# Patient Record
Sex: FEMALE | Race: WHITE
[De-identification: names, ages, dates, MRNs, and addresses within clinical notes are randomized per-mention and may not be internally consistent; named-entity substitution may affect disease eponyms.]

---

## 2020-02-18 ENCOUNTER — APPOINTMENT (OUTPATIENT)
Dept: OBGYN | Facility: CLINIC | Age: 30
End: 2020-02-18
Payer: COMMERCIAL

## 2020-02-18 PROCEDURE — 99385 PREV VISIT NEW AGE 18-39: CPT

## 2021-07-13 ENCOUNTER — APPOINTMENT (OUTPATIENT)
Dept: OBGYN | Facility: CLINIC | Age: 31
End: 2021-07-13
Payer: COMMERCIAL

## 2021-07-13 PROCEDURE — 99395 PREV VISIT EST AGE 18-39: CPT

## 2021-07-13 PROCEDURE — 99072 ADDL SUPL MATRL&STAF TM PHE: CPT

## 2023-01-29 PROBLEM — Z00.00 ENCOUNTER FOR PREVENTIVE HEALTH EXAMINATION: Status: ACTIVE | Noted: 2023-01-29

## 2023-01-30 ENCOUNTER — APPOINTMENT (OUTPATIENT)
Dept: OBGYN | Facility: CLINIC | Age: 33
End: 2023-01-30
Payer: COMMERCIAL

## 2023-01-30 ENCOUNTER — TRANSCRIPTION ENCOUNTER (OUTPATIENT)
Age: 33
End: 2023-01-30

## 2023-01-30 VITALS
HEIGHT: 61 IN | BODY MASS INDEX: 23.98 KG/M2 | WEIGHT: 127 LBS | HEART RATE: 85 BPM | OXYGEN SATURATION: 96 % | DIASTOLIC BLOOD PRESSURE: 74 MMHG | SYSTOLIC BLOOD PRESSURE: 114 MMHG

## 2023-01-30 DIAGNOSIS — Z84.81 FAMILY HISTORY OF CARRIER OF GENETIC DISEASE: ICD-10-CM

## 2023-01-30 DIAGNOSIS — Z11.51 ENCOUNTER FOR SCREENING FOR HUMAN PAPILLOMAVIRUS (HPV): ICD-10-CM

## 2023-01-30 DIAGNOSIS — Z01.419 ENCOUNTER FOR GYNECOLOGICAL EXAMINATION (GENERAL) (ROUTINE) W/OUT ABNORMAL FINDINGS: ICD-10-CM

## 2023-01-30 DIAGNOSIS — Z30.432 ENCOUNTER FOR REMOVAL OF INTRAUTERINE CONTRACEPTIVE DEVICE: ICD-10-CM

## 2023-01-30 PROCEDURE — 99395 PREV VISIT EST AGE 18-39: CPT | Mod: 25

## 2023-01-30 PROCEDURE — 58301 REMOVE INTRAUTERINE DEVICE: CPT

## 2023-01-30 NOTE — END OF VISIT
[FreeTextEntry3] : I, Cassandra Matthews, acted as a scribe on behalf of Dr. Patricia Mcdonald on 01/30/2023 .\par \par All medical entries made by the scribe were at my, Dr. Patricia Mcdonald, direction and personally dictated by me on 01/30/2023. I have reviewed the chart and agree that the record accurately reflects my personal performance of the history, physical exam, assessment and plan. I have also personally directed, reviewed, and agreed with the chart.

## 2023-01-30 NOTE — PROCEDURE
[Cervical Pap Smear] : cervical Pap smear [Liquid Base] : liquid base [Tolerated Well] : the patient tolerated the procedure well [IUD Removal] : intrauterine device (IUD) removal [IUD Removed - Forceps] : IUD removed - forceps [IUD Discarded] : IUD discarded [Fertility Desired] : fertility desired [Risks] : risks [Benefits] : benefits [Alternatives] : alternatives [Patient] : patient [Speculum Placed] : speculum placed [No Complications] : no complications

## 2023-01-30 NOTE — PLAN
[FreeTextEntry1] : 33 y/o for annual exam\par \par - pap and hpv done today\par - advised prenatal vitamins \par - discussed ovulation tracking \par - offered BRCA testing, pt will consider and will try to encourage her mother to get testing

## 2023-01-30 NOTE — HISTORY OF PRESENT ILLNESS
[FreeTextEntry1] : 31 y/o G0 for annual exam.. Last seen 7/21. Negative PAP 2/20. Pt's maternal uncle is a BRCA carrier. Pt's mother never tested for BRCA gene. Pt would like to remove IUD and would like to conceive. Pt is doing well and has no complaints. \par \par She works in finance

## 2023-01-31 LAB — HPV HIGH+LOW RISK DNA PNL CVX: NOT DETECTED

## 2023-02-02 ENCOUNTER — TRANSCRIPTION ENCOUNTER (OUTPATIENT)
Age: 33
End: 2023-02-02

## 2023-02-02 LAB — CYTOLOGY CVX/VAG DOC THIN PREP: NORMAL

## 2023-06-26 ENCOUNTER — NON-APPOINTMENT (OUTPATIENT)
Age: 33
End: 2023-06-26

## 2023-06-26 ENCOUNTER — APPOINTMENT (OUTPATIENT)
Dept: OBGYN | Facility: CLINIC | Age: 33
End: 2023-06-26
Payer: COMMERCIAL

## 2023-06-26 VITALS
HEIGHT: 61 IN | BODY MASS INDEX: 24.55 KG/M2 | SYSTOLIC BLOOD PRESSURE: 122 MMHG | HEART RATE: 115 BPM | WEIGHT: 130 LBS | OXYGEN SATURATION: 100 % | DIASTOLIC BLOOD PRESSURE: 85 MMHG

## 2023-06-26 DIAGNOSIS — N39.0 URINARY TRACT INFECTION, SITE NOT SPECIFIED: ICD-10-CM

## 2023-06-26 DIAGNOSIS — E55.9 VITAMIN D DEFICIENCY, UNSPECIFIED: ICD-10-CM

## 2023-06-26 DIAGNOSIS — R73.9 HYPERGLYCEMIA, UNSPECIFIED: ICD-10-CM

## 2023-06-26 DIAGNOSIS — Z01.83 ENCOUNTER FOR BLOOD TYPING: ICD-10-CM

## 2023-06-26 DIAGNOSIS — Z97.5 PRESENCE OF (INTRAUTERINE) CONTRACEPTIVE DEVICE: ICD-10-CM

## 2023-06-26 DIAGNOSIS — Z11.59 ENCOUNTER FOR SCREENING FOR OTHER VIRAL DISEASES: ICD-10-CM

## 2023-06-26 DIAGNOSIS — Z01.84 ENCOUNTER FOR ANTIBODY RESPONSE EXAMINATION: ICD-10-CM

## 2023-06-26 DIAGNOSIS — Z11.4 ENCOUNTER FOR SCREENING FOR HUMAN IMMUNODEFICIENCY VIRUS [HIV]: ICD-10-CM

## 2023-06-26 DIAGNOSIS — D64.9 ANEMIA, UNSPECIFIED: ICD-10-CM

## 2023-06-26 DIAGNOSIS — Z32.01 ENCOUNTER FOR PREGNANCY TEST, RESULT POSITIVE: ICD-10-CM

## 2023-06-26 DIAGNOSIS — Z11.8 ENCOUNTER FOR SCREENING FOR OTHER INFECTIOUS AND PARASITIC DISEASES: ICD-10-CM

## 2023-06-26 DIAGNOSIS — Z13.88 ENCOUNTER FOR SCREENING FOR DISORDER DUE TO EXPOSURE TO CONTAMINANTS: ICD-10-CM

## 2023-06-26 DIAGNOSIS — R53.83 OTHER FATIGUE: ICD-10-CM

## 2023-06-26 DIAGNOSIS — Z11.3 ENCOUNTER FOR SCREENING FOR INFECTIONS WITH A PREDOMINANTLY SEXUAL MODE OF TRANSMISSION: ICD-10-CM

## 2023-06-26 DIAGNOSIS — N64.4 MASTODYNIA: ICD-10-CM

## 2023-06-26 DIAGNOSIS — Z13.21 ENCOUNTER FOR SCREENING FOR NUTRITIONAL DISORDER: ICD-10-CM

## 2023-06-26 PROCEDURE — 76817 TRANSVAGINAL US OBSTETRIC: CPT

## 2023-06-26 PROCEDURE — 36415 COLL VENOUS BLD VENIPUNCTURE: CPT

## 2023-06-26 PROCEDURE — 99213 OFFICE O/P EST LOW 20 MIN: CPT | Mod: 25

## 2023-06-26 NOTE — HISTORY OF PRESENT ILLNESS
[FreeTextEntry1] : 33 y/o  presenting for confirmation of pregnancy after positive home pregnancy test. LMP May 4th. Pt had her IUD removed in 2023 and had a neg. Pap and HPV at that visit. Pt reports 24 day cycles. she complains of breast tenderness and fatigue.

## 2023-06-26 NOTE — PROCEDURE
[Transvaginal OB Sonogram] : Transvaginal OB Sonogram [Intrauterine Pregnancy] : intrauterine pregnancy [Yolk Sac] : yolk sac present [Fetal Heart] : fetal heart present [Transvaginal OB Sonogram WNL] : Transvaginal OB Sonogram WNL [Cervical Pap Smear] : cervical Pap smear [Liquid Base] : liquid base [Tolerated Well] : the patient tolerated the procedure well [No Complications] : there were no complications [CRL: ___ (mm)] : CRL - [unfilled]Umm [Current GA by Sonogram: ___ (wks)] : Current GA by Sonogram: [unfilled]Uwks [___ day(s)] : [unfilled] days

## 2023-06-26 NOTE — PLAN
[FreeTextEntry1] : 31 y/o  @ 7+4/7 weeks by LMP, 7 weeks by CRL\par \par -new OB panel\par -Pap UTD\par -F/UP 4-5 Weeks for NT sono and NIPS

## 2023-06-27 LAB
25(OH)D3 SERPL-MCNC: 27.8 NG/ML
C TRACH RRNA SPEC QL NAA+PROBE: NOT DETECTED
ESTIMATED AVERAGE GLUCOSE: 103 MG/DL
HBA1C MFR BLD HPLC: 5.2 %
HBV SURFACE AG SER QL: NONREACTIVE
HGB A MFR BLD: 97.3 %
HGB A2 MFR BLD: 2.7 %
HGB FRACT BLD-IMP: NORMAL
HIV1+2 AB SPEC QL IA.RAPID: NONREACTIVE
N GONORRHOEA RRNA SPEC QL NAA+PROBE: NOT DETECTED
SOURCE AMPLIFICATION: NORMAL
T PALLIDUM AB SER QL IA: NEGATIVE
TSH SERPL-ACNC: 4.59 UIU/ML

## 2023-06-28 ENCOUNTER — TRANSCRIPTION ENCOUNTER (OUTPATIENT)
Age: 33
End: 2023-06-28

## 2023-06-28 ENCOUNTER — LABORATORY RESULT (OUTPATIENT)
Age: 33
End: 2023-06-28

## 2023-06-28 DIAGNOSIS — Z13.29 ENCOUNTER FOR SCREENING FOR OTHER SUSPECTED ENDOCRINE DISORDER: ICD-10-CM

## 2023-06-28 LAB
ABO + RH PNL BLD: NORMAL
BACTERIA UR CULT: NORMAL
BLD GP AB SCN SERPL QL: NORMAL
LEAD BLD-MCNC: 1.2 UG/DL
MEV IGG FLD QL IA: 28.9 AU/ML
MEV IGG+IGM SER-IMP: POSITIVE
MUV AB SER-ACNC: POSITIVE
MUV IGG SER QL IA: 56 AU/ML
RUBV IGG FLD-ACNC: 3.8 INDEX
RUBV IGG SER-IMP: POSITIVE
VZV AB TITR SER: POSITIVE
VZV IGG SER IF-ACNC: 272.3 INDEX

## 2023-07-01 LAB
B19V IGG SER QL IA: 3.55 INDEX
B19V IGG+IGM SER-IMP: NORMAL
B19V IGG+IGM SER-IMP: POSITIVE
B19V IGM FLD-ACNC: 0.17 INDEX
B19V IGM SER-ACNC: NEGATIVE
TSH SERPL-ACNC: 4.23 UIU/ML

## 2023-07-03 ENCOUNTER — NON-APPOINTMENT (OUTPATIENT)
Age: 33
End: 2023-07-03

## 2023-07-03 DIAGNOSIS — R79.89 OTHER SPECIFIED ABNORMAL FINDINGS OF BLOOD CHEMISTRY: ICD-10-CM

## 2023-07-10 ENCOUNTER — NON-APPOINTMENT (OUTPATIENT)
Age: 33
End: 2023-07-10

## 2023-07-10 ENCOUNTER — TRANSCRIPTION ENCOUNTER (OUTPATIENT)
Age: 33
End: 2023-07-10

## 2023-07-11 ENCOUNTER — TRANSCRIPTION ENCOUNTER (OUTPATIENT)
Age: 33
End: 2023-07-11

## 2023-07-28 ENCOUNTER — APPOINTMENT (OUTPATIENT)
Dept: OBGYN | Facility: CLINIC | Age: 33
End: 2023-07-28
Payer: COMMERCIAL

## 2023-07-28 ENCOUNTER — ASOB RESULT (OUTPATIENT)
Age: 33
End: 2023-07-28

## 2023-07-28 DIAGNOSIS — Z34.01 ENCOUNTER FOR SUPERVISION OF NORMAL FIRST PREGNANCY, FIRST TRIMESTER: ICD-10-CM

## 2023-07-28 PROCEDURE — 0500F INITIAL PRENATAL CARE VISIT: CPT

## 2023-07-28 PROCEDURE — 36415 COLL VENOUS BLD VENIPUNCTURE: CPT

## 2023-07-28 PROCEDURE — 76801 OB US < 14 WKS SINGLE FETUS: CPT

## 2023-07-28 RX ORDER — LEVOTHYROXINE SODIUM 0.05 MG/1
50 TABLET ORAL DAILY
Qty: 90 | Refills: 1 | Status: ACTIVE | COMMUNITY
Start: 2023-07-03 | End: 1900-01-01

## 2023-07-31 ENCOUNTER — TRANSCRIPTION ENCOUNTER (OUTPATIENT)
Age: 33
End: 2023-07-31

## 2023-07-31 ENCOUNTER — NON-APPOINTMENT (OUTPATIENT)
Age: 33
End: 2023-07-31

## 2023-07-31 LAB — TSH SERPL-ACNC: 2.33 UIU/ML

## 2023-08-10 ENCOUNTER — APPOINTMENT (OUTPATIENT)
Dept: ANTEPARTUM | Facility: CLINIC | Age: 33
End: 2023-08-10
Payer: COMMERCIAL

## 2023-08-10 ENCOUNTER — ASOB RESULT (OUTPATIENT)
Age: 33
End: 2023-08-10

## 2023-08-10 ENCOUNTER — NON-APPOINTMENT (OUTPATIENT)
Age: 33
End: 2023-08-10

## 2023-08-10 PROCEDURE — 36415 COLL VENOUS BLD VENIPUNCTURE: CPT

## 2023-08-10 PROCEDURE — 76813 OB US NUCHAL MEAS 1 GEST: CPT

## 2023-08-10 PROCEDURE — 93976 VASCULAR STUDY: CPT

## 2023-08-16 ENCOUNTER — TRANSCRIPTION ENCOUNTER (OUTPATIENT)
Age: 33
End: 2023-08-16

## 2023-08-23 ENCOUNTER — NON-APPOINTMENT (OUTPATIENT)
Age: 33
End: 2023-08-23

## 2023-08-23 ENCOUNTER — TRANSCRIPTION ENCOUNTER (OUTPATIENT)
Age: 33
End: 2023-08-23

## 2023-08-23 ENCOUNTER — APPOINTMENT (OUTPATIENT)
Dept: OBGYN | Facility: CLINIC | Age: 33
End: 2023-08-23

## 2023-08-24 ENCOUNTER — TRANSCRIPTION ENCOUNTER (OUTPATIENT)
Age: 33
End: 2023-08-24

## 2023-08-25 ENCOUNTER — TRANSCRIPTION ENCOUNTER (OUTPATIENT)
Age: 33
End: 2023-08-25

## 2023-08-28 ENCOUNTER — NON-APPOINTMENT (OUTPATIENT)
Age: 33
End: 2023-08-28

## 2023-08-28 ENCOUNTER — APPOINTMENT (OUTPATIENT)
Dept: OBGYN | Facility: CLINIC | Age: 33
End: 2023-08-28
Payer: COMMERCIAL

## 2023-08-28 PROCEDURE — 0502F SUBSEQUENT PRENATAL CARE: CPT

## 2023-08-29 ENCOUNTER — NON-APPOINTMENT (OUTPATIENT)
Age: 33
End: 2023-08-29

## 2023-08-29 ENCOUNTER — ASOB RESULT (OUTPATIENT)
Age: 33
End: 2023-08-29

## 2023-08-30 ENCOUNTER — INPATIENT (INPATIENT)
Facility: HOSPITAL | Age: 33
LOS: 0 days | Discharge: HOME | DRG: 833 | End: 2023-08-31
Attending: STUDENT IN AN ORGANIZED HEALTH CARE EDUCATION/TRAINING PROGRAM | Admitting: STUDENT IN AN ORGANIZED HEALTH CARE EDUCATION/TRAINING PROGRAM
Payer: COMMERCIAL

## 2023-08-30 ENCOUNTER — LABORATORY RESULT (OUTPATIENT)
Age: 33
End: 2023-08-30

## 2023-08-30 ENCOUNTER — APPOINTMENT (OUTPATIENT)
Dept: MATERNAL FETAL MEDICINE | Facility: CLINIC | Age: 33
End: 2023-08-30
Payer: COMMERCIAL

## 2023-08-30 ENCOUNTER — APPOINTMENT (OUTPATIENT)
Dept: ANTEPARTUM | Facility: CLINIC | Age: 33
End: 2023-08-30
Payer: COMMERCIAL

## 2023-08-30 ENCOUNTER — ASOB RESULT (OUTPATIENT)
Age: 33
End: 2023-08-30

## 2023-08-30 VITALS — SYSTOLIC BLOOD PRESSURE: 121 MMHG | HEART RATE: 81 BPM | DIASTOLIC BLOOD PRESSURE: 92 MMHG | OXYGEN SATURATION: 99 %

## 2023-08-30 DIAGNOSIS — O26.899 OTHER SPECIFIED PREGNANCY RELATED CONDITIONS, UNSPECIFIED TRIMESTER: ICD-10-CM

## 2023-08-30 PROCEDURE — 76805 OB US >/= 14 WKS SNGL FETUS: CPT

## 2023-08-30 PROCEDURE — 59000 AMNIOCENTESIS DIAGNOSTIC: CPT

## 2023-08-30 PROCEDURE — 99203 OFFICE O/P NEW LOW 30 MIN: CPT | Mod: 1L,95

## 2023-08-30 PROCEDURE — 76946 ECHO GUIDE FOR AMNIOCENTESIS: CPT

## 2023-08-30 NOTE — OB PROVIDER TRIAGE NOTE - NSOBPROVIDERNOTE_OBGYN_ALL_OB_FT
32 year old  @ 15+6 presents for r/o PPROM complaining of an acute leakage of fluid at 7:15p.    -nitrazine negative, ferning negative, pooling negative      to be discussed w Dr Silas Bob PGY3 32 year old  @ 15+6 presents for r/o PPROM complaining of an acute leakage of fluid at 7:15p s/p amniocentesis.    -quiescent on tocometer  -nitrazine negative, ferning negative, pooling negative  -given low fluid seen on ultrasound, amnisure to be sent    to be discussed w Dr Silas Bob PGY3 32 year old  @ 15+6 presents for r/o PPROM complaining of an acute leakage of fluid at 7:15p s/p amniocentesis.    -quiescent on tocometer  -nitrazine negative, ferning negative, pooling negative  -given low fluid seen on ultrasound, amnisure to be sent    discussed w Dr Silas Bob PGY3    MD called to reassess patient 2/2 increased lekage of fluid  -speculum: pooling positive  -amnisure positive    discussed w Dr Silas Bob PGY3

## 2023-08-30 NOTE — OB PROVIDER TRIAGE NOTE - HISTORY OF PRESENT ILLNESS
Pt is a 33y/o  at 15+6 currently POD#0 s/p amniocentesis presents to triage for r/o PPROM. Patient states that at 6:15p this evening patient felt warm fluid in her underwear that soaked her underwear but not her pants. Subsequently decided to come to r/o rupture. Denies Cx, VB.  Prenatal course c/b...  1. low PAPPA with low risk NIPs patient s/p Amnio (40cc) on     ATU (): variable, posterior placenta, 123g    OBHx: Primagravid  GynHx: Denies cysts, fibroids, abnormal paps, STIs  PMHx: Herniated discs  PSHx: Open appendectomy, Back Surgery x2  Med: ASA, PNV, Levothyroxine  All: NKDA  SH: denies toxic habits x3     Pt is a 31y/o  at 15+6 currently POD#0 s/p amniocentesis presents to triage for r/o PPROM. Patient states that at 6:15p this evening patient felt warm fluid in her underwear that soaked her underwear but not her pants. Subsequently decided to come to r/o rupture. Denies Cx, VB.  Prenatal course c/b...  1. low PAPPA with low risk NIPs patient s/p Amnio (40cc) on     ATU (): variable, posterior placenta, 123g    OBHx: Primagravid  GynHx: Denies cysts, fibroids, abnormal paps, STIs  PMHx: Herniated discs  PSHx: Open appendectomy, Back Surgery x2  Med: ASA, PNV, Levothyroxine  All: NKDA  SH: denies toxic habits x3

## 2023-08-30 NOTE — OB PROVIDER TRIAGE NOTE - NSHPPHYSICALEXAM_GEN_ALL_CORE
VS:  Vital Signs Last 24 Hrs  T(C): 37.2 (30 Aug 2023 21:35), Max: 37.2 (30 Aug 2023 21:35)  T(F): 98.96 (30 Aug 2023 21:35), Max: 98.96 (30 Aug 2023 21:35)  HR: 74 (30 Aug 2023 22:38) (73 - 113)  BP: 121/82 (30 Aug 2023 21:28) (108/68 - 121/92)  BP(mean): --  RR: 18 (30 Aug 2023 21:28) (18 - 19)  SpO2: 94% (30 Aug 2023 22:38) (87% - 100%)    Parameters below as of 30 Aug 2023 21:28  Patient On (Oxygen Delivery Method): room air      GA: NAD  Cards: RRR  Pulm: CTAB  FHR: 166bp  [place]: not angela  VE: deferred  TAUS: variable, +FM  Speculum: Negative nitrazine, negative ferning, negative pooling VS:  Vital Signs Last 24 Hrs  T(C): 37.2 (30 Aug 2023 21:35), Max: 37.2 (30 Aug 2023 21:35)  T(F): 98.96 (30 Aug 2023 21:35), Max: 98.96 (30 Aug 2023 21:35)  HR: 74 (30 Aug 2023 22:38) (73 - 113)  BP: 121/82 (30 Aug 2023 21:28) (108/68 - 121/92)  BP(mean): --  RR: 18 (30 Aug 2023 21:28) (18 - 19)  SpO2: 94% (30 Aug 2023 22:38) (87% - 100%)    Parameters below as of 30 Aug 2023 21:28  Patient On (Oxygen Delivery Method): room air      GA: NAD  Cards: RRR  Pulm: CTAB  FHR: 166bp  [place]: not angela  VE: deferred  TAUS: variable, +FM  Speculum: Negative nitrazine, negative ferning, negative pooling VS:  Vital Signs Last 24 Hrs  T(C): 37.2 (30 Aug 2023 21:35), Max: 37.2 (30 Aug 2023 21:35)  T(F): 98.96 (30 Aug 2023 21:35), Max: 98.96 (30 Aug 2023 21:35)  HR: 74 (30 Aug 2023 22:38) (73 - 113)  BP: 121/82 (30 Aug 2023 21:28) (108/68 - 121/92)  BP(mean): --  RR: 18 (30 Aug 2023 21:28) (18 - 19)  SpO2: 94% (30 Aug 2023 22:38) (87% - 100%)    Parameters below as of 30 Aug 2023 21:28  Patient On (Oxygen Delivery Method): room air      GA: NAD  Cards: RRR  Pulm: CTAB  FHR: 166bp  [place]: not angela  VE: deferred  TAUS: variable, +FM  Speculum: Negative nitrazine, negative ferning, negative pooling

## 2023-08-30 NOTE — OB RN TRIAGE NOTE - FALL HARM RISK - UNIVERSAL INTERVENTIONS
Bed in lowest position, wheels locked, appropriate side rails in place/Call bell, personal items and telephone in reach/Instruct patient to call for assistance before getting out of bed or chair/Non-slip footwear when patient is out of bed/[place] to call system/Physically safe environment - no spills, clutter or unnecessary equipment/Purposeful Proactive Rounding/Room/bathroom lighting operational, light cord in reach Bed in lowest position, wheels locked, appropriate side rails in place/Call bell, personal items and telephone in reach/Instruct patient to call for assistance before getting out of bed or chair/Non-slip footwear when patient is out of bed/[place] to call system/Physically safe environment - no spills, clutter or unnecessary equipment/Purposeful Proactive Rounding/Room/bathroom lighting operational, light cord in reach Bed in lowest position, wheels locked, appropriate side rails in place/Call bell, personal items and telephone in reach/Instruct patient to call for assistance before getting out of bed or chair/Non-slip footwear when patient is out of bed/[place] to call system/Physically safe environment - no spills, clutter or unnecessary equipment/Purposeful Proactive Rounding/Room/bathroom lighting operational, light cord in reach

## 2023-08-31 ENCOUNTER — TRANSCRIPTION ENCOUNTER (OUTPATIENT)
Age: 33
End: 2023-08-31

## 2023-08-31 ENCOUNTER — APPOINTMENT (OUTPATIENT)
Dept: ANTEPARTUM | Facility: CLINIC | Age: 33
End: 2023-08-31
Payer: COMMERCIAL

## 2023-08-31 ENCOUNTER — NON-APPOINTMENT (OUTPATIENT)
Age: 33
End: 2023-08-31

## 2023-08-31 ENCOUNTER — ASOB RESULT (OUTPATIENT)
Age: 33
End: 2023-08-31

## 2023-08-31 VITALS
RESPIRATION RATE: 18 BRPM | TEMPERATURE: 99 F | SYSTOLIC BLOOD PRESSURE: 99 MMHG | DIASTOLIC BLOOD PRESSURE: 65 MMHG | OXYGEN SATURATION: 97 % | HEART RATE: 72 BPM

## 2023-08-31 DIAGNOSIS — Z34.80 ENCOUNTER FOR SUPERVISION OF OTHER NORMAL PREGNANCY, UNSPECIFIED TRIMESTER: ICD-10-CM

## 2023-08-31 LAB
ALBUMIN SERPL ELPH-MCNC: 4.1 G/DL — SIGNIFICANT CHANGE UP (ref 3.3–5)
ALP SERPL-CCNC: 92 U/L — SIGNIFICANT CHANGE UP (ref 40–120)
ALT FLD-CCNC: 95 U/L — HIGH (ref 10–45)
AMNISURE ROM (RUPTURE OF MEMBRANES): POSITIVE
ANION GAP SERPL CALC-SCNC: 17 MMOL/L — SIGNIFICANT CHANGE UP (ref 5–17)
APTT BLD: 26.3 SEC — SIGNIFICANT CHANGE UP (ref 24.5–35.6)
AST SERPL-CCNC: 47 U/L — HIGH (ref 10–40)
BILIRUB SERPL-MCNC: 0.2 MG/DL — SIGNIFICANT CHANGE UP (ref 0.2–1.2)
BLD GP AB SCN SERPL QL: NEGATIVE — SIGNIFICANT CHANGE UP
BUN SERPL-MCNC: 6 MG/DL — LOW (ref 7–23)
CALCIUM SERPL-MCNC: 9.3 MG/DL — SIGNIFICANT CHANGE UP (ref 8.4–10.5)
CHLORIDE SERPL-SCNC: 101 MMOL/L — SIGNIFICANT CHANGE UP (ref 96–108)
CO2 SERPL-SCNC: 18 MMOL/L — LOW (ref 22–31)
CREAT SERPL-MCNC: 0.55 MG/DL — SIGNIFICANT CHANGE UP (ref 0.5–1.3)
EGFR: 126 ML/MIN/1.73M2 — SIGNIFICANT CHANGE UP
FIBRINOGEN PPP-MCNC: 557 MG/DL — HIGH (ref 200–445)
GLUCOSE SERPL-MCNC: 90 MG/DL — SIGNIFICANT CHANGE UP (ref 70–99)
HCT VFR BLD CALC: 34.8 % — SIGNIFICANT CHANGE UP (ref 34.5–45)
HCT VFR BLD CALC: 36.9 % — SIGNIFICANT CHANGE UP (ref 34.5–45)
HGB BLD-MCNC: 12.2 G/DL — SIGNIFICANT CHANGE UP (ref 11.5–15.5)
HGB BLD-MCNC: 12.7 G/DL — SIGNIFICANT CHANGE UP (ref 11.5–15.5)
INR BLD: 0.93 RATIO — SIGNIFICANT CHANGE UP (ref 0.85–1.18)
MCHC RBC-ENTMCNC: 31.1 PG — SIGNIFICANT CHANGE UP (ref 27–34)
MCHC RBC-ENTMCNC: 31.5 PG — SIGNIFICANT CHANGE UP (ref 27–34)
MCHC RBC-ENTMCNC: 34.4 GM/DL — SIGNIFICANT CHANGE UP (ref 32–36)
MCHC RBC-ENTMCNC: 35.1 GM/DL — SIGNIFICANT CHANGE UP (ref 32–36)
MCV RBC AUTO: 89.9 FL — SIGNIFICANT CHANGE UP (ref 80–100)
MCV RBC AUTO: 90.2 FL — SIGNIFICANT CHANGE UP (ref 80–100)
NRBC # BLD: 0 /100 WBCS — SIGNIFICANT CHANGE UP (ref 0–0)
PLATELET # BLD AUTO: 227 K/UL — SIGNIFICANT CHANGE UP (ref 150–400)
PLATELET # BLD AUTO: 235 K/UL — SIGNIFICANT CHANGE UP (ref 150–400)
POTASSIUM SERPL-MCNC: 4.2 MMOL/L — SIGNIFICANT CHANGE UP (ref 3.5–5.3)
POTASSIUM SERPL-SCNC: 4.2 MMOL/L — SIGNIFICANT CHANGE UP (ref 3.5–5.3)
PROT SERPL-MCNC: 7.1 G/DL — SIGNIFICANT CHANGE UP (ref 6–8.3)
PROTHROM AB SERPL-ACNC: 9.8 SEC — SIGNIFICANT CHANGE UP (ref 9.5–13)
RBC # BLD: 3.87 M/UL — SIGNIFICANT CHANGE UP (ref 3.8–5.2)
RBC # BLD: 4.09 M/UL — SIGNIFICANT CHANGE UP (ref 3.8–5.2)
RBC # FLD: 11.4 % — SIGNIFICANT CHANGE UP (ref 10.3–14.5)
RBC # FLD: 11.5 % — SIGNIFICANT CHANGE UP (ref 10.3–14.5)
RH IG SCN BLD-IMP: POSITIVE — SIGNIFICANT CHANGE UP
SODIUM SERPL-SCNC: 136 MMOL/L — SIGNIFICANT CHANGE UP (ref 135–145)
WBC # BLD: 8.51 K/UL — SIGNIFICANT CHANGE UP (ref 3.8–10.5)
WBC # BLD: 9.34 K/UL — SIGNIFICANT CHANGE UP (ref 3.8–10.5)
WBC # FLD AUTO: 8.51 K/UL — SIGNIFICANT CHANGE UP (ref 3.8–10.5)
WBC # FLD AUTO: 9.34 K/UL — SIGNIFICANT CHANGE UP (ref 3.8–10.5)

## 2023-08-31 PROCEDURE — 76815 OB US LIMITED FETUS(S): CPT | Mod: 26

## 2023-08-31 PROCEDURE — 86900 BLOOD TYPING SEROLOGIC ABO: CPT

## 2023-08-31 PROCEDURE — 84112 EVAL AMNIOTIC FLUID PROTEIN: CPT

## 2023-08-31 PROCEDURE — 36415 COLL VENOUS BLD VENIPUNCTURE: CPT

## 2023-08-31 PROCEDURE — 85730 THROMBOPLASTIN TIME PARTIAL: CPT

## 2023-08-31 PROCEDURE — 99238 HOSP IP/OBS DSCHRG MGMT 30/<: CPT

## 2023-08-31 PROCEDURE — 85384 FIBRINOGEN ACTIVITY: CPT

## 2023-08-31 PROCEDURE — 86901 BLOOD TYPING SEROLOGIC RH(D): CPT

## 2023-08-31 PROCEDURE — 99222 1ST HOSP IP/OBS MODERATE 55: CPT | Mod: 25

## 2023-08-31 PROCEDURE — 85027 COMPLETE CBC AUTOMATED: CPT

## 2023-08-31 PROCEDURE — 86850 RBC ANTIBODY SCREEN: CPT

## 2023-08-31 PROCEDURE — 85610 PROTHROMBIN TIME: CPT

## 2023-08-31 PROCEDURE — 80053 COMPREHEN METABOLIC PANEL: CPT

## 2023-08-31 RX ORDER — ACETAMINOPHEN 500 MG
975 TABLET ORAL ONCE
Refills: 0 | Status: COMPLETED | OUTPATIENT
Start: 2023-08-31 | End: 2023-08-31

## 2023-08-31 RX ORDER — LEVOTHYROXINE SODIUM 125 MCG
50 TABLET ORAL DAILY
Refills: 0 | Status: DISCONTINUED | OUTPATIENT
Start: 2023-08-31 | End: 2023-08-31

## 2023-08-31 RX ORDER — SODIUM CHLORIDE 9 MG/ML
1000 INJECTION, SOLUTION INTRAVENOUS
Refills: 0 | Status: DISCONTINUED | OUTPATIENT
Start: 2023-08-31 | End: 2023-08-31

## 2023-08-31 RX ORDER — LEVOTHYROXINE SODIUM 125 MCG
1 TABLET ORAL
Qty: 0 | Refills: 0 | DISCHARGE
Start: 2023-08-31

## 2023-08-31 RX ADMIN — SODIUM CHLORIDE 125 MILLILITER(S): 9 INJECTION, SOLUTION INTRAVENOUS at 09:21

## 2023-08-31 RX ADMIN — Medication 975 MILLIGRAM(S): at 09:31

## 2023-08-31 RX ADMIN — Medication 975 MILLIGRAM(S): at 10:31

## 2023-08-31 RX ADMIN — Medication 50 MICROGRAM(S): at 06:02

## 2023-08-31 NOTE — CONSULT NOTE ADULT - SUBJECTIVE AND OBJECTIVE BOX
MFM Consult Note  SHANTANU WU  32y  Female [id number]    HPI:  Pt is a 31y/o  now at 16+0, POD#1 s/p amniocentesis who was admitted for previable PPROM (+pooling, + Amnisure) Patient reports leakage of fluid beginning at 615pm following amniocentesis, she felt warm fluid in her underwear that soaked her underwear but not her pants. She denies having contractions, abdominal pain, or vaginal bleeding. Also denies fevers, chills, nausea, vomiting.       Name of OB Physician: Dr. Patricia Mcdonald    Prenatal course c/b...  1. low PAPPA with low risk NIPs patient s/p Amnio (40cc) on  due to concerns for trisomy 21    ATU (): variable, posterior placenta, 123g    OBHx: Primigravid  GynHx: Denies cysts, fibroids, abnormal paps, STIs  PMHx: Herniated discs  PSHx: Open appendectomy, Back Surgery x2  Med: ASA, PNV, Levothyroxine 50mcg  All: NKDA  SH: denies x3          Physical Exam:   ICU Vital Signs Last 24 Hrs  T(C): 36.7 (31 Aug 2023 06:05), Max: 37.2 (30 Aug 2023 21:35)  T(F): 98 (31 Aug 2023 06:05), Max: 99 (31 Aug 2023 01:44)  HR: 93 (31 Aug 2023 06:05) (71 - 128)  BP: 116/71 (31 Aug 2023 06:05) (108/68 - 121/92)  BP(mean): --  ABP: --  ABP(mean): --  RR: 18 (31 Aug 2023 06:05) (17 - 19)  SpO2: 97% (31 Aug 2023 06:05) (74% - 100%)    O2 Parameters below as of 31 Aug 2023 06:05  Patient On (Oxygen Delivery Method): room air          General: sitting comfortably in bed, NAD, tearful  SVE: deferred     LABS:                              12.2   8.51  )-----------( 227      ( 31 Aug 2023 06:36 )             34.8     08-    136  |  101  |  6<L>  ----------------------------<  90  4.2   |  18<L>  |  0.55    Ca    9.3      31 Aug 2023 01:56    TPro  7.1  /  Alb  4.1  /  TBili  0.2  /  DBili  x   /  AST  47<H>  /  ALT  95<H>  /  AlkPhos  92      I&O's Detail    30 Aug 2023 07:01  -  31 Aug 2023 07:00  --------------------------------------------------------  IN:    Lactated Ringers: 375 mL  Total IN: 375 mL    OUT:  Total OUT: 0 mL    Total NET: 375 mL        PT/INR - ( 31 Aug 2023 06:36 )   PT: 9.8 sec;   INR: 0.93 ratio         PTT - ( 31 Aug 2023 06:36 )  PTT:26.3 sec  Urinalysis Basic - ( 31 Aug 2023 01:56 )    Color: x / Appearance: x / SG: x / pH: x  Gluc: 90 mg/dL / Ketone: x  / Bili: x / Urobili: x   Blood: x / Protein: x / Nitrite: x   Leuk Esterase: x / RBC: x / WBC x   Sq Epi: x / Non Sq Epi: x / Bacteria: x     MFM Consult Note  SHANTANU WU  32y  Female [id number]    HPI:  Pt is a 31y/o  now at 16+0, POD#1 s/p amniocentesis who was admitted for previable PPROM (+pooling, + Amnisure) Patient reports leakage of fluid beginning at 615pm following amniocentesis, she felt warm fluid in her underwear that soaked her underwear but not her pants. She denies having contractions, abdominal pain, or vaginal bleeding. Also denies fevers, chills, nausea, vomiting.       Name of OB Physician: Dr. Patricia Mcdonald    Prenatal course c/b...  1. low PAPPA with low risk NIPs patient s/p Amnio (40cc) on  due to concerns for trisomy 21    ATU (): variable, posterior placenta, 123g    OBHx: Primigravid  GynHx: Denies cysts, fibroids, abnormal paps, STIs  PMHx: Herniated discs  PSHx: Open appendectomy, Back Surgery x2  Med: ASA, PNV, Levothyroxine 50mcg  All: NKDA  SH: denies x3          Physical Exam:   ICU Vital Signs Last 24 Hrs  T(C): 36.7 (31 Aug 2023 06:05), Max: 37.2 (30 Aug 2023 21:35)  T(F): 98 (31 Aug 2023 06:05), Max: 99 (31 Aug 2023 01:44)  HR: 93 (31 Aug 2023 06:05) (71 - 128)  BP: 116/71 (31 Aug 2023 06:05) (108/68 - 121/92)  BP(mean): --  ABP: --  ABP(mean): --  RR: 18 (31 Aug 2023 06:05) (17 - 19)  SpO2: 97% (31 Aug 2023 06:05) (74% - 100%)    O2 Parameters below as of 31 Aug 2023 06:05  Patient On (Oxygen Delivery Method): room air          General: sitting comfortably in bed, NAD, tearful  SVE: deferred     LABS:                              12.2   8.51  )-----------( 227      ( 31 Aug 2023 06:36 )             34.8     08-    136  |  101  |  6<L>  ----------------------------<  90  4.2   |  18<L>  |  0.55    Ca    9.3      31 Aug 2023 01:56    TPro  7.1  /  Alb  4.1  /  TBili  0.2  /  DBili  x   /  AST  47<H>  /  ALT  95<H>  /  AlkPhos  92      I&O's Detail    30 Aug 2023 07:01  -  31 Aug 2023 07:00  --------------------------------------------------------  IN:    Lactated Ringers: 375 mL  Total IN: 375 mL    OUT:  Total OUT: 0 mL    Total NET: 375 mL        PT/INR - ( 31 Aug 2023 06:36 )   PT: 9.8 sec;   INR: 0.93 ratio         PTT - ( 31 Aug 2023 06:36 )  PTT:26.3 sec  Urinalysis Basic - ( 31 Aug 2023 01:56 )    Color: x / Appearance: x / SG: x / pH: x  Gluc: 90 mg/dL / Ketone: x  / Bili: x / Urobili: x   Blood: x / Protein: x / Nitrite: x   Leuk Esterase: x / RBC: x / WBC x   Sq Epi: x / Non Sq Epi: x / Bacteria: x     MFM Consult Note  SHANTANU WU  32y  Female [id number]    HPI:  Pt is a 31y/o  now at 16+0, POD#1 s/p amniocentesis who was admitted for previable PPROM (+pooling, + Amnisure) Patient reports leakage of fluid beginning at 615pm following amniocentesis, she felt warm fluid in her underwear that soaked her underwear but not her pants. She denies having contractions, abdominal pain, or vaginal bleeding. Also denies fevers, chills, nausea, vomiting.       Name of OB Physician: Dr. Patricia Mcdonald    Prenatal course c/b...  1. low PAPPA with low risk NIPs patient s/p Amnio (40cc) on  due to concerns for trisomy 21    ATU (): variable, posterior placenta, 123g    OBHx: Primigravid  GynHx: Denies cysts, fibroids, abnormal paps, STIs  PMHx: Herniated discs  PSHx: Open appendectomy, Back Surgery x2  Med: ASA, PNV, Levothyroxine 50mcg  All: NKDA  SH: denies x3          Physical Exam:   ICU Vital Signs Last 24 Hrs  T(C): 36.7 (31 Aug 2023 06:05), Max: 37.2 (30 Aug 2023 21:35)  T(F): 98 (31 Aug 2023 06:05), Max: 99 (31 Aug 2023 01:44)  HR: 93 (31 Aug 2023 06:05) (71 - 128)  BP: 116/71 (31 Aug 2023 06:05) (108/68 - 121/92)  BP(mean): --  ABP: --  ABP(mean): --  RR: 18 (31 Aug 2023 06:05) (17 - 19)  SpO2: 97% (31 Aug 2023 06:05) (74% - 100%)    O2 Parameters below as of 31 Aug 2023 06:05  Patient On (Oxygen Delivery Method): room air          General: sitting comfortably in bed, NAD, tearful  SVE: deferred     LABS:                              12.2   8.51  )-----------( 227      ( 31 Aug 2023 06:36 )             34.8     08-    136  |  101  |  6<L>  ----------------------------<  90  4.2   |  18<L>  |  0.55    Ca    9.3      31 Aug 2023 01:56    TPro  7.1  /  Alb  4.1  /  TBili  0.2  /  DBili  x   /  AST  47<H>  /  ALT  95<H>  /  AlkPhos  92      I&O's Detail    30 Aug 2023 07:01  -  31 Aug 2023 07:00  --------------------------------------------------------  IN:    Lactated Ringers: 375 mL  Total IN: 375 mL    OUT:  Total OUT: 0 mL    Total NET: 375 mL        PT/INR - ( 31 Aug 2023 06:36 )   PT: 9.8 sec;   INR: 0.93 ratio         PTT - ( 31 Aug 2023 06:36 )  PTT:26.3 sec  Urinalysis Basic - ( 31 Aug 2023 01:56 )    Color: x / Appearance: x / SG: x / pH: x  Gluc: 90 mg/dL / Ketone: x  / Bili: x / Urobili: x   Blood: x / Protein: x / Nitrite: x   Leuk Esterase: x / RBC: x / WBC x   Sq Epi: x / Non Sq Epi: x / Bacteria: x     MFM Consult Note  SHANTANU WU  32y  Female [id number]    HPI:  Pt is a 31y/o  now at 16+0, POD#1 s/p amniocentesis who was admitted for previable PPROM (+pooling, + Amnisure) Patient reports leakage of fluid beginning at 615pm following amniocentesis, she felt warm fluid in her underwear that soaked her underwear but not her pants. She denies having contractions, abdominal pain, or vaginal bleeding. Also denies fevers, chills, nausea, vomiting.       Name of OB Physician: Dr. Patricia Mcdoanld    Prenatal course c/b...  1. low PAPPA with low risk NIPs patient s/p Amnio (40cc) on  due to concerns for trisomy 21    ATU (): variable, posterior placenta, 123g    OBHx: Primigravid  GynHx: Denies cysts, fibroids, abnormal paps, STIs  PMHx: Herniated discs  PSHx: Open appendectomy, Back Surgery x2  Med: ASA, PNV, Levothyroxine 50mcg  All: NKDA  SH: denies x3      Physical Exam:   ICU Vital Signs Last 24 Hrs  T(C): 36.7 (31 Aug 2023 06:05), Max: 37.2 (30 Aug 2023 21:35)  T(F): 98 (31 Aug 2023 06:05), Max: 99 (31 Aug 2023 01:44)  HR: 93 (31 Aug 2023 06:05) (71 - 128)  BP: 116/71 (31 Aug 2023 06:05) (108/68 - 121/92)  BP(mean): --  ABP: --  ABP(mean): --  RR: 18 (31 Aug 2023 06:05) (17 - 19)  SpO2: 97% (31 Aug 2023 06:05) (74% - 100%)    O2 Parameters below as of 31 Aug 2023 06:05  Patient On (Oxygen Delivery Method): room air          General: sitting comfortably in bed, NAD, tearful  SVE: deferred     LABS:                              12.2   8.51  )-----------( 227      ( 31 Aug 2023 06:36 )             34.8     08-    136  |  101  |  6<L>  ----------------------------<  90  4.2   |  18<L>  |  0.55    Ca    9.3      31 Aug 2023 01:56    TPro  7.1  /  Alb  4.1  /  TBili  0.2  /  DBili  x   /  AST  47<H>  /  ALT  95<H>  /  AlkPhos  92      I&O's Detail    30 Aug 2023 07:01  -  31 Aug 2023 07:00  --------------------------------------------------------  IN:    Lactated Ringers: 375 mL  Total IN: 375 mL    OUT:  Total OUT: 0 mL    Total NET: 375 mL        PT/INR - ( 31 Aug 2023 06:36 )   PT: 9.8 sec;   INR: 0.93 ratio         PTT - ( 31 Aug 2023 06:36 )  PTT:26.3 sec  Urinalysis Basic - ( 31 Aug 2023 01:56 )    Color: x / Appearance: x / SG: x / pH: x  Gluc: 90 mg/dL / Ketone: x  / Bili: x / Urobili: x   Blood: x / Protein: x / Nitrite: x   Leuk Esterase: x / RBC: x / WBC x   Sq Epi: x / Non Sq Epi: x / Bacteria: x     MFM Consult Note  SHANTANU WU  32y  Female [id number]    HPI:  Pt is a 31y/o  now at 16+0, POD#1 s/p amniocentesis who was admitted for previable PPROM (+pooling, + Amnisure) Patient reports leakage of fluid beginning at 615pm following amniocentesis, she felt warm fluid in her underwear that soaked her underwear but not her pants. She denies having contractions, abdominal pain, or vaginal bleeding. Also denies fevers, chills, nausea, vomiting.       Name of OB Physician: Dr. Patricia Mcdonald    Prenatal course c/b...  1. low PAPPA with low risk NIPs patient s/p Amnio (40cc) on  due to concerns for trisomy 21    ATU (): variable, posterior placenta, 123g    OBHx: Primigravid  GynHx: Denies cysts, fibroids, abnormal paps, STIs  PMHx: Herniated discs  PSHx: Open appendectomy, Back Surgery x2  Med: ASA, PNV, Levothyroxine 50mcg  All: NKDA  SH: denies x3      Physical Exam:   ICU Vital Signs Last 24 Hrs  T(C): 36.7 (31 Aug 2023 06:05), Max: 37.2 (30 Aug 2023 21:35)  T(F): 98 (31 Aug 2023 06:05), Max: 99 (31 Aug 2023 01:44)  HR: 93 (31 Aug 2023 06:05) (71 - 128)  BP: 116/71 (31 Aug 2023 06:05) (108/68 - 121/92)  BP(mean): --  ABP: --  ABP(mean): --  RR: 18 (31 Aug 2023 06:05) (17 - 19)  SpO2: 97% (31 Aug 2023 06:05) (74% - 100%)    O2 Parameters below as of 31 Aug 2023 06:05  Patient On (Oxygen Delivery Method): room air          General: sitting comfortably in bed, NAD, tearful  SVE: deferred     LABS:                              12.2   8.51  )-----------( 227      ( 31 Aug 2023 06:36 )             34.8     08-    136  |  101  |  6<L>  ----------------------------<  90  4.2   |  18<L>  |  0.55    Ca    9.3      31 Aug 2023 01:56    TPro  7.1  /  Alb  4.1  /  TBili  0.2  /  DBili  x   /  AST  47<H>  /  ALT  95<H>  /  AlkPhos  92      I&O's Detail    30 Aug 2023 07:01  -  31 Aug 2023 07:00  --------------------------------------------------------  IN:    Lactated Ringers: 375 mL  Total IN: 375 mL    OUT:  Total OUT: 0 mL    Total NET: 375 mL        PT/INR - ( 31 Aug 2023 06:36 )   PT: 9.8 sec;   INR: 0.93 ratio         PTT - ( 31 Aug 2023 06:36 )  PTT:26.3 sec  Urinalysis Basic - ( 31 Aug 2023 01:56 )    Color: x / Appearance: x / SG: x / pH: x  Gluc: 90 mg/dL / Ketone: x  / Bili: x / Urobili: x   Blood: x / Protein: x / Nitrite: x   Leuk Esterase: x / RBC: x / WBC x   Sq Epi: x / Non Sq Epi: x / Bacteria: x     MFM Consult Note  SHANTANU WU  32y  Female [id number]    HPI:  Pt is a 31y/o  now at 16+0, POD#1 s/p amniocentesis who was admitted for previable PPROM (+pooling, + Amnisure) Patient reports leakage of fluid beginning at 615pm following amniocentesis, she felt warm fluid in her underwear that soaked her underwear but not her pants. She denies having contractions, abdominal pain, or vaginal bleeding. Also denies fevers, chills, nausea, vomiting.       Name of OB Physician: Dr. Patricia Mcdonald    Prenatal course c/b...  1. low PAPPA with low risk NIPs patient s/p Amnio (40cc) on  due to concerns for trisomy 21    ATU (): variable, posterior placenta, 123g    OBHx: Primigravid  GynHx: Denies cysts, fibroids, abnormal paps, STIs  PMHx: Herniated discs  PSHx: Open appendectomy, Back Surgery x2  Med: ASA, PNV, Levothyroxine 50mcg  All: NKDA  SH: denies x3      Physical Exam:   ICU Vital Signs Last 24 Hrs  T(C): 36.7 (31 Aug 2023 06:05), Max: 37.2 (30 Aug 2023 21:35)  T(F): 98 (31 Aug 2023 06:05), Max: 99 (31 Aug 2023 01:44)  HR: 93 (31 Aug 2023 06:05) (71 - 128)  BP: 116/71 (31 Aug 2023 06:05) (108/68 - 121/92)  BP(mean): --  ABP: --  ABP(mean): --  RR: 18 (31 Aug 2023 06:05) (17 - 19)  SpO2: 97% (31 Aug 2023 06:05) (74% - 100%)    O2 Parameters below as of 31 Aug 2023 06:05  Patient On (Oxygen Delivery Method): room air          General: sitting comfortably in bed, NAD, tearful  SVE: deferred     LABS:                              12.2   8.51  )-----------( 227      ( 31 Aug 2023 06:36 )             34.8     08-    136  |  101  |  6<L>  ----------------------------<  90  4.2   |  18<L>  |  0.55    Ca    9.3      31 Aug 2023 01:56    TPro  7.1  /  Alb  4.1  /  TBili  0.2  /  DBili  x   /  AST  47<H>  /  ALT  95<H>  /  AlkPhos  92      I&O's Detail    30 Aug 2023 07:01  -  31 Aug 2023 07:00  --------------------------------------------------------  IN:    Lactated Ringers: 375 mL  Total IN: 375 mL    OUT:  Total OUT: 0 mL    Total NET: 375 mL        PT/INR - ( 31 Aug 2023 06:36 )   PT: 9.8 sec;   INR: 0.93 ratio         PTT - ( 31 Aug 2023 06:36 )  PTT:26.3 sec  Urinalysis Basic - ( 31 Aug 2023 01:56 )    Color: x / Appearance: x / SG: x / pH: x  Gluc: 90 mg/dL / Ketone: x  / Bili: x / Urobili: x   Blood: x / Protein: x / Nitrite: x   Leuk Esterase: x / RBC: x / WBC x   Sq Epi: x / Non Sq Epi: x / Bacteria: x

## 2023-08-31 NOTE — DISCHARGE NOTE PROVIDER - PROVIDER TOKENS
PROVIDER:[TOKEN:[[id number]]],PROVIDER:[TOKEN:[[id number]]] PROVIDER:[TOKEN:[[id number]]],PROVIDER:[TOKEN:[[id number]]] PROVIDER:[TOKEN:[[id number]]],PROVIDER:[TOKEN:[[id number]]]

## 2023-08-31 NOTE — DISCHARGE NOTE PROVIDER - CARE PROVIDER_API CALL
Patricia Mcdonald  Obstetrics and Gynecology  [address] [place] [address], [address]  [place] [address]  Phone: [phone]  Fax: [phone]  Follow Up Time:     Everardo Donato  Maternal/Fetal Medicine  [address] [place] [address], [address]  [place] [address]  Phone: [phone]  Fax: [phone]  Follow Up Time:    Patricia Mcdonald  Obstetrics and Gynecology  [address] [place] [address], [address]  [place] [address]  Phone: [phone]  Fax: [phone]  Follow Up Time:     Everardo Donato  Maternal/Fetal Medicine  [address] [place] [address], [address]  [place] [address]  Phone: [phone]  Fax: [phone]  Follow Up Time:    Patricia Mcdonald  Obstetrics and Gynecology  [address] [place] [address], [address]  [place] [address]  Phone: [phone]  Fax: [phone]  Follow Up Time:     Everardo Donato  Maternal/Fetal Medicine  [address] [place] [address], [address]  [place] [address]  Phone: [phone]  Fax: [phone]  Follow Up Time:

## 2023-08-31 NOTE — DISCHARGE NOTE PROVIDER - NSDCFUADDINST_GEN_ALL_CORE_FT
Dr. Donato's office will call with appointment time for Tuesday, September 5th, 2023.     Please check your temperature daily and call your OB office if fever of 100.4 or greater, vaginal bleeding, abdominal pain, malodorous or pus-like vaginal leakage.

## 2023-08-31 NOTE — H&P ADULT - ASSESSMENT
32 year old  @ 16w presents for r/o PPROM complaining of an acute leakage of fluid at 7:15p s/p amniocentesis. In triage patient found to be pooling positive and amnisure positive. Patient to be admitted to antepartum for further management of previable PPROM. Maternal and fetal status reassuring    #previable PPROM  -for MFM scan and counseling in AM  -monitor for symptoms overnight (abdominal pain akin to contractions)  -STAT CBC/CMP/Coags  -AM CBC    #maternal status  -regular diet  -PNV    #fetal status  -FHR q shift    discussed w Dr Silas Bob PGY3

## 2023-08-31 NOTE — CONSULT NOTE ADULT - ASSESSMENT
32 year old  @ 16w admitted for previable PPROM on  s/p amniocentesis. FH is present. Patient with no clinical or laboratory signs of infection.

## 2023-08-31 NOTE — H&P ADULT - NSHPPHYSICALEXAM_GEN_ALL_CORE
VS:  Vital Signs Last 24 Hrs  T(C): 37.2 (30 Aug 2023 21:35), Max: 37.2 (30 Aug 2023 21:35)  T(F): 98.96 (30 Aug 2023 21:35), Max: 98.96 (30 Aug 2023 21:35)  HR: 74 (30 Aug 2023 22:38) (73 - 113)  BP: 121/82 (30 Aug 2023 21:28) (108/68 - 121/92)  BP(mean): --  RR: 18 (30 Aug 2023 21:28) (18 - 19)  SpO2: 94% (30 Aug 2023 22:38) (87% - 100%)    Parameters below as of 30 Aug 2023 21:28  Patient On (Oxygen Delivery Method): room air      GA: NAD  Cards: RRR  Pulm: CTAB  FHR: 166bp  [place]: not angela  VE: deferred  TAUS: variable, +FM, +FHR  Speculum: Negative nitrazine, negative ferning, negative pooling  Patient noted to have increased leakage: speculum: pooling positive  Amnisure: positive VS:  Vital Signs Last 24 Hrs  T(C): 37.2 (30 Aug 2023 21:35), Max: 37.2 (30 Aug 2023 21:35)  T(F): 98.96 (30 Aug 2023 21:35), Max: 98.96 (30 Aug 2023 21:35)  HR: 74 (30 Aug 2023 22:38) (73 - 113)  BP: 121/82 (30 Aug 2023 21:28) (108/68 - 121/92)  BP(mean): --  RR: 18 (30 Aug 2023 21:28) (18 - 19)  SpO2: 94% (30 Aug 2023 22:38) (87% - 100%)    Parameters below as of 30 Aug 2023 21:28  Patient On (Oxygen Delivery Method): room air      GA: NAD  Cards: RRR  Pulm: CTAB  FHR: 166bp  [place]: not angela  VE: deferred  TAUS: variable, +FM, +FHR  Speculum: Negative nitrazine, negative ferning, negative pooling  Patient noted to have increased leakage: speculum: pooling positive  Amnisure: positive VS:  Vital Signs Last 24 Hrs  T(C): 37.2 (30 Aug 2023 21:35), Max: 37.2 (30 Aug 2023 21:35)  T(F): 98.96 (30 Aug 2023 21:35), Max: 98.96 (30 Aug 2023 21:35)  HR: 74 (30 Aug 2023 22:38) (73 - 113)  BP: 121/82 (30 Aug 2023 21:28) (108/68 - 121/92)  BP(mean): --  RR: 18 (30 Aug 2023 21:28) (18 - 19)  SpO2: 94% (30 Aug 2023 22:38) (87% - 100%)    Parameters below as of 30 Aug 2023 21:28  Patient On (Oxygen Delivery Method): room air      GA: NAD  Cards: RRR  Pulm: CTAB  FHR: 166bp  [place]: not angela  VE: deferred  TAUS: variable, +FM, +FHR  Speculum: Negative nitrazine, negative ferning, negative pooling  Patient noted to have increased leakage: speculum: pooling positive  Amnisure: positive

## 2023-08-31 NOTE — CONSULT NOTE ADULT - ATTENDING COMMENTS
LORNA attendiny/o  now at 16+0, s/p amniocentesis done yesterday who was admitted for previable PPROM (+pooling, + Amnisure) Patient reports leakage of fluid beginning at 615pm following amniocentesis which has now subsided.   Sonogram today revealed low fluid c/w rupture of membranes.   Patient currently denies LOF, VB, abdominal pain, vaginal discharge, fever or chills.   Abdomen soft, NTTP and vitals WNL. WBC: 8.5  At this time there are no signs of infection therefore patient extensively counseled on management options. She is aware that pregnancy termination is an option until 26 weeks in [facility]. Alternative option includes expectant management as an outpatient with close monitoring for any signs of miscarriage or infection.   We discussed that unlike spontaneous rupture of membranes, resealing and reaccumulation of fluid is much more likely with favorable outcomes. Although studies are small, some data shows reaccumulation of normal fluid within 1 month in up to 72% of patients with survival >90%. Different scenarios of outcomes in the setting of ROM without resealing were discussed with patient including risks of prematurity as well as effect on organ development (lungs, skeletal).   After shared decision making patient elected expectant management and strict precautions given as outpatient. She will monitor daily Temp and f/u in the office for repeat scan on tuesday.   Upon discharge LFTs were noted to be elevated.  Discussed management with Dr Castellanos as well as plan to repeat LFTs in the office and if elevated again appropriate workup to be completed. This can include testing for viral etiologies, autoimmune, meds, fatty liver with RUQ sonogram.     Dr Cody Mcleod I was present and participated in all key portions of assessment and plan. LORNA attendiny/o  now at 16+0, s/p amniocentesis done yesterday who was admitted for previable PPROM (+pooling, + Amnisure) Patient reports leakage of fluid beginning at 615pm following amniocentesis which has now subsided.   Sonogram today revealed low fluid c/w rupture of membranes.   Patient currently denies LOF, VB, abdominal pain, vaginal discharge, fever or chills.   Abdomen soft, NTTP and vitals WNL. WBC: 8.5  At this time there are no signs of infection therefore patient extensively counseled on management options. She is aware that pregnancy termination is an option until 26 weeks in [facility]. Alternative option includes expectant management as an outpatient with close monitoring for any signs of miscarriage or infection.   We discussed that unlike spontaneous rupture of membranes, resealing and reaccumulation of fluid is much more likely with favorable outcomes. Although studies are small, some data shows reaccumulation of normal fluid within 1 month in up to 72% of patients with survival >90%. Different scenarios of outcomes in the setting of ROM without resealing were discussed with patient including risks of prematurity as well as effect on organ development (lungs, skeletal).   After shared decision making patient elected expectant management and strict precautions given as outpatient. She will monitor daily Temp and f/u in the office for repeat scan on tuesday.   Upon discharge LFTs were noted to be elevated.  Discussed management with Dr Castellanos as well as plan to repeat LFTs in the office and if elevated again appropriate workup to be completed. This can include testing for viral etiologies, autoimmune, meds, fatty liver with RUQ sonogram.     Dr Cody Mcleod I was present and participated in all key portions of assessment and plan. LORNA attendiny/o  now at 16+0, s/p amniocentesis done yesterday who was admitted for previable PPROM (+pooling, + Amnisure) Patient reports leakage of fluid beginning at 615pm following amniocentesis which has now subsided.   Sonogram today revealed low fluid c/w rupture of membranes.   Patient currently denies LOF, VB, abdominal pain, vaginal discharge, fever or chills.   Abdomen soft, NTTP and vitals WNL. WBC: 8.5  At this time there are no signs of infection therefore patient extensively counseled on management options. She is aware that pregnancy termination is an option until 26 weeks in [facility]. Alternative option includes expectant management as an outpatient with close monitoring for any signs of miscarriage or infection.   We discussed that unlike spontaneous rupture of membranes, resealing and reaccumulation of fluid is much more likely with favorable outcomes. Although studies are small, some data shows reaccumulation of normal fluid within 1 month in up to 72% of patients with survival >90%. Different scenarios of outcomes in the setting of ROM without resealing were discussed with patient including risks of prematurity as well as effect on organ development (lungs, skeletal).   After shared decision making patient elected expectant management and strict precautions given as outpatient. She will monitor daily Temp and f/u in the office for repeat scan on tuesday.   Upon discharge LFTs were noted to be elevated.  Discussed management with Dr Castellanos as well as plan to repeat LFTs in the office and if elevated again appropriate workup to be completed. This can include testing for viral etiologies, autoimmune, meds, fatty liver with RUQ sonogram.     Dr Cody Mcleod I was present and participated in all key portions of assessment and plan.

## 2023-08-31 NOTE — DISCHARGE NOTE NURSING/CASE MANAGEMENT/SOCIAL WORK - NSDCPEFALRISK_GEN_ALL_CORE
For information on Fall & Injury Prevention, visit: [email or web address]://www.[facility]/news/fall-prevention-protects-and-maintains-health-and-mobility OR  https://www.[facility]/news/fall-prevention-tips-to-avoid-injury OR  https://www.cdc.gov/steadi/patient.html For information on Fall & Injury Prevention, visit: https://www.[facility]/news/fall-prevention-protects-and-maintains-health-and-mobility OR  https://www.[facility]/news/fall-prevention-tips-to-avoid-injury OR  https://www.cdc.gov/steadi/patient.html For information on Fall & Injury Prevention, visit: https://www.[facility]/news/fall-prevention-protects-and-maintains-health-and-mobility OR  https://www.[facility]/news/fall-prevention-tips-to-avoid-injury OR  https://www.cdc.gov/steadi/patient.html

## 2023-08-31 NOTE — CONSULT NOTE ADULT - NSCONSULTADDITIONALINFOA_GEN_ALL_CORE
MFM Fellow Addendum:    Patient is a 33 YO  at 16w0d s/p amniocentesis (23) presenting with post-procedural rupture of membranes. Patient reports leakage of clear fluid and dark brown spotting yesterday evening, and she denies further leakage or spotting this morning.

## 2023-08-31 NOTE — DISCHARGE NOTE PROVIDER - HOSPITAL COURSE
Admitted at 15+6 on day of amniocentesis with evidence of ruptured membranes. Patient without signs/symptoms of infection. FH present. MFM consultation, discussion of risks/benefits/alternatives to expectant management including dilation & evacuation and induction of labor. At this time, patient would like to proceed with expectant management.

## 2023-08-31 NOTE — OB RN PATIENT PROFILE - RESPIRATORY RATE (BREATHS/MIN)
Topical Clindamycin Counseling: Patient counseled that this medication may cause skin irritation or allergic reactions.  In the event of skin irritation, the patient was advised to reduce the amount of the drug applied or use it less frequently.   The patient verbalized understanding of the proper use and possible adverse effects of clindamycin.  All of the patient's questions and concerns were addressed. 18

## 2023-08-31 NOTE — OB RN PATIENT PROFILE - PRO RUBELLA INFANT
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   3/24/23 labs were sent to us.  The white blood cells 3.7, hemoglobin 13, mcv 104, platelets low at 86. It does not appear Dr. Robledo office ran the kidney function test.   Pt says he has gained some weight and not feeling like fluid.  3/24/23  Patient was recently hospitalized due to GI bleed and underwent antegrade obliteration of varices using Sclerosis and embolization.  He was seen hematology.  He had a colonoscopy and EGD while in the hospital.  He had large bleeding rectal varices and a 5 mm polyp in the sigmoid colon.  He underwent paracentesis by IR with 4 L removed.  He is pending IR evaluation for possible prophylactic treatment of large perigastric collaterals with may be BRTO/CARTI 1. No mention of tips as primary due to risk for pse.   He had a CT scan while in the hospital showing a nonocclusive clot and given the bleed options for anticoagulation were limited.    Also his MELD score is only 16 and so he would not be higher priority for transplant.    He says that in a week of the hospital the ascites.   He is taking the water pills lasix qd and aldactone qd.  Not losing weight.  Weight 183 and when left hospital 177.  3/17 He had paracentesis last with and 6 L removed.   Delma said was prior on the aldactone 2 times a week and we will increase this.   recap Pt says he taking the lasix alone.  Not taking the aldactone.  Discussed need for this.    January 4 MRI back. Lower thorax where seen appeared to be normal. Liver showed no fat or iron.  They do see changes of chronic liver disease with nodular contour, lobar redistribution, and fissural widening.  No suspicious lesions seen. Patent portal vein seen. Nonspecific mild edema seen in the gallbladder likely felt related to your chronic liver disease.  No gallstones seen.  No bile duct dilation. Spleen enlarged up to 17 cm with some Gamna-Patricia bodies seen. Pancreas, adrenal glands, and kidneys appear normal. Possible circumferential wall thickening seen of the ascending colon and nonspecific and may be related due to portal colopathy from the liver disease. Perigastric and perisplenic varices seen.  Probable splenorenal shunt seen.  Portal vein, splenic vein, and SMV vein. They do see moderate ascites still present so we need to keep working on the diuretics for that as we discussed. Overall no suspicious lesions seen. Called pt re the mri report and left brief vmail and send via [employer] portal message.  He is on a low salt diet.  In 2019 he had ascites and had an u/s in nov 2019 that showed trace ascites and cirrhosis, splenomegaly no lesion noted.  December 3, 2021 ultrasound shows the liver to be increased/coarsened echotexture and nodular contour compatible with cirrhosis. There is a 9 x 6 x 10 mm subcapsular hypoechoic focus seen along the medial margin of the liver that they favored to represent trace perihepatic fluid. Previously measured hyperechoic focus in the liver was not confidently seen. No bile duct dilation seen and common bile duct 4.8 mm. Gallbladder unremarkable. Right kidney 10.9 cm and left kidney 10.9 cm. No hydronephrosis. Spleen enlarged at 15.7 cm. Moderate to large volume perihepatic ascites seen. May want to consider MRI to further assess the fluid issues.  January 27 2021 colonoscopy with Dr. Melchor notes a diminutive polyp in the descending colon there was removed with cold forceps a diminutive polyp in the transverse colon that was removed with cold forcep scattered diverticuli seen in the left colon nonbleeding rectal varices and prior hemorrhoid surgery scar was seen.  January 27 2021 EGD showed post variceal banding scar lower third esophagus no appreciable ascites single greater than 50 mm sessile polyps in the gastric antrum and biopsies were taken for histology. A repeat EGD was recommended for 1 year. Gastric biopsy came back inflammatory type polyp with foveolar hyperplasia and granulation tissue. The transverse colon and ascending colon polyps were tubular adenomas.  He was to do april egd with DR Melchor and to do end of august 2022.  Jan 20 mri: Morphologic changes of chronic liver disease and stigmata of portal hypertension with abdominal varices and splenomegaly. No HCC seen. No liver fat or iron. Morphologic changes of chronic liver disease including nodular contour, lobar redistribution, fissural widening, and diffuse background of delayed reticular enhancement compatible with fibrosis. Portal vein is patent. Spleen is enlarged measuring up to 17.8. Pancreas and kidneys normal. Small perigastric and perisplenic varices are present. Small volume perihepatic ascites.  Dec 2020 u.s: cirrhotic appearing liver. Small 5x8x6 mm lesion seen in right lobe and indeterminate. They recommend mri.  Liver patent vessels.  Spleen enlarged. Trace ascites. Spleen 16cm.  Right kidney 11.1cm. Pancreas normal. Common bile duct 3.4mm.   Sept 2020: U.s: they see signs of cirrhosis, with signs of portal hypertension. Small volume ascites and splenomegaly.  No suspicious liver lesions. Patent vessels. Spleen 16cm enlarged.  Right kidney 11.6cm. Pancreas normal. No stones in gallbladder. No liver lesions.  2-4-20 Dr Melchor did egd: post variceal scar in lower esophagus. Grade 1-2 varices in lower third of esophagus and two bands placed with complete eradication. single 15mm polyp in gastric antrum with adjacent scar from prior incomplete polypectomy. Removed hot snare. two 4-8mm sessile polyps in gastric bdy, Hot snare.  He has not had one since and to do Dec 16 2020 with Dr Melchor and he will do colon also.  His last colonoscopy was in December 2019 there was a fair prep.  He had a few polyps which were removed.    path: Pathologic Diagnosis STOMACH, POLYPS, POLYPECTOMY: HYPERPLASTIC POLYP (FRAGMENTS OF) WITH INFLAMMATORY CHANGE, SUPERFICIAL EROSION, GRANULATION TISSUE FORMATION AND CHANGES SUGGESTIVE OF MUCOSAL PROLAPSE.   ag Electronically Signed by Aarmis Silverio MD, PhD, Pathologist Location: [facility]  1-28-20 u.s with hepatic cirrhosis and no focal mass seen and splenomegaly seen.  right kidney 12.3cm.  imaged pancreas portions unremarkable. No gallstones. cbd 3mm.  Liver irregular capsule.   Egd 12/3/19  grade 2 varices were banded. single 20mm sessile polyp resection was incomplete due to polypectomy being technically difficult and complex.   Document Type: US Abdomen Complete Document Date: August 23, 2019 17:33   Reason For Exam Liver dz, HTN  DM   Lower extremity edema  REPORT EXAM: US Abdomen Complete  CLINICAL INDICATION: Liver disorder specified;Liver dz, HTN  DM   Lower extremity edema. DM   HTN  Liver DZ  TECHNIQUE: Axial and longitudinal images were obtained of the upper abdomen using real-time ultrasound. [id number]  COMPARISON: Remote MRI of 11/17/2015  FINDINGS:  Liver: Diffuse cirrhotic changes. No lesions. Portal vein is patent, peak velocity approximately 10 cm/s. Hepatic veins are patent.  Bile Ducts: No dilated intrahepatic biliary radicles. Common bile duct measu2.4 mm.  Gallbladder: Normal. Cheney's sign is negative.  Pancreas: Normal.  Right Kidney: Measures 11.6 cm. Normal echogenicity. No hydronephrosis. No focal lesion  Left Kidney: Measures 10.9 cm. Normal echogenicity. No hydronephrosis. No focal lesion  Spleen: Measures: 16.5 cm. Homogeneous appearance.  Aorta: Not well seen  IVC: Normal proximally, not imaged distally.   Other: Small quantities of scattered simple appearing ascitic fluid.  IMPRESSION:    Redemonstrated cirrhotic changes within the liver. No focal lesion identified. Preserved hepatopedal portal flow. Portal hypertension changes as evidenced by small quantities of ascites and splenomegaly.
immune

## 2023-08-31 NOTE — PROGRESS NOTE ADULT - ASSESSMENT
32 year old  @ 16w presents for r/o PPROM complaining of an acute leakage of fluid at 7:15p on  s/p amniocentesis. In triage patient found to be pooling positive and amnisure positive. Patient to be admitted to antepartum for further management of previable PPROM.  Maternal and fetal status reassuring.     #previable PPROM  -Patient counseled on D&E vs IOL, at this time, patient and partner would like to proceed with D&E  -MFM consultation regarding expectant management and scan this AM  -No signs/symptoms of PTL  -AM CBC, fibrinogen wnl    #maternal status  -regular diet  -PNV    #fetal status  -FHR q shift    EVERTON Bunch, PGY3

## 2023-08-31 NOTE — PROGRESS NOTE ADULT - ATTENDING COMMENTS
Patient seen and examined  After extensive discussion with pt and MFM- pt will continue with conservative management and observation  pt to be d/c home and f/u tuesday at [facility]

## 2023-08-31 NOTE — DISCHARGE NOTE NURSING/CASE MANAGEMENT/SOCIAL WORK - PATIENT PORTAL LINK FT
You can access the FollowMyHealth Patient Portal offered by [facility] by registering at the following website: [email or web address]. By joining [employer]’s FollowMyHealth portal, you will also be able to view your health information using other applications (apps) compatible with our system. You can access the FollowMyHealth Patient Portal offered by [facility] by registering at the following website: [email or web address]. By joining [employer]’s FollowMyHealth portal, you will also be able to view your health information using other applications (apps) compatible with our system. You can access the FollowMyHealth Patient Portal offered by [facility] by registering at the following website: [email or web address]. By joining [employer]’s FollowMyHealth portal, you will also be able to view your health information using other applications (apps) compatible with our system.

## 2023-08-31 NOTE — OB RN PATIENT PROFILE - FALL HARM RISK - UNIVERSAL INTERVENTIONS
Bed in lowest position, wheels locked, appropriate side rails in place/Call bell, personal items and telephone in reach/Instruct patient to call for assistance before getting out of bed or chair/Non-slip footwear when patient is out of bed/[place] to call system/Physically safe environment - no spills, clutter or unnecessary equipment/Purposeful Proactive Rounding/Room/bathroom lighting operational, light cord in reach Bed in lowest position, wheels locked, appropriate side rails in place/Call bell, personal items and telephone in reach/Instruct patient to call for assistance before getting out of bed or chair/Non-slip footwear when patient is out of bed/[place] to call system/Physically safe environment - no spills, clutter or unnecessary equipment/Purposeful Proactive Rounding/Room/bathroom lighting operational, light cord in reach Bed in lowest position, wheels locked, appropriate side rails in place/Call bell, personal items and telephone in reach/Instruct patient to call for assistance before getting out of bed or chair/Non-slip footwear when patient is out of bed/[place] to call system/Physically safe environment - no spills, clutter or unnecessary equipment/Purposeful Proactive Rounding/Room/bathroom lighting operational, light cord in reach

## 2023-08-31 NOTE — DISCHARGE NOTE PROVIDER - CARE PROVIDERS DIRECT ADDRESSES
,[email or web address] IslandIceCure Medical.[email or web address] IslandGeneWeave BiosciencesCarlsbad Medical Center.net ,[email or web address] ,[email or web address] [email or web address] [email or web address] General Hospital.[email or web address]

## 2023-08-31 NOTE — PROGRESS NOTE ADULT - SUBJECTIVE AND OBJECTIVE BOX
PGY 3 Antepartum Note    Patient seen and examined at bedside in NAD. Reports continued leakage. No fevers, chills, nausea, vomiting, abdominal pain.     T(F): 98 (06:05)  HR: 93 (06:05)  BP: 116/71 (06:05)  RR: 18 (06:05)    FH present upon admission    Gen: NAD, tearful  SVE: deferred    Medications:    levothyroxine: 50 MICROGram(s) (08-31-23 @ 06:02)      Labs:                        12.2   8.51  )-----------( 227      ( 31 Aug 2023 06:36 )             34.8     08-31    136  |  101  |  6<L>  ----------------------------<  90  4.2   |  18<L>  |  0.55    Ca    9.3      31 Aug 2023 01:56    TPro  7.1  /  Alb  4.1  /  TBili  0.2  /  DBili  x   /  AST  47<H>  /  ALT  95<H>  /  AlkPhos  92  08-31      Urinalysis Basic - ( 31 Aug 2023 01:56 )    Color: x / Appearance: x / SG: x / pH: x  Gluc: 90 mg/dL / Ketone: x  / Bili: x / Urobili: x   Blood: x / Protein: x / Nitrite: x   Leuk Esterase: x / RBC: x / WBC x   Sq Epi: x / Non Sq Epi: x / Bacteria: x

## 2023-08-31 NOTE — H&P ADULT - HISTORY OF PRESENT ILLNESS
Pt is a 33y/o  at 15+6 currently POD#0 s/p amniocentesis presents to triage for r/o PPROM. Patient states that at 6:15p this evening patient felt warm fluid in her underwear that soaked her underwear but not her pants. Subsequently decided to come to r/o rupture. Denies Cx, VB.  Prenatal course c/b...  1. low PAPPA with low risk NIPs patient s/p Amnio (40cc) on  due to concerns for trisomy 21    ATU (): variable, posterior placenta, 123g    OBHx: Primagravid  GynHx: Denies cysts, fibroids, abnormal paps, STIs  PMHx: Herniated discs  PSHx: Open appendectomy, Back Surgery x2  Med: ASA, PNV, Levothyroxine  All: NKDA  SH: denies toxic habits x3

## 2023-09-05 ENCOUNTER — APPOINTMENT (OUTPATIENT)
Dept: ANTEPARTUM | Facility: CLINIC | Age: 33
End: 2023-09-05
Payer: COMMERCIAL

## 2023-09-05 ENCOUNTER — ASOB RESULT (OUTPATIENT)
Age: 33
End: 2023-09-05

## 2023-09-05 DIAGNOSIS — Z36.9 ENCOUNTER FOR ANTENATAL SCREENING, UNSPECIFIED: ICD-10-CM

## 2023-09-05 PROCEDURE — 76816 OB US FOLLOW-UP PER FETUS: CPT

## 2023-09-06 LAB
ALBUMIN SERPL ELPH-MCNC: 4.2 G/DL
ALP BLD-CCNC: 92 U/L
ALT SERPL-CCNC: 42 U/L
ANION GAP SERPL CALC-SCNC: 19 MMOL/L
AST SERPL-CCNC: 28 U/L
BASOPHILS # BLD AUTO: 0.07 K/UL
BASOPHILS NFR BLD AUTO: 0.6 %
BILIRUB SERPL-MCNC: 0.2 MG/DL
BUN SERPL-MCNC: 9 MG/DL
CALCIUM SERPL-MCNC: 9.7 MG/DL
CHLORIDE SERPL-SCNC: 99 MMOL/L
CO2 SERPL-SCNC: 20 MMOL/L
CREAT SERPL-MCNC: 0.54 MG/DL
EGFR: 126 ML/MIN/1.73M2
EOSINOPHIL # BLD AUTO: 0.2 K/UL
EOSINOPHIL NFR BLD AUTO: 1.7 %
GLUCOSE SERPL-MCNC: 33 MG/DL
HCT VFR BLD CALC: 39.2 %
HGB BLD-MCNC: 13.4 G/DL
IMM GRANULOCYTES NFR BLD AUTO: 0.3 %
LYMPHOCYTES # BLD AUTO: 1.72 K/UL
LYMPHOCYTES NFR BLD AUTO: 14.6 %
MAN DIFF?: NORMAL
MCHC RBC-ENTMCNC: 31.3 PG
MCHC RBC-ENTMCNC: 34.2 GM/DL
MCV RBC AUTO: 91.6 FL
MONOCYTES # BLD AUTO: 0.69 K/UL
MONOCYTES NFR BLD AUTO: 5.8 %
NEUTROPHILS # BLD AUTO: 9.09 K/UL
NEUTROPHILS NFR BLD AUTO: 77 %
PLATELET # BLD AUTO: 276 K/UL
POTASSIUM SERPL-SCNC: 3.9 MMOL/L
PROT SERPL-MCNC: 6.5 G/DL
RBC # BLD: 4.28 M/UL
RBC # FLD: 11.5 %
SODIUM SERPL-SCNC: 139 MMOL/L
WBC # FLD AUTO: 11.81 K/UL

## 2023-09-07 ENCOUNTER — NON-APPOINTMENT (OUTPATIENT)
Age: 33
End: 2023-09-07

## 2023-09-08 ENCOUNTER — ASOB RESULT (OUTPATIENT)
Age: 33
End: 2023-09-08

## 2023-09-08 ENCOUNTER — NON-APPOINTMENT (OUTPATIENT)
Age: 33
End: 2023-09-08

## 2023-09-08 ENCOUNTER — APPOINTMENT (OUTPATIENT)
Dept: ANTEPARTUM | Facility: CLINIC | Age: 33
End: 2023-09-08
Payer: COMMERCIAL

## 2023-09-08 PROCEDURE — 76815 OB US LIMITED FETUS(S): CPT

## 2023-09-11 ENCOUNTER — NON-APPOINTMENT (OUTPATIENT)
Age: 33
End: 2023-09-11

## 2023-09-14 ENCOUNTER — ASOB RESULT (OUTPATIENT)
Age: 33
End: 2023-09-14

## 2023-09-14 ENCOUNTER — APPOINTMENT (OUTPATIENT)
Dept: ANTEPARTUM | Facility: CLINIC | Age: 33
End: 2023-09-14
Payer: COMMERCIAL

## 2023-09-14 PROCEDURE — 76815 OB US LIMITED FETUS(S): CPT

## 2023-09-20 ENCOUNTER — NON-APPOINTMENT (OUTPATIENT)
Age: 33
End: 2023-09-20

## 2023-09-20 ENCOUNTER — APPOINTMENT (OUTPATIENT)
Dept: OBGYN | Facility: CLINIC | Age: 33
End: 2023-09-20
Payer: COMMERCIAL

## 2023-09-20 PROCEDURE — 0502F SUBSEQUENT PRENATAL CARE: CPT

## 2023-09-22 ENCOUNTER — TRANSCRIPTION ENCOUNTER (OUTPATIENT)
Age: 33
End: 2023-09-22

## 2023-09-22 DIAGNOSIS — E03.9 HYPOTHYROIDISM, UNSPECIFIED: ICD-10-CM

## 2023-09-22 RX ORDER — LEVOTHYROXINE SODIUM 0.05 MG/1
50 TABLET ORAL DAILY
Qty: 30 | Refills: 0 | Status: ACTIVE | COMMUNITY
Start: 2023-09-22 | End: 1900-01-01

## 2023-09-28 ENCOUNTER — ASOB RESULT (OUTPATIENT)
Age: 33
End: 2023-09-28

## 2023-09-28 ENCOUNTER — APPOINTMENT (OUTPATIENT)
Dept: ANTEPARTUM | Facility: CLINIC | Age: 33
End: 2023-09-28
Payer: COMMERCIAL

## 2023-09-28 PROCEDURE — 76811 OB US DETAILED SNGL FETUS: CPT

## 2023-10-09 ENCOUNTER — APPOINTMENT (OUTPATIENT)
Dept: OBGYN | Facility: CLINIC | Age: 33
End: 2023-10-09
Payer: COMMERCIAL

## 2023-10-09 DIAGNOSIS — Z23 ENCOUNTER FOR IMMUNIZATION: ICD-10-CM

## 2023-10-09 DIAGNOSIS — Z34.02 ENCOUNTER FOR SUPERVISION OF NORMAL FIRST PREGNANCY, SECOND TRIMESTER: ICD-10-CM

## 2023-10-09 PROCEDURE — 0502F SUBSEQUENT PRENATAL CARE: CPT

## 2023-10-09 PROCEDURE — G0008: CPT

## 2023-10-09 PROCEDURE — 90686 IIV4 VACC NO PRSV 0.5 ML IM: CPT

## 2023-10-30 ENCOUNTER — NON-APPOINTMENT (OUTPATIENT)
Age: 33
End: 2023-10-30

## 2023-10-31 ENCOUNTER — TRANSCRIPTION ENCOUNTER (OUTPATIENT)
Age: 33
End: 2023-10-31

## 2023-11-02 PROBLEM — Z34.02 NORMAL FIRST PREGNANCY IN SECOND TRIMESTER: Status: ACTIVE | Noted: 2023-08-28

## 2023-11-06 ENCOUNTER — APPOINTMENT (OUTPATIENT)
Dept: OBGYN | Facility: CLINIC | Age: 33
End: 2023-11-06
Payer: COMMERCIAL

## 2023-11-06 DIAGNOSIS — Z13.1 ENCOUNTER FOR SCREENING FOR DIABETES MELLITUS: ICD-10-CM

## 2023-11-06 DIAGNOSIS — Z34.93 ENCOUNTER FOR SUPERVISION OF NORMAL PREGNANCY, UNSPECIFIED, THIRD TRIMESTER: ICD-10-CM

## 2023-11-06 PROCEDURE — 0502F SUBSEQUENT PRENATAL CARE: CPT

## 2023-11-07 LAB
25(OH)D3 SERPL-MCNC: 33.2 NG/ML
BASOPHILS # BLD AUTO: 0.11 K/UL
BASOPHILS NFR BLD AUTO: 1.3 %
BLD GP AB SCN SERPL QL: NORMAL
EOSINOPHIL # BLD AUTO: 0.58 K/UL
EOSINOPHIL NFR BLD AUTO: 7 %
GLUCOSE 1H P 50 G GLC PO SERPL-MCNC: 98 MG/DL
HCT VFR BLD CALC: 37.6 %
HGB BLD-MCNC: 12.1 G/DL
HIV1+2 AB SPEC QL IA.RAPID: NONREACTIVE
IMM GRANULOCYTES NFR BLD AUTO: 0.5 %
LYMPHOCYTES # BLD AUTO: 1.63 K/UL
LYMPHOCYTES NFR BLD AUTO: 19.7 %
MAN DIFF?: NORMAL
MCHC RBC-ENTMCNC: 32.2 GM/DL
MCHC RBC-ENTMCNC: 32.5 PG
MCV RBC AUTO: 101.1 FL
MONOCYTES # BLD AUTO: 0.56 K/UL
MONOCYTES NFR BLD AUTO: 6.8 %
NEUTROPHILS # BLD AUTO: 5.36 K/UL
NEUTROPHILS NFR BLD AUTO: 64.7 %
PLATELET # BLD AUTO: 242 K/UL
RBC # BLD: 3.72 M/UL
RBC # FLD: 12.7 %
T PALLIDUM AB SER QL IA: NEGATIVE
TSH SERPL-ACNC: 2.64 UIU/ML
WBC # FLD AUTO: 8.28 K/UL

## 2023-11-17 ENCOUNTER — APPOINTMENT (OUTPATIENT)
Dept: ANTEPARTUM | Facility: CLINIC | Age: 33
End: 2023-11-17
Payer: COMMERCIAL

## 2023-11-17 ENCOUNTER — INPATIENT (INPATIENT)
Facility: HOSPITAL | Age: 33
LOS: 5 days | Discharge: HOME | End: 2023-11-23
Attending: STUDENT IN AN ORGANIZED HEALTH CARE EDUCATION/TRAINING PROGRAM | Admitting: STUDENT IN AN ORGANIZED HEALTH CARE EDUCATION/TRAINING PROGRAM
Payer: COMMERCIAL

## 2023-11-17 ENCOUNTER — ASOB RESULT (OUTPATIENT)
Age: 33
End: 2023-11-17

## 2023-11-17 ENCOUNTER — NON-APPOINTMENT (OUTPATIENT)
Age: 33
End: 2023-11-17

## 2023-11-17 VITALS — DIASTOLIC BLOOD PRESSURE: 104 MMHG | SYSTOLIC BLOOD PRESSURE: 162 MMHG

## 2023-11-17 VITALS — HEART RATE: 82 BPM | OXYGEN SATURATION: 99 %

## 2023-11-17 DIAGNOSIS — O26.899 OTHER SPECIFIED PREGNANCY RELATED CONDITIONS, UNSPECIFIED TRIMESTER: ICD-10-CM

## 2023-11-17 DIAGNOSIS — Z34.80 ENCOUNTER FOR SUPERVISION OF OTHER NORMAL PREGNANCY, UNSPECIFIED TRIMESTER: ICD-10-CM

## 2023-11-17 DIAGNOSIS — Z90.49 ACQUIRED ABSENCE OF OTHER SPECIFIED PARTS OF DIGESTIVE TRACT: Chronic | ICD-10-CM

## 2023-11-17 LAB
ALBUMIN SERPL ELPH-MCNC: 3.1 G/DL — LOW (ref 3.3–5)
ALP SERPL-CCNC: 181 U/L — HIGH (ref 40–120)
ALT FLD-CCNC: 43 U/L — SIGNIFICANT CHANGE UP (ref 10–45)
ANION GAP SERPL CALC-SCNC: 13 MMOL/L — SIGNIFICANT CHANGE UP (ref 5–17)
APPEARANCE UR: CLEAR — SIGNIFICANT CHANGE UP
APTT BLD: 26 SEC — SIGNIFICANT CHANGE UP (ref 24.5–35.6)
AST SERPL-CCNC: 34 U/L — SIGNIFICANT CHANGE UP (ref 10–40)
BACTERIA # UR AUTO: ABNORMAL /HPF
BASOPHILS # BLD AUTO: 0.14 K/UL — SIGNIFICANT CHANGE UP (ref 0–0.2)
BASOPHILS NFR BLD AUTO: 1.2 % — SIGNIFICANT CHANGE UP (ref 0–2)
BILIRUB SERPL-MCNC: 0.2 MG/DL — SIGNIFICANT CHANGE UP (ref 0.2–1.2)
BILIRUB UR-MCNC: NEGATIVE — SIGNIFICANT CHANGE UP
BUN SERPL-MCNC: 19 MG/DL — SIGNIFICANT CHANGE UP (ref 7–23)
CALCIUM SERPL-MCNC: 8.9 MG/DL — SIGNIFICANT CHANGE UP (ref 8.4–10.5)
CAST: 0 /LPF — SIGNIFICANT CHANGE UP (ref 0–4)
CHLORIDE SERPL-SCNC: 103 MMOL/L — SIGNIFICANT CHANGE UP (ref 96–108)
CO2 SERPL-SCNC: 19 MMOL/L — LOW (ref 22–31)
COLOR SPEC: YELLOW — SIGNIFICANT CHANGE UP
CREAT ?TM UR-MCNC: 35 MG/DL — SIGNIFICANT CHANGE UP
CREAT SERPL-MCNC: 0.87 MG/DL — SIGNIFICANT CHANGE UP (ref 0.5–1.3)
DIFF PNL FLD: ABNORMAL
EGFR: 91 ML/MIN/1.73M2 — SIGNIFICANT CHANGE UP
EOSINOPHIL # BLD AUTO: 0.38 K/UL — SIGNIFICANT CHANGE UP (ref 0–0.5)
EOSINOPHIL NFR BLD AUTO: 3.2 % — SIGNIFICANT CHANGE UP (ref 0–6)
FIBRINOGEN PPP-MCNC: 381 MG/DL — SIGNIFICANT CHANGE UP (ref 200–445)
GLUCOSE SERPL-MCNC: 71 MG/DL — SIGNIFICANT CHANGE UP (ref 70–99)
GLUCOSE UR QL: NEGATIVE MG/DL — SIGNIFICANT CHANGE UP
HCT VFR BLD CALC: 37.3 % — SIGNIFICANT CHANGE UP (ref 34.5–45)
HGB BLD-MCNC: 12.7 G/DL — SIGNIFICANT CHANGE UP (ref 11.5–15.5)
IMM GRANULOCYTES NFR BLD AUTO: 0.6 % — SIGNIFICANT CHANGE UP (ref 0–0.9)
INR BLD: 0.79 RATIO — LOW (ref 0.85–1.18)
KETONES UR-MCNC: NEGATIVE MG/DL — SIGNIFICANT CHANGE UP
LDH SERPL L TO P-CCNC: 265 U/L — HIGH (ref 50–242)
LEUKOCYTE ESTERASE UR-ACNC: ABNORMAL
LYMPHOCYTES # BLD AUTO: 2.45 K/UL — SIGNIFICANT CHANGE UP (ref 1–3.3)
LYMPHOCYTES # BLD AUTO: 20.3 % — SIGNIFICANT CHANGE UP (ref 13–44)
MCHC RBC-ENTMCNC: 31.8 PG — SIGNIFICANT CHANGE UP (ref 27–34)
MCHC RBC-ENTMCNC: 34 GM/DL — SIGNIFICANT CHANGE UP (ref 32–36)
MCV RBC AUTO: 93.3 FL — SIGNIFICANT CHANGE UP (ref 80–100)
MONOCYTES # BLD AUTO: 0.8 K/UL — SIGNIFICANT CHANGE UP (ref 0–0.9)
MONOCYTES NFR BLD AUTO: 6.6 % — SIGNIFICANT CHANGE UP (ref 2–14)
NEUTROPHILS # BLD AUTO: 8.21 K/UL — HIGH (ref 1.8–7.4)
NEUTROPHILS NFR BLD AUTO: 68.1 % — SIGNIFICANT CHANGE UP (ref 43–77)
NITRITE UR-MCNC: NEGATIVE — SIGNIFICANT CHANGE UP
NRBC # BLD: 0 /100 WBCS — SIGNIFICANT CHANGE UP (ref 0–0)
PH UR: 6.5 — SIGNIFICANT CHANGE UP (ref 5–8)
PLATELET # BLD AUTO: 210 K/UL — SIGNIFICANT CHANGE UP (ref 150–400)
POTASSIUM SERPL-MCNC: 4.2 MMOL/L — SIGNIFICANT CHANGE UP (ref 3.5–5.3)
POTASSIUM SERPL-SCNC: 4.2 MMOL/L — SIGNIFICANT CHANGE UP (ref 3.5–5.3)
PROT ?TM UR-MCNC: 285 MG/DL — HIGH (ref 0–12)
PROT SERPL-MCNC: 6 G/DL — SIGNIFICANT CHANGE UP (ref 6–8.3)
PROT UR-MCNC: 300 MG/DL
PROT/CREAT UR-RTO: 8.1 RATIO — HIGH (ref 0–0.2)
PROTHROM AB SERPL-ACNC: 8.8 SEC — LOW (ref 9.5–13)
RBC # BLD: 4 M/UL — SIGNIFICANT CHANGE UP (ref 3.8–5.2)
RBC # FLD: 12.1 % — SIGNIFICANT CHANGE UP (ref 10.3–14.5)
RBC CASTS # UR COMP ASSIST: 2 /HPF — SIGNIFICANT CHANGE UP (ref 0–4)
REVIEW: SIGNIFICANT CHANGE UP
SODIUM SERPL-SCNC: 135 MMOL/L — SIGNIFICANT CHANGE UP (ref 135–145)
SP GR SPEC: 1.01 — SIGNIFICANT CHANGE UP (ref 1–1.03)
SQUAMOUS # UR AUTO: 4 /HPF — SIGNIFICANT CHANGE UP (ref 0–5)
URATE SERPL-MCNC: 7.2 MG/DL — HIGH (ref 2.5–7)
UROBILINOGEN FLD QL: 0.2 MG/DL — SIGNIFICANT CHANGE UP (ref 0.2–1)
WBC # BLD: 12.05 K/UL — HIGH (ref 3.8–10.5)
WBC # FLD AUTO: 12.05 K/UL — HIGH (ref 3.8–10.5)
WBC UR QL: 11 /HPF — HIGH (ref 0–5)

## 2023-11-17 PROCEDURE — 76816 OB US FOLLOW-UP PER FETUS: CPT

## 2023-11-17 RX ORDER — NIFEDIPINE 30 MG
30 TABLET, EXTENDED RELEASE 24 HR ORAL DAILY
Refills: 0 | Status: DISCONTINUED | OUTPATIENT
Start: 2023-11-17 | End: 2023-11-23

## 2023-11-17 RX ORDER — NIFEDIPINE 30 MG
20 TABLET, EXTENDED RELEASE 24 HR ORAL ONCE
Refills: 0 | Status: COMPLETED | OUTPATIENT
Start: 2023-11-17 | End: 2023-11-17

## 2023-11-17 RX ORDER — SODIUM CHLORIDE 9 MG/ML
1000 INJECTION, SOLUTION INTRAVENOUS
Refills: 0 | Status: DISCONTINUED | OUTPATIENT
Start: 2023-11-17 | End: 2023-11-20

## 2023-11-17 RX ORDER — MAGNESIUM SULFATE 500 MG/ML
4 VIAL (ML) INJECTION ONCE
Refills: 0 | Status: COMPLETED | OUTPATIENT
Start: 2023-11-17 | End: 2023-11-17

## 2023-11-17 RX ORDER — MAGNESIUM SULFATE 500 MG/ML
2 VIAL (ML) INJECTION
Qty: 40 | Refills: 0 | Status: DISCONTINUED | OUTPATIENT
Start: 2023-11-17 | End: 2023-11-20

## 2023-11-17 RX ORDER — LABETALOL HCL 100 MG
20 TABLET ORAL ONCE
Refills: 0 | Status: COMPLETED | OUTPATIENT
Start: 2023-11-17 | End: 2023-11-17

## 2023-11-17 RX ORDER — NIFEDIPINE 30 MG
10 TABLET, EXTENDED RELEASE 24 HR ORAL ONCE
Refills: 0 | Status: COMPLETED | OUTPATIENT
Start: 2023-11-17 | End: 2023-11-17

## 2023-11-17 RX ADMIN — Medication 50 GM/HR: at 19:54

## 2023-11-17 RX ADMIN — Medication 10 MILLIGRAM(S): at 18:46

## 2023-11-17 RX ADMIN — Medication 30 MILLIGRAM(S): at 21:13

## 2023-11-17 RX ADMIN — Medication 20 MILLIGRAM(S): at 20:37

## 2023-11-17 RX ADMIN — Medication 300 GRAM(S): at 19:10

## 2023-11-17 RX ADMIN — Medication 20 MILLIGRAM(S): at 21:10

## 2023-11-17 RX ADMIN — Medication 12 MILLIGRAM(S): at 19:56

## 2023-11-17 NOTE — OB RN TRIAGE NOTE - NS_LMP_OBGYN_ALL_OB_DT
I was under the impression that this medication is being managed by psychiatry.  I'm not sure why I am being asked to refill it.     30-May-2023

## 2023-11-17 NOTE — OB PROVIDER H&P - ASSESSMENT
Constitutional:  no fevers, no chills, no malaise  Eyes:  No visual changes  ENMT: No neck pain or stiffness, no nasal congestion, no ear pain, no throat pain  Cardiac:  No chest pain  Respiratory:  No cough or sob  GI:  No nausea, vomiting, diarrhea or abdominal pain.  :  No dysuria, frequency or burning.  MS:  No back pain, no joint pain.  Neuro:  No headache, no dizziness, no change in mental status  Skin:  No skin rash  Except as documented in the HPI,  all other systems are negative
33y  at 27+1 presenting with severe-range BPs, admitted for severe preeclampsia  - BP monitoring  - Mg   - s/p Proc 10IR  - start Pro 30XL  - BMZ  - NPO  - Continuous EFM     D/w Dr. Harry Bunch, PGY3

## 2023-11-17 NOTE — OB RN PATIENT PROFILE - NS_OBGYNHISTORY_OBGYN_ALL_OB_FT
Uterine artery doppler abnornal at 13 weeks  gestational Hypothyroidism  PProm at 16wks, resealed s/p amniocentisis

## 2023-11-17 NOTE — OB PROVIDER H&P - HISTORY OF PRESENT ILLNESS
33y  at 27+1 presenting from OB office with elevated BPs. Denies HA, blurry vision, RUQ pain, SOB.  33y  at 27+1 presenting from OB office with elevated BPs. Denies HA, blurry vision, RUQ pain, SOB.     Prenatal course c/b...  1. low PAPPA with low risk NIPs patient s/p Amnio (40cc) on  due to concerns for trisomy 21  2. Resealed Previable PPROM at 16+0 s/p amniocentesis    ATU (): vertex, posterior placenta, EFW 907g (11%)    OBHx: Primagravid  GynHx: Denies cysts, fibroids, abnormal paps, STIs  PMHx: Herniated discs  PSHx: Open appendectomy, Back Surgery x2  Med: ASA, PNV, Levothyroxine  All: NKDA  SH: denies toxic habits x3

## 2023-11-17 NOTE — OB PROVIDER H&P - ATTENDING COMMENTS
33y  at 27+1 presenting from [facility] with preeclampsia with severe features based on severe range BPs.     Prenatal course c/b...  1. low PAPPA with low risk NIPs patient s/p Amnio (40cc) on  due to concerns for trisomy 21  2. Resealed Previable PPROM at 16+0 s/p amniocentesis    Mag sulfate  Procardia IR given x2, will start standing dose  monitor labs  continuous EFM for now  BMZ    I counseled patient and her partner regarding plan for  delivery, not to pass 34 weeks and possible need for earlier delivery if clinical picture worsens, which may need to be a  delivery    Patricia Mcdonald MD 33y  at 27+1 presenting from [facility] with preeclampsia with severe features based on severe range BPs.     Prenatal course c/b...  1. low PAPPA with low risk NIPs patient s/p Amnio (40cc) on  due to concerns for trisomy 21  2. Resealed Previable PPROM at 16+0 s/p amniocentesis    Mag sulfate  Procardia IR given x2, will start standing dose  monitor labs  continuous EFM for now  BMZ    I counseled patient and her partner regarding plan for  delivery, not to pass 34 weeks and possible need for earlier delivery if clinical picture worsens, which may need to be a  delivery    Patricia Mcdonald MD 33y  at 27+1 presenting from [facility] with preeclampsia with severe features based on severe range BPs.     Prenatal course c/b...  1. low PAPPA with low risk NIPs patient s/p Amnio (40cc) on  due to concerns for trisomy 21  2. Resealed Previable PPROM at 16+0 s/p amniocentesis    Mag sulfate  Procardia IR given x2, will start standing dose  monitor labs  continuous EFM for now  BMZ    I counseled patient and her partner regarding plan for  delivery, not to pass 34 weeks and possible need for earlier delivery if clinical picture worsens, which may need to be a  delivery    Patricia Mcdonald MD

## 2023-11-17 NOTE — OB RN PATIENT PROFILE - NS PRO PT RIGHT SUPPORT PERSON
[address] [address]  PH: [phone]  FAX: [phone]        25  Kristen Hooks, :  1971  [address] [place] [address]    To Whom It May Concern:     Kristen Hooks is currently under my medical care and is being followed after left Achilles tendon repair.  She is tolerating for an 8-hour shifts at the hospital well.  She may continue with 8 hour shifts at this time.  Please reach out to our office with any additional questions or concerns.           Sincerely,     Alexei Kang DPM    
Yes

## 2023-11-17 NOTE — OB RN PATIENT PROFILE - FALL HARM RISK - UNIVERSAL INTERVENTIONS
Bed in lowest position, wheels locked, appropriate side rails in place/Call bell, personal items and telephone in reach/Instruct patient to call for assistance before getting out of bed or chair/Non-slip footwear when patient is out of bed/[place] to call system/Physically safe environment - no spills, clutter or unnecessary equipment/Purposeful Proactive Rounding/Room/bathroom lighting operational, light cord in reach Bed in lowest position, wheels locked, appropriate side rails in place/Call bell, personal items and telephone in reach/Instruct patient to call for assistance before getting out of bed or chair/Non-slip footwear when patient is out of bed/[place] to call system/Physically safe environment - no spills, clutter or unnecessary equipment/Purposeful Proactive Rounding/Room/bathroom lighting operational, light cord in reach Bed in lowest position, wheels locked, appropriate side rails in place/Call bell, personal items and telephone in reach/Instruct patient to call for assistance before getting out of bed or chair/Non-slip footwear when patient is out of bed/[place] to call system/Physically safe environment - no spills, clutter or unnecessary equipment/Purposeful Proactive Rounding/Room/bathroom lighting operational, light cord in reach

## 2023-11-17 NOTE — OB RN TRIAGE NOTE - SUICIDE SCREENING QUESTION 2
Spoke to patient of the below. Patient states he does have sinus pressure and he did have green discharge from the nose but is now clear. Patient is also asking for more information on why it says possible small vessel chronic ischemic disease.    Please advise.   No

## 2023-11-17 NOTE — OB RN TRIAGE NOTE - FALL HARM RISK - UNIVERSAL INTERVENTIONS
Bed in lowest position, wheels locked, appropriate side rails in place/Call bell, personal items and telephone in reach/Instruct patient to call for assistance before getting out of bed or chair/Non-slip footwear when patient is out of bed/[place] to call system/Physically safe environment - no spills, clutter or unnecessary equipment/Purposeful Proactive Rounding/Room/bathroom lighting operational, light cord in reach Bed in lowest position, wheels locked, appropriate side rails in place/Call bell, personal items and telephone in reach/Instruct patient to call for assistance before getting out of bed or chair/Non-slip footwear when patient is out of bed/[place] to call system/Physically safe environment - no spills, clutter or unnecessary equipment/Purposeful Proactive Rounding/Room/bathroom lighting operational, light cord in reach Bed in lowest position, wheels locked, appropriate side rails in place/Call bell, personal items and telephone in reach/Instruct patient to call for assistance before getting out of bed or chair/Non-slip footwear when patient is out of bed/[place] to call system/Physically safe environment - no spills, clutter or unnecessary equipment/Purposeful Proactive Rounding/Room/bathroom lighting operational, light cord in reach

## 2023-11-18 ENCOUNTER — TRANSCRIPTION ENCOUNTER (OUTPATIENT)
Age: 33
End: 2023-11-18

## 2023-11-18 LAB
ALBUMIN SERPL ELPH-MCNC: 2.3 G/DL — LOW (ref 3.3–5)
ALBUMIN SERPL ELPH-MCNC: 2.5 G/DL — LOW (ref 3.3–5)
ALBUMIN SERPL ELPH-MCNC: 2.9 G/DL — LOW (ref 3.3–5)
ALBUMIN SERPL ELPH-MCNC: 3 G/DL — LOW (ref 3.3–5)
ALBUMIN SERPL ELPH-MCNC: 3.2 G/DL — LOW (ref 3.3–5)
ALP SERPL-CCNC: 147 U/L — HIGH (ref 40–120)
ALP SERPL-CCNC: 149 U/L — HIGH (ref 40–120)
ALP SERPL-CCNC: 164 U/L — HIGH (ref 40–120)
ALP SERPL-CCNC: 171 U/L — HIGH (ref 40–120)
ALP SERPL-CCNC: 173 U/L — HIGH (ref 40–120)
ALP SERPL-CCNC: 190 U/L — HIGH (ref 40–120)
ALT FLD-CCNC: 71 U/L — HIGH (ref 10–45)
ALT FLD-CCNC: 72 U/L — HIGH (ref 10–45)
ALT FLD-CCNC: 80 U/L — HIGH (ref 10–45)
ALT FLD-CCNC: 81 U/L — HIGH (ref 10–45)
ALT FLD-CCNC: 82 U/L — HIGH (ref 10–45)
ALT FLD-CCNC: 83 U/L — HIGH (ref 10–45)
ANION GAP SERPL CALC-SCNC: 12 MMOL/L — SIGNIFICANT CHANGE UP (ref 5–17)
ANION GAP SERPL CALC-SCNC: 14 MMOL/L — SIGNIFICANT CHANGE UP (ref 5–17)
ANION GAP SERPL CALC-SCNC: 15 MMOL/L — SIGNIFICANT CHANGE UP (ref 5–17)
APPEARANCE UR: CLEAR — SIGNIFICANT CHANGE UP
APTT BLD: 27.4 SEC — SIGNIFICANT CHANGE UP (ref 24.5–35.6)
APTT BLD: 30 SEC — SIGNIFICANT CHANGE UP (ref 24.5–35.6)
APTT BLD: 32.2 SEC — SIGNIFICANT CHANGE UP (ref 24.5–35.6)
AST SERPL-CCNC: 47 U/L — HIGH (ref 10–40)
AST SERPL-CCNC: 60 U/L — HIGH (ref 10–40)
AST SERPL-CCNC: 62 U/L — HIGH (ref 10–40)
AST SERPL-CCNC: 63 U/L — HIGH (ref 10–40)
AST SERPL-CCNC: 71 U/L — HIGH (ref 10–40)
AST SERPL-CCNC: 74 U/L — HIGH (ref 10–40)
BACTERIA # UR AUTO: NEGATIVE /HPF — SIGNIFICANT CHANGE UP
BASOPHILS # BLD AUTO: 0 K/UL — SIGNIFICANT CHANGE UP (ref 0–0.2)
BASOPHILS # BLD AUTO: 0.02 K/UL — SIGNIFICANT CHANGE UP (ref 0–0.2)
BASOPHILS # BLD AUTO: 0.03 K/UL — SIGNIFICANT CHANGE UP (ref 0–0.2)
BASOPHILS # BLD AUTO: 0.1 K/UL — SIGNIFICANT CHANGE UP (ref 0–0.2)
BASOPHILS NFR BLD AUTO: 0 % — SIGNIFICANT CHANGE UP (ref 0–2)
BASOPHILS NFR BLD AUTO: 0.2 % — SIGNIFICANT CHANGE UP (ref 0–2)
BASOPHILS NFR BLD AUTO: 0.3 % — SIGNIFICANT CHANGE UP (ref 0–2)
BASOPHILS NFR BLD AUTO: 0.6 % — SIGNIFICANT CHANGE UP (ref 0–2)
BILIRUB SERPL-MCNC: 0.2 MG/DL — SIGNIFICANT CHANGE UP (ref 0.2–1.2)
BILIRUB SERPL-MCNC: 0.3 MG/DL — SIGNIFICANT CHANGE UP (ref 0.2–1.2)
BILIRUB UR-MCNC: NEGATIVE — SIGNIFICANT CHANGE UP
BUN SERPL-MCNC: 18 MG/DL — SIGNIFICANT CHANGE UP (ref 7–23)
BUN SERPL-MCNC: 19 MG/DL — SIGNIFICANT CHANGE UP (ref 7–23)
BUN SERPL-MCNC: 20 MG/DL — SIGNIFICANT CHANGE UP (ref 7–23)
BUN SERPL-MCNC: 21 MG/DL — SIGNIFICANT CHANGE UP (ref 7–23)
BUN SERPL-MCNC: 23 MG/DL — SIGNIFICANT CHANGE UP (ref 7–23)
BUN SERPL-MCNC: 24 MG/DL — HIGH (ref 7–23)
CALCIUM SERPL-MCNC: 6.9 MG/DL — LOW (ref 8.4–10.5)
CALCIUM SERPL-MCNC: 7.3 MG/DL — LOW (ref 8.4–10.5)
CALCIUM SERPL-MCNC: 7.6 MG/DL — LOW (ref 8.4–10.5)
CALCIUM SERPL-MCNC: 7.7 MG/DL — LOW (ref 8.4–10.5)
CALCIUM SERPL-MCNC: 8.1 MG/DL — LOW (ref 8.4–10.5)
CALCIUM SERPL-MCNC: 8.7 MG/DL — SIGNIFICANT CHANGE UP (ref 8.4–10.5)
CAST: 28 /LPF — HIGH (ref 0–4)
CHLORIDE SERPL-SCNC: 100 MMOL/L — SIGNIFICANT CHANGE UP (ref 96–108)
CHLORIDE SERPL-SCNC: 101 MMOL/L — SIGNIFICANT CHANGE UP (ref 96–108)
CHLORIDE SERPL-SCNC: 103 MMOL/L — SIGNIFICANT CHANGE UP (ref 96–108)
CHLORIDE SERPL-SCNC: 94 MMOL/L — LOW (ref 96–108)
CO2 SERPL-SCNC: 15 MMOL/L — LOW (ref 22–31)
CO2 SERPL-SCNC: 16 MMOL/L — LOW (ref 22–31)
CO2 SERPL-SCNC: 17 MMOL/L — LOW (ref 22–31)
COLOR SPEC: YELLOW — SIGNIFICANT CHANGE UP
CREAT ?TM UR-MCNC: 109 MG/DL — SIGNIFICANT CHANGE UP
CREAT SERPL-MCNC: 0.88 MG/DL — SIGNIFICANT CHANGE UP (ref 0.5–1.3)
CREAT SERPL-MCNC: 0.91 MG/DL — SIGNIFICANT CHANGE UP (ref 0.5–1.3)
CREAT SERPL-MCNC: 0.95 MG/DL — SIGNIFICANT CHANGE UP (ref 0.5–1.3)
CREAT SERPL-MCNC: 0.96 MG/DL — SIGNIFICANT CHANGE UP (ref 0.5–1.3)
CREAT SERPL-MCNC: 0.98 MG/DL — SIGNIFICANT CHANGE UP (ref 0.5–1.3)
CREAT SERPL-MCNC: 1.19 MG/DL — SIGNIFICANT CHANGE UP (ref 0.5–1.3)
DACRYOCYTES BLD QL SMEAR: SLIGHT — SIGNIFICANT CHANGE UP
DIFF PNL FLD: ABNORMAL
EGFR: 62 ML/MIN/1.73M2 — SIGNIFICANT CHANGE UP
EGFR: 79 ML/MIN/1.73M2 — SIGNIFICANT CHANGE UP
EGFR: 81 ML/MIN/1.73M2 — SIGNIFICANT CHANGE UP
EGFR: 82 ML/MIN/1.73M2 — SIGNIFICANT CHANGE UP
EGFR: 86 ML/MIN/1.73M2 — SIGNIFICANT CHANGE UP
EGFR: 89 ML/MIN/1.73M2 — SIGNIFICANT CHANGE UP
ELLIPTOCYTES BLD QL SMEAR: SLIGHT — SIGNIFICANT CHANGE UP
EOSINOPHIL # BLD AUTO: 0 K/UL — SIGNIFICANT CHANGE UP (ref 0–0.5)
EOSINOPHIL # BLD AUTO: 0.01 K/UL — SIGNIFICANT CHANGE UP (ref 0–0.5)
EOSINOPHIL NFR BLD AUTO: 0 % — SIGNIFICANT CHANGE UP (ref 0–6)
EOSINOPHIL NFR BLD AUTO: 0.1 % — SIGNIFICANT CHANGE UP (ref 0–6)
FIBRINOGEN PPP-MCNC: 359 MG/DL — SIGNIFICANT CHANGE UP (ref 200–445)
FIBRINOGEN PPP-MCNC: 378 MG/DL — SIGNIFICANT CHANGE UP (ref 200–445)
FIBRINOGEN PPP-MCNC: 442 MG/DL — SIGNIFICANT CHANGE UP (ref 200–445)
FINE GRAN CASTS #/AREA URNS AUTO: PRESENT
GLUCOSE SERPL-MCNC: 106 MG/DL — HIGH (ref 70–99)
GLUCOSE SERPL-MCNC: 108 MG/DL — HIGH (ref 70–99)
GLUCOSE SERPL-MCNC: 118 MG/DL — HIGH (ref 70–99)
GLUCOSE SERPL-MCNC: 143 MG/DL — HIGH (ref 70–99)
GLUCOSE SERPL-MCNC: 144 MG/DL — HIGH (ref 70–99)
GLUCOSE SERPL-MCNC: 174 MG/DL — HIGH (ref 70–99)
GLUCOSE UR QL: NEGATIVE MG/DL — SIGNIFICANT CHANGE UP
HCT VFR BLD CALC: 24 % — LOW (ref 34.5–45)
HCT VFR BLD CALC: 31.7 % — LOW (ref 34.5–45)
HCT VFR BLD CALC: 33.7 % — LOW (ref 34.5–45)
HCT VFR BLD CALC: 35.1 % — SIGNIFICANT CHANGE UP (ref 34.5–45)
HCT VFR BLD CALC: 35.7 % — SIGNIFICANT CHANGE UP (ref 34.5–45)
HCT VFR BLD CALC: 37.8 % — SIGNIFICANT CHANGE UP (ref 34.5–45)
HGB BLD-MCNC: 10.8 G/DL — LOW (ref 11.5–15.5)
HGB BLD-MCNC: 11.6 G/DL — SIGNIFICANT CHANGE UP (ref 11.5–15.5)
HGB BLD-MCNC: 12.1 G/DL — SIGNIFICANT CHANGE UP (ref 11.5–15.5)
HGB BLD-MCNC: 12.2 G/DL — SIGNIFICANT CHANGE UP (ref 11.5–15.5)
HGB BLD-MCNC: 12.9 G/DL — SIGNIFICANT CHANGE UP (ref 11.5–15.5)
HGB BLD-MCNC: 7.5 G/DL — LOW (ref 11.5–15.5)
HYALINE CASTS # UR AUTO: PRESENT
IMM GRANULOCYTES NFR BLD AUTO: 0.5 % — SIGNIFICANT CHANGE UP (ref 0–0.9)
IMM GRANULOCYTES NFR BLD AUTO: 0.8 % — SIGNIFICANT CHANGE UP (ref 0–0.9)
IMM GRANULOCYTES NFR BLD AUTO: 0.9 % — SIGNIFICANT CHANGE UP (ref 0–0.9)
IMM GRANULOCYTES NFR BLD AUTO: 1.1 % — HIGH (ref 0–0.9)
INR BLD: 0.79 RATIO — LOW (ref 0.85–1.18)
INR BLD: 0.86 RATIO — SIGNIFICANT CHANGE UP (ref 0.85–1.18)
INR BLD: 0.88 RATIO — SIGNIFICANT CHANGE UP (ref 0.85–1.18)
KETONES UR-MCNC: NEGATIVE MG/DL — SIGNIFICANT CHANGE UP
LDH SERPL L TO P-CCNC: 239 U/L — SIGNIFICANT CHANGE UP (ref 50–242)
LDH SERPL L TO P-CCNC: 279 U/L — HIGH (ref 50–242)
LDH SERPL L TO P-CCNC: 282 U/L — HIGH (ref 50–242)
LDH SERPL L TO P-CCNC: 313 U/L — HIGH (ref 50–242)
LDH SERPL L TO P-CCNC: 329 U/L — HIGH (ref 50–242)
LDH SERPL L TO P-CCNC: 352 U/L — HIGH (ref 50–242)
LEUKOCYTE ESTERASE UR-ACNC: NEGATIVE — SIGNIFICANT CHANGE UP
LYMPHOCYTES # BLD AUTO: 0.8 K/UL — LOW (ref 1–3.3)
LYMPHOCYTES # BLD AUTO: 1.04 K/UL — SIGNIFICANT CHANGE UP (ref 1–3.3)
LYMPHOCYTES # BLD AUTO: 1.07 K/UL — SIGNIFICANT CHANGE UP (ref 1–3.3)
LYMPHOCYTES # BLD AUTO: 1.09 K/UL — SIGNIFICANT CHANGE UP (ref 1–3.3)
LYMPHOCYTES # BLD AUTO: 1.45 K/UL — SIGNIFICANT CHANGE UP (ref 1–3.3)
LYMPHOCYTES # BLD AUTO: 1.55 K/UL — SIGNIFICANT CHANGE UP (ref 1–3.3)
LYMPHOCYTES # BLD AUTO: 10.2 % — LOW (ref 13–44)
LYMPHOCYTES # BLD AUTO: 10.9 % — LOW (ref 13–44)
LYMPHOCYTES # BLD AUTO: 11.1 % — LOW (ref 13–44)
LYMPHOCYTES # BLD AUTO: 11.7 % — LOW (ref 13–44)
LYMPHOCYTES # BLD AUTO: 6.2 % — LOW (ref 13–44)
LYMPHOCYTES # BLD AUTO: 7.1 % — LOW (ref 13–44)
MAGNESIUM SERPL-MCNC: 29.6 MG/DL — CRITICAL HIGH (ref 1.6–2.6)
MAGNESIUM SERPL-MCNC: 6.7 MG/DL — HIGH (ref 1.6–2.6)
MAGNESIUM SERPL-MCNC: 7 MG/DL — HIGH (ref 1.6–2.6)
MAGNESIUM SERPL-MCNC: 8 MG/DL — HIGH (ref 1.6–2.6)
MAGNESIUM SERPL-MCNC: 9.2 MG/DL — CRITICAL HIGH (ref 1.6–2.6)
MANUAL SMEAR VERIFICATION: SIGNIFICANT CHANGE UP
MCHC RBC-ENTMCNC: 31.3 GM/DL — LOW (ref 32–36)
MCHC RBC-ENTMCNC: 31.7 PG — SIGNIFICANT CHANGE UP (ref 27–34)
MCHC RBC-ENTMCNC: 31.8 PG — SIGNIFICANT CHANGE UP (ref 27–34)
MCHC RBC-ENTMCNC: 32 PG — SIGNIFICANT CHANGE UP (ref 27–34)
MCHC RBC-ENTMCNC: 32.3 PG — SIGNIFICANT CHANGE UP (ref 27–34)
MCHC RBC-ENTMCNC: 32.6 PG — SIGNIFICANT CHANGE UP (ref 27–34)
MCHC RBC-ENTMCNC: 33.9 GM/DL — SIGNIFICANT CHANGE UP (ref 32–36)
MCHC RBC-ENTMCNC: 34.1 GM/DL — SIGNIFICANT CHANGE UP (ref 32–36)
MCHC RBC-ENTMCNC: 34.4 GM/DL — SIGNIFICANT CHANGE UP (ref 32–36)
MCHC RBC-ENTMCNC: 34.8 GM/DL — SIGNIFICANT CHANGE UP (ref 32–36)
MCV RBC AUTO: 103.4 FL — HIGH (ref 80–100)
MCV RBC AUTO: 92.1 FL — SIGNIFICANT CHANGE UP (ref 80–100)
MCV RBC AUTO: 93 FL — SIGNIFICANT CHANGE UP (ref 80–100)
MCV RBC AUTO: 93.1 FL — SIGNIFICANT CHANGE UP (ref 80–100)
MCV RBC AUTO: 96.2 FL — SIGNIFICANT CHANGE UP (ref 80–100)
MONOCYTES # BLD AUTO: 0.08 K/UL — SIGNIFICANT CHANGE UP (ref 0–0.9)
MONOCYTES # BLD AUTO: 0.1 K/UL — SIGNIFICANT CHANGE UP (ref 0–0.9)
MONOCYTES # BLD AUTO: 0.14 K/UL — SIGNIFICANT CHANGE UP (ref 0–0.9)
MONOCYTES # BLD AUTO: 0.16 K/UL — SIGNIFICANT CHANGE UP (ref 0–0.9)
MONOCYTES # BLD AUTO: 0.26 K/UL — SIGNIFICANT CHANGE UP (ref 0–0.9)
MONOCYTES # BLD AUTO: 0.82 K/UL — SIGNIFICANT CHANGE UP (ref 0–0.9)
MONOCYTES NFR BLD AUTO: 0.6 % — LOW (ref 2–14)
MONOCYTES NFR BLD AUTO: 0.9 % — LOW (ref 2–14)
MONOCYTES NFR BLD AUTO: 1 % — LOW (ref 2–14)
MONOCYTES NFR BLD AUTO: 1.7 % — LOW (ref 2–14)
MONOCYTES NFR BLD AUTO: 2 % — SIGNIFICANT CHANGE UP (ref 2–14)
MONOCYTES NFR BLD AUTO: 6.2 % — SIGNIFICANT CHANGE UP (ref 2–14)
NEUTROPHILS # BLD AUTO: 10.8 K/UL — HIGH (ref 1.8–7.4)
NEUTROPHILS # BLD AUTO: 11.42 K/UL — HIGH (ref 1.8–7.4)
NEUTROPHILS # BLD AUTO: 14.14 K/UL — HIGH (ref 1.8–7.4)
NEUTROPHILS # BLD AUTO: 15.34 K/UL — HIGH (ref 1.8–7.4)
NEUTROPHILS # BLD AUTO: 6.91 K/UL — SIGNIFICANT CHANGE UP (ref 1.8–7.4)
NEUTROPHILS # BLD AUTO: 8.3 K/UL — HIGH (ref 1.8–7.4)
NEUTROPHILS NFR BLD AUTO: 81.1 % — HIGH (ref 43–77)
NEUTROPHILS NFR BLD AUTO: 85.8 % — HIGH (ref 43–77)
NEUTROPHILS NFR BLD AUTO: 86.2 % — HIGH (ref 43–77)
NEUTROPHILS NFR BLD AUTO: 87.6 % — HIGH (ref 43–77)
NEUTROPHILS NFR BLD AUTO: 92 % — HIGH (ref 43–77)
NITRITE UR-MCNC: NEGATIVE — SIGNIFICANT CHANGE UP
NRBC # BLD: 0 /100 WBCS — SIGNIFICANT CHANGE UP (ref 0–0)
OSMOLALITY SERPL: 285 MOSMOL/KG — SIGNIFICANT CHANGE UP (ref 275–300)
OSMOLALITY UR: 507 MOS/KG — SIGNIFICANT CHANGE UP (ref 300–900)
OVALOCYTES BLD QL SMEAR: SLIGHT — SIGNIFICANT CHANGE UP
PH UR: 5 — SIGNIFICANT CHANGE UP (ref 5–8)
PLAT MORPH BLD: NORMAL — SIGNIFICANT CHANGE UP
PLATELET # BLD AUTO: 114 K/UL — LOW (ref 150–400)
PLATELET # BLD AUTO: 129 K/UL — LOW (ref 150–400)
PLATELET # BLD AUTO: 199 K/UL — SIGNIFICANT CHANGE UP (ref 150–400)
PLATELET # BLD AUTO: 209 K/UL — SIGNIFICANT CHANGE UP (ref 150–400)
PLATELET # BLD AUTO: 210 K/UL — SIGNIFICANT CHANGE UP (ref 150–400)
PLATELET # BLD AUTO: 215 K/UL — SIGNIFICANT CHANGE UP (ref 150–400)
POIKILOCYTOSIS BLD QL AUTO: SLIGHT — SIGNIFICANT CHANGE UP
POLYCHROMASIA BLD QL SMEAR: SLIGHT — SIGNIFICANT CHANGE UP
POTASSIUM SERPL-MCNC: 4.8 MMOL/L — SIGNIFICANT CHANGE UP (ref 3.5–5.3)
POTASSIUM SERPL-MCNC: 4.9 MMOL/L — SIGNIFICANT CHANGE UP (ref 3.5–5.3)
POTASSIUM SERPL-MCNC: 5 MMOL/L — SIGNIFICANT CHANGE UP (ref 3.5–5.3)
POTASSIUM SERPL-MCNC: 5.4 MMOL/L — HIGH (ref 3.5–5.3)
POTASSIUM SERPL-SCNC: 4.8 MMOL/L — SIGNIFICANT CHANGE UP (ref 3.5–5.3)
POTASSIUM SERPL-SCNC: 4.9 MMOL/L — SIGNIFICANT CHANGE UP (ref 3.5–5.3)
POTASSIUM SERPL-SCNC: 5 MMOL/L — SIGNIFICANT CHANGE UP (ref 3.5–5.3)
POTASSIUM SERPL-SCNC: 5.4 MMOL/L — HIGH (ref 3.5–5.3)
PROT ?TM UR-MCNC: 126 MG/DL — HIGH (ref 0–12)
PROT SERPL-MCNC: 5 G/DL — LOW (ref 6–8.3)
PROT SERPL-MCNC: 5.1 G/DL — LOW (ref 6–8.3)
PROT SERPL-MCNC: 5.4 G/DL — LOW (ref 6–8.3)
PROT SERPL-MCNC: 5.7 G/DL — LOW (ref 6–8.3)
PROT SERPL-MCNC: 5.9 G/DL — LOW (ref 6–8.3)
PROT SERPL-MCNC: 6.4 G/DL — SIGNIFICANT CHANGE UP (ref 6–8.3)
PROT UR-MCNC: 100 MG/DL
PROT/CREAT UR-RTO: 1.2 RATIO — HIGH (ref 0–0.2)
PROTHROM AB SERPL-ACNC: 8.8 SEC — LOW (ref 9.5–13)
PROTHROM AB SERPL-ACNC: 9.1 SEC — LOW (ref 9.5–13)
PROTHROM AB SERPL-ACNC: 9.3 SEC — LOW (ref 9.5–13)
RBC # BLD: 2.32 M/UL — LOW (ref 3.8–5.2)
RBC # BLD: 3.41 M/UL — LOW (ref 3.8–5.2)
RBC # BLD: 3.66 M/UL — LOW (ref 3.8–5.2)
RBC # BLD: 3.71 M/UL — LOW (ref 3.8–5.2)
RBC # BLD: 3.81 M/UL — SIGNIFICANT CHANGE UP (ref 3.8–5.2)
RBC # BLD: 4.06 M/UL — SIGNIFICANT CHANGE UP (ref 3.8–5.2)
RBC # FLD: 12.4 % — SIGNIFICANT CHANGE UP (ref 10.3–14.5)
RBC # FLD: 12.5 % — SIGNIFICANT CHANGE UP (ref 10.3–14.5)
RBC # FLD: 12.6 % — SIGNIFICANT CHANGE UP (ref 10.3–14.5)
RBC # FLD: 12.7 % — SIGNIFICANT CHANGE UP (ref 10.3–14.5)
RBC BLD AUTO: ABNORMAL
RBC CASTS # UR COMP ASSIST: 3 /HPF — SIGNIFICANT CHANGE UP (ref 0–4)
REVIEW: SIGNIFICANT CHANGE UP
SODIUM SERPL-SCNC: 124 MMOL/L — LOW (ref 135–145)
SODIUM SERPL-SCNC: 130 MMOL/L — LOW (ref 135–145)
SODIUM SERPL-SCNC: 131 MMOL/L — LOW (ref 135–145)
SODIUM SERPL-SCNC: 132 MMOL/L — LOW (ref 135–145)
SODIUM SERPL-SCNC: 134 MMOL/L — LOW (ref 135–145)
SODIUM UR-SCNC: 8 MMOL/L — SIGNIFICANT CHANGE UP
SP GR SPEC: 1.01 — SIGNIFICANT CHANGE UP (ref 1–1.03)
SQUAMOUS # UR AUTO: 1 /HPF — SIGNIFICANT CHANGE UP (ref 0–5)
URATE SERPL-MCNC: 6.9 MG/DL — SIGNIFICANT CHANGE UP (ref 2.5–7)
URATE SERPL-MCNC: 7.3 MG/DL — HIGH (ref 2.5–7)
URATE SERPL-MCNC: 7.7 MG/DL — HIGH (ref 2.5–7)
URATE SERPL-MCNC: 7.9 MG/DL — HIGH (ref 2.5–7)
URATE SERPL-MCNC: 8.1 MG/DL — HIGH (ref 2.5–7)
URATE SERPL-MCNC: 8.3 MG/DL — HIGH (ref 2.5–7)
UROBILINOGEN FLD QL: 0.2 MG/DL — SIGNIFICANT CHANGE UP (ref 0.2–1)
WBC # BLD: 13.3 K/UL — HIGH (ref 3.8–10.5)
WBC # BLD: 15.37 K/UL — HIGH (ref 3.8–10.5)
WBC # BLD: 16.67 K/UL — HIGH (ref 3.8–10.5)
WBC # BLD: 7.88 K/UL — SIGNIFICANT CHANGE UP (ref 3.8–10.5)
WBC # BLD: 9.63 K/UL — SIGNIFICANT CHANGE UP (ref 3.8–10.5)
WBC # FLD AUTO: 13.3 K/UL — HIGH (ref 3.8–10.5)
WBC # FLD AUTO: 15.37 K/UL — HIGH (ref 3.8–10.5)
WBC # FLD AUTO: 16.67 K/UL — HIGH (ref 3.8–10.5)
WBC # FLD AUTO: 7.88 K/UL — SIGNIFICANT CHANGE UP (ref 3.8–10.5)
WBC # FLD AUTO: 9.63 K/UL — SIGNIFICANT CHANGE UP (ref 3.8–10.5)
WBC UR QL: 1 /HPF — SIGNIFICANT CHANGE UP (ref 0–5)

## 2023-11-18 PROCEDURE — 99233 SBSQ HOSP IP/OBS HIGH 50: CPT

## 2023-11-18 PROCEDURE — 93010 ELECTROCARDIOGRAM REPORT: CPT

## 2023-11-18 PROCEDURE — 71045 X-RAY EXAM CHEST 1 VIEW: CPT | Mod: 26

## 2023-11-18 RX ORDER — LEVOTHYROXINE SODIUM 125 MCG
50 TABLET ORAL DAILY
Refills: 0 | Status: DISCONTINUED | OUTPATIENT
Start: 2023-11-18 | End: 2023-11-23

## 2023-11-18 RX ORDER — MAGNESIUM SULFATE 500 MG/ML
1 VIAL (ML) INJECTION
Qty: 40 | Refills: 0 | Status: DISCONTINUED | OUTPATIENT
Start: 2023-11-18 | End: 2023-11-19

## 2023-11-18 RX ORDER — MAGNESIUM SULFATE 500 MG/ML
4 VIAL (ML) INJECTION ONCE
Refills: 0 | Status: DISCONTINUED | OUTPATIENT
Start: 2023-11-18 | End: 2023-11-19

## 2023-11-18 RX ORDER — HEPARIN SODIUM 5000 [USP'U]/ML
5000 INJECTION INTRAVENOUS; SUBCUTANEOUS EVERY 8 HOURS
Refills: 0 | Status: DISCONTINUED | OUTPATIENT
Start: 2023-11-18 | End: 2023-11-20

## 2023-11-18 RX ORDER — MAGNESIUM SULFATE 500 MG/ML
2 VIAL (ML) INJECTION
Qty: 40 | Refills: 0 | Status: DISCONTINUED | OUTPATIENT
Start: 2023-11-18 | End: 2023-11-19

## 2023-11-18 RX ORDER — DIPHENHYDRAMINE HCL 50 MG
25 CAPSULE ORAL EVERY 4 HOURS
Refills: 0 | Status: DISCONTINUED | OUTPATIENT
Start: 2023-11-18 | End: 2023-11-19

## 2023-11-18 RX ORDER — ONDANSETRON 8 MG/1
4 TABLET, FILM COATED ORAL EVERY 6 HOURS
Refills: 0 | Status: DISCONTINUED | OUTPATIENT
Start: 2023-11-18 | End: 2023-11-19

## 2023-11-18 RX ORDER — NALOXONE HYDROCHLORIDE 4 MG/.1ML
0.1 SPRAY NASAL
Refills: 0 | Status: DISCONTINUED | OUTPATIENT
Start: 2023-11-18 | End: 2023-11-19

## 2023-11-18 RX ORDER — HYDROMORPHONE HYDROCHLORIDE 2 MG/ML
30 INJECTION INTRAMUSCULAR; INTRAVENOUS; SUBCUTANEOUS
Refills: 0 | Status: DISCONTINUED | OUTPATIENT
Start: 2023-11-18 | End: 2023-11-19

## 2023-11-18 RX ADMIN — HEPARIN SODIUM 5000 UNIT(S): 5000 INJECTION INTRAVENOUS; SUBCUTANEOUS at 13:42

## 2023-11-18 RX ADMIN — Medication 12 MILLIGRAM(S): at 10:49

## 2023-11-18 RX ADMIN — Medication 25 GM/HR: at 13:43

## 2023-11-18 RX ADMIN — Medication 30 MILLIGRAM(S): at 21:29

## 2023-11-18 RX ADMIN — Medication 50 MICROGRAM(S): at 07:15

## 2023-11-18 RX ADMIN — HEPARIN SODIUM 5000 UNIT(S): 5000 INJECTION INTRAVENOUS; SUBCUTANEOUS at 21:47

## 2023-11-18 NOTE — CONSULT NOTE ADULT - ATTENDING COMMENTS
MFM Attending  Pt seen and counseled on morning rounds with r3 Dr. Samson and mfm fellow Dr. Garcia; multiple discussions throughout the day.    Agree with Dr. Garcia's excellent assessment / recommendations above.     at 27 2/7 weeks with new onset severe preeclampsia by blood pressures and labs.   Blood pressure controlled on one agent with room to titrate and EFM reactive without decelerations.  Significant creatinine elevation 0.98 peak -- currently plateau'd at 0.95 -- with nephrotic range proteinuria by urine P:C 8. Adequate UOP.   Low plts and elevated AST / ALT though not quite at threshold for delivery.  Magnesium at 1g / hr due to Cr just below 1; agree with strict ins / outs and frequent lab checks.    Very high suspicion for delivery indicated over next 24 - 48 hours; lab improvement very likely attributed to "steroid holiday" and will very likely worsen over the next 1 - 2 days; watching closely for blood pressures and change in labs, q4-6 hours  Two peripheral IVs in place  Appreciate OB anesthesia consult - plts currently stable > 200   Appreciate NICU consult    Appreciate Nephrology involvement for hyponatremia and nephrotic range proteinuria - depending on postpartum course will assess utility for renal biopsy. Right now picture most consistent with HELLP.    Discussed with referring OB Dr. Harry Ruggiero

## 2023-11-18 NOTE — CHART NOTE - NSCHARTNOTEFT_GEN_A_CORE
2/15/2023    This is a 81 y.o. male   Chief Complaint   Patient presents with    3 Month Follow-Up     Diabetes    . HPI  Pt presents today for a diabetic follow-up. Currently taking no [rescription diabetes medication. Morning glucose readings: Hasn't been checking    Admits to SOB, sees Pulmonology and using a CPAP. Sees Cardiology in 2 days, has carotid artery US tomorrow, Was hospitalized at [facility] 01/26 - 02/01/23, had GI bleed, pneumonia, and had stenting. Admits to some fatigue. Has some periodic mild cough. Denies vision changes or abdominal pain. .    Last Eye Exam: January 1, 2023 - negative for DR  Last Foot Exam:Will do at next visit   Last Microalbumin: Will do at next visit     Today's A1C = 6.7  11/15/22 A1C = 6.9    HTN: Taking Cardura 1 mg, BID Benicar 40 mg, Lasix 40 mg, and Spironolactone 25 mg daily. BP today 134/63. Home BP readings similar to today's readings.      Past Medical History:   Diagnosis Date    Aortic aneurysm, abdominal 1/2011    Appendicitis 12-06-11    Arthritis 12-06-11    Bruises easily     CAD (coronary artery disease) 9/2001    MI     CHF (congestive heart failure) (HCC)     Following CABG    Chronic back pain     Chronic pain of both shoulders 12-06-11    joint - the shoulders hurt    Complication of anesthesia     woke up during appendectomy    Gout     Heart disease 12-06-11    Hernia 12-    HIGH CHOLESTEROL 12-6-11    Hyperlipidemia     Hypertension     Measles 12-    Pancreatitis 2006    History of     Persistent cough     Ringing in ears     Sinus disorder     Sleep deprivation 12-    loss of sleep       Past Surgical History:   Procedure Laterality Date    ABDOMINAL AORTIC ANEURYSM REPAIR      ABDOMINAL AORTIC ANEURYSM REPAIR, ENDOVASCULAR  01/28/2011    Endomvascular abdominal aortic aneurysm repair with insertion of cardiomems pressure sensor    APPENDECTOMY      40-50 years ago    CARDIAC SURGERY  01/01/2008    CABG    CHOLECYSTECTOMY, R3 Plan of Care Note    12pm HELLP/Mg reviewed    HELLP labs stable, Plt returned at 210, consistent with 2a plt count of 215. AST/ALT remain stable at 60/81, Cr slightly uptrended at 0.98.    Patient symptomatically unchanged.    - Plan for repeat labs at 4pm  - Continue NPO, continuous monitoring  - Restart Mg@1g    d/w MFM fellow/service attending Jose XIAO, MFM attending Dr Monik Samson  PGY3 LAPAROSCOPIC  2006    CORONARY ANGIOPLASTY WITH STENT PLACEMENT   and     CORONARY ARTERY BYPASS GRAFT      DIAGNOSTIC CARDIAC CATH LAB PROCEDURE  2023    PCI of distal SVG to [address]      gallbladder/pancreatitis    HERNIA REPAIR      20+ years ago    INTRACAPSULAR CATARACT EXTRACTION Right 2018    PHACOEMULSIFICATION OF CATARACT RIGHT  EYE WITH INTRAOCULAR LENS IMPLANT performed by Jackie Penaloza MD at [address]      panceastitis Viann Corrente    GA XCAPSL CTRC RMVL INSJ IO LENS PROSTH W/O ECP Left 2018    PHACOEMULSIFICATION OF CATARACT LEFT EYE WITH INTRAOCULAR LENS IMPLANT performed by Jackie Penaloza MD at [facility]      as child    UPPER GASTROINTESTINAL ENDOSCOPY N/A 2023    EGD DIAGNOSTIC ONLY performed by Noe Alvares MD at [address] History     Socioeconomic History    Marital status:      Spouse name: Not on file    Number of children: Not on file    Years of education: Not on file    Highest education level: Not on file   Occupational History    Not on file   Tobacco Use    Smoking status: Former     Packs/day: 1.50     Years: 25.00     Pack years: 37.50     Types: Cigarettes     Start date: 1955     Quit date: 1978     Years since quittin.0    Smokeless tobacco: Never   Vaping Use    Vaping Use: Never used   Substance and Sexual Activity    Alcohol use: No     Comment: patient drinks coffee/caffeine 2 cups a day     Drug use: No    Sexual activity: Not Currently     Partners: Female   Other Topics Concern    Not on file   Social History Narrative    Not on file     Social Determinants of Health     Financial Resource Strain: Low Risk     Difficulty of Paying Living Expenses: Not hard at all   Food Insecurity: No Food Insecurity    Worried About Running Out of Food in the Last Year: Never true    Ran Out of Food in the Last Year: Never true   Transportation Needs: Unknown    Lack of Transportation (Medical): Not on file    Lack of Transportation (Non-Medical):  No   Physical Activity: Sufficiently Active    Days of Exercise per Week: 7 days    Minutes of Exercise per Session: 150+ min   Stress: Not on file   Social Connections: Not on file   Intimate Partner Violence: Not on file   Housing Stability: Unknown    Unable to Pay for Housing in the Last Year: Not on file    Number of Places Lived in the Last Year: Not on file    Unstable Housing in the Last Year: No       Family History   Problem Relation Age of Onset    Diabetes Mother     Heart Failure Mother     Cancer Mother     Heart Disease Mother     High Blood Pressure Mother     Heart Surgery Father     Heart Disease Father     Heart Failure Father     Heart Attack Father     Stroke Sister     Stroke Brother     Heart Surgery Paternal Uncle     Heart Disease Paternal Uncle     Heart Disease Paternal Uncle     Stroke Sister        Current Outpatient Medications   Medication Sig Dispense Refill    potassium chloride (KLOR-CON M) 20 MEQ extended release tablet Take 1 tablet by mouth daily 90 tablet 1    clopidogrel (PLAVIX) 75 MG tablet Take 1 tablet by mouth daily 30 tablet 3    pantoprazole (PROTONIX) 40 MG tablet Take 1 tablet by mouth 2 times daily (before meals) 30 tablet 3    doxazosin (CARDURA) 1 MG tablet Take 1 tablet by mouth 2 times daily 60 tablet 2    furosemide (LASIX) 40 MG tablet TAKE 1 TABLET EVERY DAY 90 tablet 1    spironolactone (ALDACTONE) 25 MG tablet TAKE 1 TABLET EVERY DAY 90 tablet 1    atorvastatin (LIPITOR) 40 MG tablet TAKE 1 TABLET EVERY NIGHT 90 tablet 3    olmesartan (BENICAR) 40 MG tablet Take 1 tablet by mouth daily 90 tablet 3    aspirin 81 MG chewable tablet Take 1 tablet by mouth daily 30 tablet 3    ACCU-CHEK MULTICLIX LANCETS MISC Test blood sugar qd (Patient taking differently: Test blood sugar qd    AS NEEDED) 100 each 3    glucose blood VI test strips (ACCU-CHEK ION) strip Test blood sugar qd. 100 strip 3     No current facility-administered medications for this visit. Immunization History   Administered Date(s) Administered    COVID-19, MODERNA BLUE border, Primary or Immunocompromised, (age 12y+), IM, 100 mcg/0.5mL 02/24/2021    COVID-19, MODERNA Bivalent BOOSTER, (age 12y+), IM, 48 mcg/0.5 mL 09/27/2022    COVID-19, US Vaccine, Vaccine Unspecified 01/21/2021, 10/26/2021    Influenza 09/25/2013    Influenza Vaccine, unspecified formulation 11/02/2016    Influenza Whole 09/23/2010    Influenza, FLUAD, (age 72 y+), Adjuvanted, 0.5mL 09/14/2020, 09/13/2021    Influenza, FLUZONE (age 72 y+), High Dose, 0.7mL 09/27/2022    Influenza, High Dose (Fluzone 65 yrs and older) 10/17/2014, 10/08/2017, 09/20/2018    Influenza, Triv, inactivated, subunit, adjuvanted, IM (Fluad 65 yrs and older) 09/20/2018, 09/19/2019    Pneumococcal Conjugate 13-valent ([id number]) 09/20/2018    Pneumococcal Conjugate 7-valent (Prevnar7) 01/26/2007    Pneumococcal Polysaccharide ([id number]) 09/13/2021    Tdap (Boostrix, Adacel) 01/30/2018       Allergies   Allergen Reactions    Coumadin [Warfarin] Other (See Comments)     Makes feel funny    Omeprazole Other (See Comments)     headaches    Vioxx        Hospital Outpatient Visit on 02/07/2023   Component Date Value Ref Range Status    Pro-BNP 02/07/2023 6,589 (A)  0 - 449 pg/mL Final    Comment: Methodology by NT-proBNP    An age-independent cutoff point of 300 pg/ml has a 98%  negative predictive value excluding acute heart failure. Values exceeding the age-related cutoff values (450 pg/mL if  age<50, 900 if 50-75 and 1800 if >75) has 90% sensitivity and  84% specificity for diagnosing acute HF. In patients with  renal compromise (eGFR<60) values greater than 1200pg/ml have  a diagnostic sensitivity and specificity of 89% and 72% for  acute HF.       WBC 02/07/2023 7.9  4.0 - 11.0 K/uL Final    RBC 02/07/2023 3.53 (A)  4.20 - 5.90 M/uL Final    Hemoglobin 02/07/2023 10.5 (A)  13.5 - 17.5 g/dL Final    Hematocrit 02/07/2023 31.7 (A)  40.5 - 52.5 % Final    MCV 02/07/2023 89.9  80.0 - 100.0 fL Final    MCH 02/07/2023 29.7  26.0 - 34.0 pg Final    MCHC 02/07/2023 33.1  31.0 - 36.0 g/dL Final    RDW 02/07/2023 15.2  12.4 - 15.4 % Final    Platelets 07/89/4345 269  135 - 450 K/uL Final    Results confirmed, test repeated    MPV 02/07/2023 8.0  5.0 - 10.5 fL Final    Neutrophils % 02/07/2023 74.7  % Final    Lymphocytes % 02/07/2023 14.7  % Final    Monocytes % 02/07/2023 8.3  % Final    Eosinophils % 02/07/2023 1.1  % Final    Basophils % 02/07/2023 1.2  % Final    Neutrophils Absolute 02/07/2023 5.9  1.7 - 7.7 K/uL Final    Lymphocytes Absolute 02/07/2023 1.2  1.0 - 5.1 K/uL Final    Monocytes Absolute 02/07/2023 0.7  0.0 - 1.3 K/uL Final    Eosinophils Absolute 02/07/2023 0.1  0.0 - 0.6 K/uL Final    Basophils Absolute 02/07/2023 0.1  0.0 - 0.2 K/uL Final    Sodium 02/07/2023 146 (A)  136 - 145 mmol/L Final    Potassium 02/07/2023 3.8  3.5 - 5.1 mmol/L Final    Chloride 02/07/2023 106  99 - 110 mmol/L Final    CO2 02/07/2023 22  21 - 32 mmol/L Final    Anion Gap 02/07/2023 18 (A)  3 - 16 Final    Glucose 02/07/2023 183 (A)  70 - 99 mg/dL Final    BUN 02/07/2023 19  7 - 20 mg/dL Final    Creatinine 02/07/2023 1.1  0.8 - 1.3 mg/dL Final    Est, Glom Filt Rate 02/07/2023 >60  >60 Final    Comment: Pediatric calculator link  [employer].at. org/professionals/kdoqi/gfr_calculatorped  Effective Oct 3, 2022  These results are not intended for use in patients  <25years of age. eGFR results are calculated without  a race factor using the 2021 CKD-EPI equation. Careful  clinical correlation is recommended, particularly when  comparing to results calculated using previous equations.   The CKD-EPI equation is less accurate in patients with  extremes of muscle mass, extra-renal metabolism of  creatinine, excessive creatinine ingestion, or following  therapy that affects renal tubular secretion. Calcium 02/07/2023 9.2  8.3 - 10.6 mg/dL Final       Review of Systems   Constitutional:  Positive for fatigue. Eyes:  Negative for visual disturbance. Respiratory:  Positive for shortness of breath. /63 Comment: home reading  Pulse 65   Temp 97.1 °F (36.2 °C) (Temporal)   Resp 16   Wt 191 lb (86.6 kg)   SpO2 96%   BMI 28.21 kg/m²     Physical Exam  Vitals reviewed. Constitutional:       Appearance: He is well-developed. HENT:      Head: Normocephalic and atraumatic. Eyes:      Pupils: Pupils are equal, round, and reactive to light. Cardiovascular:      Rate and Rhythm: Normal rate. Rhythm irregular. Heart sounds: Normal heart sounds. Comments: Skipped beat  Pulmonary:      Effort: Pulmonary effort is normal.      Breath sounds: Normal breath sounds. No wheezing. Abdominal:      General: Bowel sounds are normal.      Tenderness: There is no abdominal tenderness. Musculoskeletal:      Cervical back: Normal range of motion. Neurological:      Mental Status: He is alert and oriented to person, place, and time. Psychiatric:         Behavior: Behavior normal.         Thought Content: Thought content normal.         Judgment: Judgment normal.       Plan   Diagnosis Orders   1. Type 2 diabetes mellitus without complication, without long-term current use of insulin (HCC)  Stable, A1C = 6.7 today, POCT glycosylated hemoglobin (Hb A1C)      2. Essential hypertension  Stable, continue Cardura 1 mg, BID Benicar 40 mg, Lasix 40 mg, and Spironolactone 25 mg daily      3. Community acquired pneumonia, unspecified laterality  XR CHEST STANDARD (2 VW) in 1 month          Return in about 3 months (around 5/15/2023) for Diabetis F/U. Prior to Visit Medications    Medication Sig Taking?  Authorizing Provider   potassium chloride (KLOR-CON M) 20 MEQ extended release tablet Take 1 tablet by mouth daily Yes Mona Cruz Hum, APRN - CNP   clopidogrel (PLAVIX) 75 MG tablet Take 1 tablet by mouth daily Yes RUPERTO Parson CNP   pantoprazole (PROTONIX) 40 MG tablet Take 1 tablet by mouth 2 times daily (before meals) Yes RUPERTO Moseley CNP   doxazosin (CARDURA) 1 MG tablet Take 1 tablet by mouth 2 times daily Yes RUPERTO Moseley CNP   furosemide (LASIX) 40 MG tablet TAKE 1 TABLET EVERY DAY Yes Luis Bowen DO   spironolactone (ALDACTONE) 25 MG tablet TAKE 1 TABLET EVERY DAY Yes Luis Bowen DO   atorvastatin (LIPITOR) 40 MG tablet TAKE 1 TABLET EVERY NIGHT Yes Mikie Salguero MD   olmesartan (BENICAR) 40 MG tablet Take 1 tablet by mouth daily Yes Mikie Salguero MD   aspirin 81 MG chewable tablet Take 1 tablet by mouth daily Yes RUPERTO Balderrama CNP   ACCU-CHEK MULTICLIX LANCETS MISC Test blood sugar qd  Patient taking differently: Test blood sugar qd    AS NEEDED Yes Luis Bowen DO   glucose blood VI test strips (ACCU-CHEK ION) strip Test blood sugar qd.  Yes Luis Bowen DO   metoprolol succinate (TOPROL XL) 50 MG extended release tablet TAKE 1 TABLET EVERY DAY  Luis Bowen,

## 2023-11-18 NOTE — PROGRESS NOTE ADULT - SUBJECTIVE AND OBJECTIVE BOX
PGY 3 Antepartum Note    Patient seen and examined at bedside in NAD. Denies any CTX, LOF or VB.  Reports good fetal movement. Denies HA, blurry vision, RUQ pain, SOB.     T(F): 98.42 (03:22)  HR: 83 (05:32)  BP: 127/63 (05:22)  RR: 17 (01:24)    EFM: baseline 135/moderate variability with periods of minimal  TOCO: to CTX    Gen: NAD  Abd: gravid, nontender  Ext: no calf tenderness  SVE: deferred    Medications:  betamethasone Injectable: 12 milliGRAM(s) (11-17-23 @ 19:56)  labetalol Injectable: 20 milliGRAM(s) (11-17-23 @ 21:10)  magnesium sulfate  IVPB: 300 mL/Hr (11-17-23 @ 19:10)  magnesium sulfate Infusion: 50 mL/Hr (11-17-23 @ 18:51)  NIFEdipine IR: 10 milliGRAM(s) (11-17-23 @ 18:46)  NIFEdipine IR: 20 milliGRAM(s) (11-17-23 @ 20:37)  NIFEdipine XL: 30 milliGRAM(s) (11-17-23 @ 21:13)      Labs:                        12.9   16.67 )-----------( 215      ( 18 Nov 2023 01:58 )             37.8     11-18    134<L>  |  103  |  19  ----------------------------<  106<H>  4.8   |  16<L>  |  0.91    Ca    8.7      18 Nov 2023 00:43  Mg     6.7     11-18    TPro  6.4  /  Alb  3.2<L>  /  TBili  0.3  /  DBili  x   /  AST  74<H>  /  ALT  83<H>  /  AlkPhos  190<H>  11-18      Urinalysis Basic - ( 18 Nov 2023 00:43 )    Color: x / Appearance: x / SG: x / pH: x  Gluc: 106 mg/dL / Ketone: x  / Bili: x / Urobili: x   Blood: x / Protein: x / Nitrite: x   Leuk Esterase: x / RBC: x / WBC x   Sq Epi: x / Non Sq Epi: x / Bacteria: x

## 2023-11-18 NOTE — CONSULT NOTE ADULT - TIME BILLING
Thank you for requesting a MFM consultation on this patient for the entities listed above. The total time spent in preparation for this visit, medical history taking, orders, review of records, counseling the patient and the family member and writing the note was 60 minutes.

## 2023-11-18 NOTE — PROGRESS NOTE ADULT - ASSESSMENT
33y  at 27+2 admitted for management of severe preeclampsia, met criteria via severe range BPs, now with rising transaminases.   - BP monitoring  - c/w Mg   - s/p Proc 10/20, Lab 20 ()   - c/w Pro 30XL  - 2nd dose of BMZ due  at 8p  - NPO  - Continuous EFM   - f/u GBS   - f/u AM HELLP labs    -NICU consult    EVERTON Bunch, PGY3

## 2023-11-18 NOTE — CONSULT NOTE PEDS - SUBJECTIVE AND OBJECTIVE BOX
Ms. Guerrero is a 31 y/o  admitted at 27w 1d gestational age. Maternal history noncontributory. Prenatal history notable for low PAPPA with low risk NPIs, s/p amnio on , and resealed previable PPROM at 16 WGA s/p amnio. Admitted for elevated blood pressures and found to have pre-eclampsia with severe features. S/p BMZ x1. S/p Mag infusion. Most recent EFW 907g on . NICU consulted to discuss what to expect if she were to deliver at 23 weeks gestation.    I met with Ms. Guerrero and her partner and we reviewed the followin. The NICU team will be present at her delivery and will immediately assess and care for her infant.    2. Due to lung immaturity, the infant will likely require respiratory support. This can be in the form of CPAP or intubation with mechanical ventilation. The outcomes improve after  steroids.    3. Depending on the clinical status of the infant, enteral feedings may not be started immediately. IV nutrition in the form of TPN would be provided via umbilical line or PICC until the infant is able to tolerate full enteral feedings. Due to immature suck/swallow, the infant will require an orogastric or nasogastric tube once feeds are initiated. There will be a slow transition to enteral feedings. The infant is also at risk for hypoglycemia.    4. Discussed the benefits of breastfeeding in  infants and encouraged mother to pump following delivery.    5. Due to prematurity, the infant will be at increased risk for infection and would likely be started on antibiotics after birth. The infant will be screened for infections following delivery and may require other courses of antibiotics during their hospital course if an infection were suspected. If the infant shows signs and symptoms of feeding intolerance, feedings may be held, and the infant may require evaluation for intestinal infection (necrotizing enterocolitis).    6. Risk of symptomatic PDA discussed with possible need for medical vs procedural closure.    7. Risk of intraventricular hemorrhage (IVH) and possible consequences discussed.    8. Retinopathy of prematurity (ROP) risk discussed along with close follow up with ophthalmologist. If ROP is significant, medical or surgical treatment may be required.    9. The infant is at risk for developmental delays as a consequence of prematurity. The infant will be evaluated by a developmental pediatrician to monitor for neurodevelopmental delays.    10. Thermoregulation issues and need to be in an isolette with slow weaning to a crib discussed.    11. Elevated risk of jaundice and possible requirement for phototherapy discussed.    12. Length of stay is highly variable, but given the infant’s gestational age, average stay is approximately 10 weeks. Discussed discharged criteria.    Ms. Guerrero had the chance to ask any questions and was encouraged to contact the NICU at that time if additional questions arise.    Thank you for the opportunity to participate in the care of this patient and please inform us of any changes in her status. Ms. Guerrero is a 31 y/o  admitted at 27w 1d gestational age. Maternal history noncontributory. Prenatal history notable for low PAPPA with low risk NIPs, s/p amnio on , and resealed previable PPROM at 16 WGA s/p amnio. Admitted for elevated blood pressures and found to have pre-eclampsia with severe features. S/p BMZ x1. S/p Mag infusion. Most recent EFW 907g on . NICU consulted to discuss what to expect if she were to deliver at 23 weeks gestation.    I met with Ms. Guerrero and her partner and we reviewed the followin. The NICU team will be present at her delivery and will immediately assess and care for her infant.    2. Due to lung immaturity, the infant will likely require respiratory support. This can be in the form of CPAP or intubation with mechanical ventilation. The outcomes improve after  steroids.    3. Depending on the clinical status of the infant, enteral feedings may not be started immediately. IV nutrition in the form of TPN would be provided via umbilical line or PICC until the infant is able to tolerate full enteral feedings. Due to immature suck/swallow, the infant will require an orogastric or nasogastric tube once feeds are initiated. There will be a slow transition to enteral feedings. The infant is also at risk for hypoglycemia.    4. Discussed the benefits of breastfeeding in  infants and encouraged mother to pump following delivery.    5. Due to prematurity, the infant will be at increased risk for infection and would likely be started on antibiotics after birth. The infant will be screened for infections following delivery and may require other courses of antibiotics during their hospital course if an infection were suspected. If the infant shows signs and symptoms of feeding intolerance, feedings may be held, and the infant may require evaluation for intestinal infection (necrotizing enterocolitis).    6. Risk of symptomatic PDA discussed with possible need for medical vs procedural closure.    7. Risk of intraventricular hemorrhage (IVH) and possible consequences discussed.    8. Retinopathy of prematurity (ROP) risk discussed along with close follow up with ophthalmologist. If ROP is significant, medical or surgical treatment may be required.    9. The infant is at risk for developmental delays as a consequence of prematurity. The infant will be evaluated by a developmental pediatrician to monitor for neurodevelopmental delays.    10. Thermoregulation issues and need to be in an isolette with slow weaning to a crib discussed.    11. Elevated risk of jaundice and possible requirement for phototherapy discussed.    12. Length of stay is highly variable, but given the infant’s gestational age, average stay is approximately 10 weeks. Discussed discharged criteria.    Ms. Guerrero had the chance to ask any questions and was encouraged to contact the NICU at that time if additional questions arise.    Thank you for the opportunity to participate in the care of this patient and please inform us of any changes in her status. Ms. Guerrero is a 33 y/o  admitted at 27w 1d gestational age. Maternal history noncontributory. Prenatal history notable for low PAPPA with low risk NIPs, s/p amnio on , and resealed previable PPROM at 16 WGA s/p amnio. Admitted for elevated blood pressures and found to have pre-eclampsia with severe features. S/p BMZ x1. S/p Mag infusion. Most recent EFW 907g on . NICU consulted to discuss what to expect if she were to deliver at 23 weeks gestation.    I met with Ms. Guerrero and her partner and we reviewed the followin. The NICU team will be present at her delivery and will immediately assess and care for her infant.    2. Due to lung immaturity, the infant will likely require respiratory support. This can be in the form of CPAP or intubation with mechanical ventilation. The outcomes improve after  steroids.    3. Depending on the clinical status of the infant, enteral feedings may not be started immediately. IV nutrition in the form of TPN would be provided via umbilical line or PICC until the infant is able to tolerate full enteral feedings. Due to immature suck/swallow, the infant will require an orogastric or nasogastric tube once feeds are initiated. There will be a slow transition to enteral feedings. The infant is also at risk for hypoglycemia.    4. Discussed the benefits of breastfeeding in  infants and encouraged mother to pump following delivery.    5. Due to prematurity, the infant will be at increased risk for infection and would likely be started on antibiotics after birth. The infant will be screened for infections following delivery and may require other courses of antibiotics during their hospital course if an infection were suspected. If the infant shows signs and symptoms of feeding intolerance, feedings may be held, and the infant may require evaluation for intestinal infection (necrotizing enterocolitis).    6. Risk of symptomatic PDA discussed with possible need for medical vs procedural closure.    7. Risk of intraventricular hemorrhage (IVH) and possible consequences discussed.    8. Retinopathy of prematurity (ROP) risk discussed along with close follow up with ophthalmologist. If ROP is significant, medical or surgical treatment may be required.    9. The infant is at risk for developmental delays as a consequence of prematurity. The infant will be evaluated by a developmental pediatrician to monitor for neurodevelopmental delays.    10. Thermoregulation issues and need to be in an isolette with slow weaning to a crib discussed.    11. Elevated risk of jaundice and possible requirement for phototherapy discussed.    12. Length of stay is highly variable, but given the infant’s gestational age, average stay is approximately 10 weeks. Discussed discharged criteria.    Ms. Guerrero had the chance to ask any questions and was encouraged to contact the NICU at that time if additional questions arise.    Thank you for the opportunity to participate in the care of this patient and please inform us of any changes in her status. Ms. Guerrero is a 31 y/o  admitted at 27w 1d gestational age. Maternal history noncontributory. Prenatal history notable for low PAPPA with low risk NIPs, s/p amnio on , and resealed previable PPROM at 16 WGA s/p amnio. Admitted for elevated blood pressures and found to have pre-eclampsia with severe features. S/p BMZ x2 doses. S/p Mag infusion. Most recent EFW 907g on . NICU consulted to discuss what to expect if she were to deliver at 23 weeks gestation.    I met with Ms. Guerrero and her partner and we reviewed the followin. The NICU team will be present at her delivery and will immediately assess and care for her infant.    2. Due to lung immaturity, the infant will likely require respiratory support. This can be in the form of CPAP or intubation with mechanical ventilation. The outcomes improve after  steroids.    3. Depending on the clinical status of the infant, enteral feedings may not be started immediately. IV nutrition in the form of TPN would be provided via umbilical line or PICC until the infant is able to tolerate full enteral feedings. Due to immature suck/swallow, the infant will require an orogastric or nasogastric tube once feeds are initiated. There will be a slow transition to enteral feedings. The infant is also at risk for hypoglycemia.    4. Discussed the benefits of breastfeeding in  infants and encouraged mother to pump following delivery.    5. Due to prematurity, the infant will be at increased risk for infection and would likely be started on antibiotics after birth. The infant will be screened for infections following delivery and may require other courses of antibiotics during their hospital course if an infection were suspected. If the infant shows signs and symptoms of feeding intolerance, feedings may be held, and the infant may require evaluation for intestinal infection (necrotizing enterocolitis).    6. Risk of symptomatic PDA discussed with possible need for medical vs procedural closure.    7. Risk of intraventricular hemorrhage (IVH) and possible consequences discussed.    8. Retinopathy of prematurity (ROP) risk discussed along with close follow up with ophthalmologist. If ROP is significant, medical or surgical treatment may be required.    9. The infant is at risk for developmental delays as a consequence of prematurity. The infant will be evaluated by a developmental pediatrician to monitor for neurodevelopmental delays.    10. Thermoregulation issues and need to be in an isolette with slow weaning to a crib discussed.    11. Elevated risk of jaundice and possible requirement for phototherapy discussed.    12. Length of stay is highly variable, but given the infant’s gestational age, average stay is approximately 10 weeks. Discussed discharged criteria.    Ms. Guerrero had the chance to ask any questions and was encouraged to contact the NICU at that time if additional questions arise.    Thank you for the opportunity to participate in the care of this patient and please inform us of any changes in her status. Ms. Guerrero is a 33 y/o  admitted at 27w 1d gestational age. Maternal history noncontributory. Prenatal history notable for low PAPPA with low risk NIPs, s/p amnio on , and resealed previable PPROM at 16 WGA s/p amnio. Admitted for elevated blood pressures and found to have pre-eclampsia with severe features. S/p BMZ x2 doses. S/p Mag infusion. Most recent EFW 907g on . NICU consulted to discuss what to expect if she were to deliver at 23 weeks gestation.    I met with Ms. Guerrero and her partner and we reviewed the followin. The NICU team will be present at her delivery and will immediately assess and care for her infant.    2. Due to lung immaturity, the infant will likely require respiratory support. This can be in the form of CPAP or intubation with mechanical ventilation. The outcomes improve after  steroids.    3. Depending on the clinical status of the infant, enteral feedings may not be started immediately. IV nutrition in the form of TPN would be provided via umbilical line or PICC until the infant is able to tolerate full enteral feedings. Due to immature suck/swallow, the infant will require an orogastric or nasogastric tube once feeds are initiated. There will be a slow transition to enteral feedings. The infant is also at risk for hypoglycemia.    4. Discussed the benefits of breastfeeding in  infants and encouraged mother to pump following delivery.    5. Due to prematurity, the infant will be at increased risk for infection and would likely be started on antibiotics after birth. The infant will be screened for infections following delivery and may require other courses of antibiotics during their hospital course if an infection were suspected. If the infant shows signs and symptoms of feeding intolerance, feedings may be held, and the infant may require evaluation for intestinal infection (necrotizing enterocolitis).    6. Risk of symptomatic PDA discussed with possible need for medical vs procedural closure.    7. Risk of intraventricular hemorrhage (IVH) and possible consequences discussed.    8. Retinopathy of prematurity (ROP) risk discussed along with close follow up with ophthalmologist. If ROP is significant, medical or surgical treatment may be required.    9. The infant is at risk for developmental delays as a consequence of prematurity. The infant will be evaluated by a developmental pediatrician to monitor for neurodevelopmental delays.    10. Thermoregulation issues and need to be in an isolette with slow weaning to a crib discussed.    11. Elevated risk of jaundice and possible requirement for phototherapy discussed.    12. Length of stay is highly variable, but given the infant’s gestational age, average stay is approximately 10 weeks. Discussed discharged criteria.    Ms. Guerrero had the chance to ask any questions and was encouraged to contact the NICU at that time if additional questions arise.    Thank you for the opportunity to participate in the care of this patient and please inform us of any changes in her status. Ms. Guerrero is a 33 y/o  admitted at 27w 1d gestational age. Maternal history noncontributory. Prenatal history notable for low PAPPA with low risk NIPs, s/p amnio on , and resealed previable PPROM at 16 WGA s/p amnio. Admitted for elevated blood pressures and found to have pre-eclampsia with severe features. S/p BMZ x2 doses. S/p Mag infusion, most recent serum level is 8.0. Most recent EFW 907g on . NICU consulted to discuss what to expect if she were to deliver at 27 weeks gestation.    I met with Ms. Guerrero and her partner and we reviewed the followin. The NICU team will be present at her delivery and will immediately assess and care for her infant.    2. Due to lung immaturity, the infant will likely require respiratory support. This can be in the form of CPAP or intubation with mechanical ventilation. The outcomes improve after  steroids.    3. Depending on the clinical status of the infant, enteral feedings may not be started immediately. IV nutrition in the form of TPN would be provided via umbilical line or PICC until the infant is able to tolerate full enteral feedings. Due to immature suck/swallow, the infant will require an orogastric or nasogastric tube once feeds are initiated. There will be a slow transition to enteral feedings. The infant is also at risk for hypoglycemia.    4. Discussed the benefits of breastfeeding in  infants and encouraged mother to pump following delivery.    5. Due to prematurity, the infant will be at increased risk for infection and would likely be started on antibiotics after birth. The infant will be screened for infections following delivery and may require other courses of antibiotics during their hospital course if an infection were suspected. If the infant shows signs and symptoms of feeding intolerance, feedings may be held, and the infant may require evaluation for intestinal infection (necrotizing enterocolitis).    6. Risk of symptomatic PDA discussed with possible need for medical vs procedural closure.    7. Risk of intraventricular hemorrhage (IVH) and possible consequences discussed.    8. Retinopathy of prematurity (ROP) risk discussed along with close follow up with ophthalmologist. If ROP is significant, medical or surgical treatment may be required.    9. The infant is at risk for developmental delays as a consequence of prematurity. The infant will be evaluated by a developmental pediatrician to monitor for neurodevelopmental delays.    10. Thermoregulation issues and need to be in an isolette with slow weaning to a crib discussed.    11. Elevated risk of jaundice and possible requirement for phototherapy discussed.    12. Length of stay is highly variable, but given the infant’s gestational age, average stay is approximately 10 weeks. Discussed discharged criteria.    Ms. Guerrero had the chance to ask any questions and was encouraged to contact the NICU at that time if additional questions arise.    Thank you for the opportunity to participate in the care of this patient and please inform us of any changes in her status. Ms. Guerrero is a 31 y/o  admitted at 27w 1d gestational age. Maternal history noncontributory. Prenatal history notable for low PAPPA with low risk NIPs, s/p amnio on , and resealed previable PPROM at 16 WGA s/p amnio. Admitted for elevated blood pressures and found to have pre-eclampsia with severe features. S/p BMZ x2 doses. S/p Mag infusion, most recent serum level is 8.0. Most recent EFW 907g on . NICU consulted to discuss what to expect if she were to deliver at 27 weeks gestation.    I met with Ms. Guerrero and her partner and we reviewed the followin. The NICU team will be present at her delivery and will immediately assess and care for her infant.    2. Due to lung immaturity, the infant will likely require respiratory support. This can be in the form of CPAP or intubation with mechanical ventilation. The outcomes improve after  steroids.    3. Depending on the clinical status of the infant, enteral feedings may not be started immediately. IV nutrition in the form of TPN would be provided via umbilical line or PICC until the infant is able to tolerate full enteral feedings. Due to immature suck/swallow, the infant will require an orogastric or nasogastric tube once feeds are initiated. There will be a slow transition to enteral feedings. The infant is also at risk for hypoglycemia.    4. Discussed the benefits of breastfeeding in  infants and encouraged mother to pump following delivery.    5. Due to prematurity, the infant will be at increased risk for infection and would likely be started on antibiotics after birth. The infant will be screened for infections following delivery and may require other courses of antibiotics during their hospital course if an infection were suspected. If the infant shows signs and symptoms of feeding intolerance, feedings may be held, and the infant may require evaluation for intestinal infection (necrotizing enterocolitis).    6. Risk of symptomatic PDA discussed with possible need for medical vs procedural closure.    7. Risk of intraventricular hemorrhage (IVH) and possible consequences discussed.    8. Retinopathy of prematurity (ROP) risk discussed along with close follow up with ophthalmologist. If ROP is significant, medical or surgical treatment may be required.    9. The infant is at risk for developmental delays as a consequence of prematurity. The infant will be evaluated by a developmental pediatrician to monitor for neurodevelopmental delays.    10. Thermoregulation issues and need to be in an isolette with slow weaning to a crib discussed.    11. Elevated risk of jaundice and possible requirement for phototherapy discussed.    12. Length of stay is highly variable, but given the infant’s gestational age, average stay is approximately 10 weeks. Discussed discharged criteria.    Ms. Guerrero had the chance to ask any questions and was encouraged to contact the NICU at that time if additional questions arise.    Thank you for the opportunity to participate in the care of this patient and please inform us of any changes in her status.

## 2023-11-18 NOTE — CHART NOTE - NSCHARTNOTEFT_GEN_A_CORE
R3 Eval Note    Patient evaluated at bedside for 830 Mg level of 29. Patient mentating well, with 2+ DTR's, lungs clear to auscultation bilaterally, not clinically suspicious for Mg toxicity.    Vital Signs  T(C): 36.9 (18 Nov 2023 09:37), Max: 36.9 (17 Nov 2023 19:24)  T(F): 98.42 (18 Nov 2023 09:37), Max: 98.42 (17 Nov 2023 21:35)  HR: 90 (18 Nov 2023 11:57) (65 - 130)  BP: 131/70 (18 Nov 2023 11:52) (107/59 - 184/107)  RR: 17 (18 Nov 2023 05:22) (17 - 18)  SpO2: 95% (18 Nov 2023 11:57) (91% - 100%)    - Stat labs to repeat Mg  - EKG normal sinus rhythm    d/w MFM fellow Jose XIAO, MFM attending Dr Ruggiero, OB attending Dr Harry Samson  PGY3

## 2023-11-18 NOTE — CONSULT NOTE ADULT - ASSESSMENT
Assessment: Patient is a 34 YO  at 27w2d with preeclampsia with severe features by sustained severe range blood pressures and lab abnormalities. Patient asymptomatic. Patient has normal to mild range blood pressures on Nifedipine 30 mg XL daily. Overall patient with worsening labs, although AM labs (7 AM/8:30 AM) do appear spurious (such as acute hyponatremia 124 and hypermagnesemia of 29)  and potentially drawn proximal to intravenous line. On evaluation, patient mentating normally, 2+ DTRs, and normal EKG. Clinical evaluation does not correlate with laboratory finding. Labs notable for initially dropping platelets (215>129), although subsequently 210 on most recent CBC, rising creatinine 0.98 (baseline 0.55), and stably elevated LFTs (AST/ALT 60/81), concerning for evolving HELLP. Lab abnormalities not yet meeting criteria for delivery. Fetal monitoring reassuring with moderate variability and presence of accelerations.    Reviewed diagnosis of preeclampsia with severe features with patient and partner. Reviewed concerns regarding trend in lab abnormalities and possible need for delivery today for worsening maternal status. Additionally reviewed role of magnesium for maternal seizure prophylaxis and fetal neuroprotection and betamethasone for fetal lung maturity.     Recommendations:  -Serial preeclampsia labs, next 4 PM  -CXR given oxygen saturation of 92-94%  -Magnesium gtt restarted at 1 g/hr given Cr 0.98 and magnesium level of 8.0 after magnesium was held for about 4 hours  -Strict I/O monitoring, mack catheter placed  -Anesthesia aware, will consider epidural placement if platelets start to downtrend  -NPO in event of delivery  -Patient with nephrotic range proteinuria, recommend initiation of heparin 5000 U subcutaneous q8h. Patient will need prophylactic anticoagulation through six weeks postpartum  -Nephrology consultation in setting of hyponatremia and nephrotic range proteinuria  -S/p BMZ x2, second dose of betamethasone expedited given concern for worsening laboratory findings  -S/p NICU consult  -Will need to check APLS testing if patient delivers prior to 34 weeks delivery  -Recommend inpatient cardio obstetrics evaluation (can be postpartum depending on when delivery is indicated)    Jaclyn Garcia MD  MFM Fellow

## 2023-11-18 NOTE — PRE-ANESTHESIA EVALUATION ADULT - NSANTHADDINFOFT_GEN_ALL_CORE
Given heparin at 10p. Discussed with OB and pt, agreed to move forward with GA despite inadequate NPO time

## 2023-11-18 NOTE — CHART NOTE - NSCHARTNOTEFT_GEN_A_CORE
[facility] Brief Event Note:    Patient is a 34 YO  at 27w2d with preeclampsia with severe features by sustained severe range blood pressures and lab abnormalities. On serial preeclampsia labs, patient has had worsening of her creatinine, most recently 1.19. Given creatinine > 1.1, recommend proceeding with delivery at this time.    -Proceed with delivery at this time, mode per primary obstetrician   -Continue serial preeclampsia labs, every 6 hours  -Continue Magnesium gtt at 1 g/hr given kidney dysfunction and follow serial magnesium levels  -Strict I/O monitoring  -Continue magnesium through 24 hours postpartum    Jaclyn Garcia MD  [facility] Fellow [facility] Brief Event Note:    Patient is a 32 YO  at 27w2d with preeclampsia with severe features by sustained severe range blood pressures and lab abnormalities. On serial preeclampsia labs, patient has had worsening of her creatinine, most recently 1.19. Given creatinine > 1.1, recommend proceeding with delivery at this time.    -Proceed with delivery at this time, mode per primary obstetrician   -Continue serial preeclampsia labs, every 6 hours  -Continue Magnesium gtt at 1 g/hr given kidney dysfunction and follow serial magnesium levels  -Strict I/O monitoring  -Continue magnesium through 24 hours postpartum    Jaclyn Garcia MD  [facility] Fellow [facility] Brief Event Note:    Patient is a 32 YO  at 27w2d with preeclampsia with severe features by sustained severe range blood pressures and lab abnormalities. On serial preeclampsia labs, patient has had worsening of her creatinine, most recently 1.19. Given creatinine > 1.1, recommend proceeding with delivery at this time.    -Proceed with delivery at this time, mode per primary obstetrician   -Continue serial preeclampsia labs, every 6 hours  -Continue Magnesium gtt at 1 g/hr given kidney dysfunction and follow serial magnesium levels  -Strict I/O monitoring  -Continue magnesium through 24 hours postpartum    Jaclyn Garcia MD  [facility] Fellow [facility] Brief Event Note:    Patient is a 32 YO  at 27w2d with preeclampsia with severe features by sustained severe range blood pressures and lab abnormalities. On serial preeclampsia labs, patient has had worsening of her creatinine, most recently 1.19. Given creatinine > 1.1, recommend proceeding with delivery at this time.    -Proceed with delivery at this time, mode per primary obstetrician   -Continue serial preeclampsia labs, every 6 hours  -Continue Magnesium gtt at 1 g/hr given kidney dysfunction and follow serial magnesium levels  -Strict I/O monitoring  -Continue magnesium through 24 hours postpartum    Jaclyn Garcia MD  [facility] Fellow    M Attending - discussed above plan with the MFM Fellow and with referring OB Dr. Mcdonald. All in agreement with the need for delivery given worsening maternal renal function in the setting of severe preeclampsia. Agree with above plan - send APLS work up and regardless of result patient needs prophylactic Lovenox x6 weeks postpartum given nephrotic range proteinuria with low albumin, postop status, and postpartum status.     Palak Ruggiero [facility] Brief Event Note:    Patient is a 32 YO  at 27w2d with preeclampsia with severe features by sustained severe range blood pressures and lab abnormalities. On serial preeclampsia labs, patient has had worsening of her creatinine, most recently 1.19. Given creatinine > 1.1, recommend proceeding with delivery at this time.    -Proceed with delivery at this time, mode per primary obstetrician   -Continue serial preeclampsia labs, every 6 hours  -Continue Magnesium gtt at 1 g/hr given kidney dysfunction and follow serial magnesium levels  -Strict I/O monitoring  -Continue magnesium through 24 hours postpartum    Jaclyn Garcia MD  [facility] Fellow    M Attending - discussed above plan with the MFM Fellow and with referring OB Dr. Mcdonald. All in agreement with the need for delivery given worsening maternal renal function in the setting of severe preeclampsia. Agree with above plan - send APLS work up and regardless of result patient needs prophylactic Lovenox x6 weeks postpartum given nephrotic range proteinuria with low albumin, postop status, and postpartum status.     Palak Ruggiero [facility] Brief Event Note:    Patient is a 32 YO  at 27w2d with preeclampsia with severe features by sustained severe range blood pressures and lab abnormalities. On serial preeclampsia labs, patient has had worsening of her creatinine, most recently 1.19. Given creatinine > 1.1, recommend proceeding with delivery at this time.    -Proceed with delivery at this time, mode per primary obstetrician   -Continue serial preeclampsia labs, every 6 hours  -Continue Magnesium gtt at 1 g/hr given kidney dysfunction and follow serial magnesium levels  -Strict I/O monitoring  -Continue magnesium through 24 hours postpartum    Jaclyn Garcia MD  [facility] Fellow    M Attending - discussed above plan with the MFM Fellow and with referring OB Dr. Mcdonald. All in agreement with the need for delivery given worsening maternal renal function in the setting of severe preeclampsia. Agree with above plan - send APLS work up and regardless of result patient needs prophylactic Lovenox x6 weeks postpartum given nephrotic range proteinuria with low albumin, postop status, and postpartum status.     Palak Ruggiero

## 2023-11-18 NOTE — CONSULT NOTE ADULT - SUBJECTIVE AND OBJECTIVE BOX
MFM Consult Note    HPI: Patient is a 34 YO  at 27w2d who was sent in from ATU with elevated blood pressures and found to have preeclampsia with severe features.    Patient reports she felt off yesterday, so blood pressure was checked at ATU where she was undergoing growth ultrasound. Blood pressures elevated 162/104 and 157/102, so patient sent in for evaluation. On presentation, patient with multiple sustained severe range blood pressures for which she received Nifedipine IR 10 mg/20mg and Labetalol 20mg IV. Patient was started on Nifedipine 30 mg XL daily. Betamethasone was administered and magnesium initiated. Labs notable for elevated creatinine and transaminases, not meeting diagnostic thresholds for lab criteria of preeclampsia with severe features.    This morning, patient reports she is feeling well. She denies headaches, visual changes, chest pain, shortness of breath, RUQ/epigastric pain, or nausea/vomiting. She additionally denies contractions, vaginal bleeding, leakage of fluid and reports normal fetal movement    OBHx:   Prenatal course notable for low JEMAL-A with subsequent amniocentesis. Karyotype 46XY and microarray negative. Amniocentesis complicated by PPROM, but patient subsequently resealed.    GynHx: Denies cysts, fibroids, abnormal paps, STIs  PMHx: Herniated discs  PSHx: Open appendectomy, Back Surgery x2  Med: ASA, PNV, Levothyroxine 50mcg  All: NKDA    Objective:  Vitals:  T(C): 37.0 (2023 13:22), Max: 37.0 (2023 13:22)  T(F): 98.6 (2023 13:22), Max: 98.6 (2023 13:22)  HR: 84 (2023 14:09) (65 - 130)  BP: 114/55 (2023 14:07) (107/59 - 184/107)  RR: 17 (2023 05:22) (17 - 18)  SpO2: 94% (2023 14:09) (91% - 100%)    Urine output: ~40 cc/hr    Physical exam:  General: Resting comfortably in bed  Cardiac: Regular rate  Pulm: breathing comfortably on room air, lungs clear to auscultation (performed by Dr. Samson)  Neuro: 2+ DTRs (performed by Dr. Samson)    Fetal monitoring  Fetal: baseline 135 bpm, moderate variability, presence of accelerations, occasional variable deceleration, reassuring and appropriate for gestational age  Tocometry: acontractile    Labs                        11.6   13.30 )-----------( 210      ( 2023 12:20 )             33.7         131<L>  |  100  |  21  ----------------------------<  143<H>  4.9   |  17<L>  |  0.98    Ca    7.6<L>      2023 12:20  Mg     8.0         TPro  5.4<L>  /  Alb  2.9<L>  /  TBili  0.3  /  DBili  x   /  AST  60<H>  /  ALT  81<H>  /  AlkPhos  164<H>      UPC 8.0    Imaging:  Ultrasound 23: Vertex, MVP 4 cm,  gm 11%, AC 20%    EKG (23): wnl           MFM Consult Note    HPI: Patient is a 32 YO  at 27w2d who was sent in from ATU with elevated blood pressures and found to have preeclampsia with severe features.    Patient reports she felt off yesterday, so blood pressure was checked at ATU where she was undergoing growth ultrasound. Blood pressures elevated 162/104 and 157/102, so patient sent in for evaluation. On presentation, patient with multiple sustained severe range blood pressures for which she received Nifedipine IR 10 mg/20mg and Labetalol 20mg IV. Patient was started on Nifedipine 30 mg XL daily. Betamethasone was administered and magnesium initiated. Labs notable for elevated creatinine and transaminases, not meeting diagnostic thresholds for lab criteria of preeclampsia with severe features.    This morning, patient reports she is feeling well. She denies headaches, visual changes, chest pain, shortness of breath, RUQ/epigastric pain, or nausea/vomiting. She additionally denies contractions, vaginal bleeding, leakage of fluid and reports normal fetal movement    OBHx:   Prenatal course notable for low JEMAL-A with subsequent amniocentesis. Karyotype 46XY and microarray negative. Amniocentesis complicated by PPROM, but patient subsequently resealed.    GynHx: Denies cysts, fibroids, abnormal paps, STIs  PMHx: Herniated discs  PSHx: Open appendectomy, Back Surgery x2  Med: ASA, PNV, Levothyroxine 50mcg  All: NKDA    Objective:  Vitals:  T(C): 37.0 (2023 13:22), Max: 37.0 (2023 13:22)  T(F): 98.6 (2023 13:22), Max: 98.6 (2023 13:22)  HR: 84 (2023 14:09) (65 - 130)  BP: 114/55 (2023 14:07) (107/59 - 184/107)  RR: 17 (2023 05:22) (17 - 18)  SpO2: 94% (2023 14:09) (91% - 100%)    Urine output: ~40 cc/hr    Physical exam:  General: Resting comfortably in bed  Cardiac: Regular rate  Pulm: breathing comfortably on room air, lungs clear to auscultation (performed by Dr. Samson)  Neuro: 2+ DTRs (performed by Dr. Samson)    Fetal monitoring  Fetal: baseline 135 bpm, moderate variability, presence of accelerations, occasional variable deceleration, reassuring and appropriate for gestational age  Tocometry: acontractile    Labs                        11.6   13.30 )-----------( 210      ( 2023 12:20 )             33.7         131<L>  |  100  |  21  ----------------------------<  143<H>  4.9   |  17<L>  |  0.98    Ca    7.6<L>      2023 12:20  Mg     8.0         TPro  5.4<L>  /  Alb  2.9<L>  /  TBili  0.3  /  DBili  x   /  AST  60<H>  /  ALT  81<H>  /  AlkPhos  164<H>      UPC 8.0    Imaging:  Ultrasound 23: Vertex, MVP 4 cm,  gm 11%, AC 20%    EKG (23): wnl

## 2023-11-18 NOTE — CHART NOTE - NSCHARTNOTEFT_GEN_A_CORE
MFM Brief Event note  Full note to follow     at 27+ weeks with new onset severe preeclampsia by blood pressures and labs.   Blood pressure controlled on one agent with room to titrate and EFM reactive without decelerations.  Significant creatinine elevation with nephrotic range proteinuria by urine P:C 8.  Low plts and elevated AST / ALT though not quite at threshold for delivery.  Magnesium level this am initially 9+ with repeat reported at 29**, however clinically patient mentating well without lethargy or shortness of breath. Reflexes intact on resident and nurse exam, clear lungs. Mag value of 29** suspected to be attributed to level being drawn on same arm as mag infusion upstream, though mag had been held for approx 90 min prior to draw.    Mag remains off  EKG  Stat labs now  Defer calcium gluconate as clinical picture is not consistent with toxic magnesium level and patient remains at significant risk of eclampsia    Very high suspicion for delivery indicated at some point today - watching closely for blood pressures and change in labs  Appreciate Renal involvement - depending on postpartum course will assess utility for renal biopsy. Right now picture most consistent with HELLP.    Palak Ruggiero MFM Brief Event note  Full note to follow     at 27+ weeks with new onset severe preeclampsia by blood pressures and labs.   Blood pressure controlled on one agent with room to titrate and EFM reactive without decelerations.  Significant creatinine elevation with nephrotic range proteinuria by urine P:C 8.  Low plts and elevated AST / ALT though not quite at threshold for delivery.  Magnesium level this am initially 9+ with repeat reported at 29**, however clinically patient mentating well without lethargy or shortness of breath. Reflexes intact on resident and nurse exam, clear lungs. Mag value of 29** suspected to be attributed to level being drawn on same arm as mag infusion upstream, though mag had been held for approx 90 min prior to draw.    Mag remains off  EKG  Stat labs now  Defer calcium gluconate as clinical picture is not consistent with toxic magnesium level and patient remains at significant risk of eclampsia    Very high suspicion for delivery indicated at some point today - watching closely for blood pressures and change in labs.   Two peripheral IVs in place  Suggest consulting anesthesia - may want to place epidural catheter before platelets drop in setting of HELLP    Appreciate Nephrology involvement for hyponatremia and nephrotic range proteinuria - depending on postpartum course will assess utility for renal biopsy. Right now picture most consistent with HELLP.    Palak Ruggiero

## 2023-11-18 NOTE — CHART NOTE - NSCHARTNOTEFT_GEN_A_CORE
Spoke to MFM fellow Dr. Garcia who discussed patient's 10pm HELLP labs with Dr. Ruggiero with recommendation for delivery based on Cr 1.19 indicative of worsening severe pre-eclampsia. Remainder of HELLP labs stable. Discussed results and recommendation for delivery with patient who understands. She and her partner given opportunity to ask questions. Spoke to charge RN and anesthesia. Will set up OR for delivery. NICU aware.    d/w Dr. Mcdonald who is en route  KAREN Burdick, PGY3

## 2023-11-19 ENCOUNTER — NON-APPOINTMENT (OUTPATIENT)
Age: 33
End: 2023-11-19

## 2023-11-19 DIAGNOSIS — N17.9 ACUTE KIDNEY FAILURE, UNSPECIFIED: ICD-10-CM

## 2023-11-19 DIAGNOSIS — E87.1 HYPO-OSMOLALITY AND HYPONATREMIA: ICD-10-CM

## 2023-11-19 LAB
ALBUMIN SERPL ELPH-MCNC: 2.6 G/DL — LOW (ref 3.3–5)
ALBUMIN SERPL ELPH-MCNC: 2.9 G/DL — LOW (ref 3.3–5)
ALBUMIN SERPL ELPH-MCNC: 3 G/DL — LOW (ref 3.3–5)
ALP SERPL-CCNC: 142 U/L — HIGH (ref 40–120)
ALP SERPL-CCNC: 147 U/L — HIGH (ref 40–120)
ALP SERPL-CCNC: 163 U/L — HIGH (ref 40–120)
ALT FLD-CCNC: 64 U/L — HIGH (ref 10–45)
ALT FLD-CCNC: 66 U/L — HIGH (ref 10–45)
ALT FLD-CCNC: 77 U/L — HIGH (ref 10–45)
ANION GAP SERPL CALC-SCNC: 10 MMOL/L — SIGNIFICANT CHANGE UP (ref 5–17)
ANION GAP SERPL CALC-SCNC: 14 MMOL/L — SIGNIFICANT CHANGE UP (ref 5–17)
APTT BLD: 26.4 SEC — SIGNIFICANT CHANGE UP (ref 24.5–35.6)
APTT BLD: 27.8 SEC — SIGNIFICANT CHANGE UP (ref 24.5–35.6)
APTT BLD: 31.5 SEC — SIGNIFICANT CHANGE UP (ref 24.5–35.6)
APTT BLD: 32.6 SEC — SIGNIFICANT CHANGE UP (ref 24.5–35.6)
AST SERPL-CCNC: 41 U/L — HIGH (ref 10–40)
AST SERPL-CCNC: 43 U/L — HIGH (ref 10–40)
AST SERPL-CCNC: 49 U/L — HIGH (ref 10–40)
BASOPHILS # BLD AUTO: 0 K/UL — SIGNIFICANT CHANGE UP (ref 0–0.2)
BASOPHILS # BLD AUTO: 0.03 K/UL — SIGNIFICANT CHANGE UP (ref 0–0.2)
BASOPHILS # BLD AUTO: 0.05 K/UL — SIGNIFICANT CHANGE UP (ref 0–0.2)
BASOPHILS NFR BLD AUTO: 0 % — SIGNIFICANT CHANGE UP (ref 0–2)
BASOPHILS NFR BLD AUTO: 0.2 % — SIGNIFICANT CHANGE UP (ref 0–2)
BASOPHILS NFR BLD AUTO: 0.3 % — SIGNIFICANT CHANGE UP (ref 0–2)
BILIRUB SERPL-MCNC: 0.2 MG/DL — SIGNIFICANT CHANGE UP (ref 0.2–1.2)
BUN SERPL-MCNC: 25 MG/DL — HIGH (ref 7–23)
BUN SERPL-MCNC: 27 MG/DL — HIGH (ref 7–23)
BUN SERPL-MCNC: 29 MG/DL — HIGH (ref 7–23)
CALCIUM SERPL-MCNC: 6.7 MG/DL — LOW (ref 8.4–10.5)
CALCIUM SERPL-MCNC: 7.1 MG/DL — LOW (ref 8.4–10.5)
CHLORIDE SERPL-SCNC: 101 MMOL/L — SIGNIFICANT CHANGE UP (ref 96–108)
CHLORIDE SERPL-SCNC: 98 MMOL/L — SIGNIFICANT CHANGE UP (ref 96–108)
CHLORIDE SERPL-SCNC: 99 MMOL/L — SIGNIFICANT CHANGE UP (ref 96–108)
CO2 SERPL-SCNC: 17 MMOL/L — LOW (ref 22–31)
CO2 SERPL-SCNC: 18 MMOL/L — LOW (ref 22–31)
CO2 SERPL-SCNC: 19 MMOL/L — LOW (ref 22–31)
COLLECT DURATION TIME UR: 24 HR — SIGNIFICANT CHANGE UP
CREAT SERPL-MCNC: 1.13 MG/DL — SIGNIFICANT CHANGE UP (ref 0.5–1.3)
CREAT SERPL-MCNC: 1.18 MG/DL — SIGNIFICANT CHANGE UP (ref 0.5–1.3)
CREAT SERPL-MCNC: 1.28 MG/DL — SIGNIFICANT CHANGE UP (ref 0.5–1.3)
EGFR: 57 ML/MIN/1.73M2 — LOW
EGFR: 63 ML/MIN/1.73M2 — SIGNIFICANT CHANGE UP
EGFR: 66 ML/MIN/1.73M2 — SIGNIFICANT CHANGE UP
EOSINOPHIL # BLD AUTO: 0 K/UL — SIGNIFICANT CHANGE UP (ref 0–0.5)
EOSINOPHIL # BLD AUTO: 0.01 K/UL — SIGNIFICANT CHANGE UP (ref 0–0.5)
EOSINOPHIL NFR BLD AUTO: 0 % — SIGNIFICANT CHANGE UP (ref 0–6)
EOSINOPHIL NFR BLD AUTO: 0.1 % — SIGNIFICANT CHANGE UP (ref 0–6)
FIBRINOGEN PPP-MCNC: 301 MG/DL — SIGNIFICANT CHANGE UP (ref 200–445)
FIBRINOGEN PPP-MCNC: 331 MG/DL — SIGNIFICANT CHANGE UP (ref 200–445)
FIBRINOGEN PPP-MCNC: 365 MG/DL — SIGNIFICANT CHANGE UP (ref 200–445)
GIANT PLATELETS BLD QL SMEAR: PRESENT — SIGNIFICANT CHANGE UP
GLUCOSE SERPL-MCNC: 123 MG/DL — HIGH (ref 70–99)
GLUCOSE SERPL-MCNC: 127 MG/DL — HIGH (ref 70–99)
GLUCOSE SERPL-MCNC: 153 MG/DL — HIGH (ref 70–99)
GROUP B BETA STREP DNA (PCR): DETECTED
HCT VFR BLD CALC: 30.6 % — LOW (ref 34.5–45)
HCT VFR BLD CALC: 31.6 % — LOW (ref 34.5–45)
HCT VFR BLD CALC: 32.5 % — LOW (ref 34.5–45)
HGB BLD-MCNC: 10.6 G/DL — LOW (ref 11.5–15.5)
HGB BLD-MCNC: 10.9 G/DL — LOW (ref 11.5–15.5)
HGB BLD-MCNC: 11.4 G/DL — LOW (ref 11.5–15.5)
IMM GRANULOCYTES NFR BLD AUTO: 1.5 % — HIGH (ref 0–0.9)
IMM GRANULOCYTES NFR BLD AUTO: 2.1 % — HIGH (ref 0–0.9)
INR BLD: 0.79 RATIO — LOW (ref 0.85–1.18)
INR BLD: 0.8 RATIO — LOW (ref 0.85–1.18)
INR BLD: 0.85 RATIO — SIGNIFICANT CHANGE UP (ref 0.85–1.18)
INR BLD: 0.86 RATIO — SIGNIFICANT CHANGE UP (ref 0.85–1.18)
LDH SERPL L TO P-CCNC: 263 U/L — HIGH (ref 50–242)
LDH SERPL L TO P-CCNC: 274 U/L — HIGH (ref 50–242)
LDH SERPL L TO P-CCNC: 290 U/L — HIGH (ref 50–242)
LYMPHOCYTES # BLD AUTO: 1.48 K/UL — SIGNIFICANT CHANGE UP (ref 1–3.3)
LYMPHOCYTES # BLD AUTO: 1.74 K/UL — SIGNIFICANT CHANGE UP (ref 1–3.3)
LYMPHOCYTES # BLD AUTO: 10.5 % — LOW (ref 13–44)
LYMPHOCYTES # BLD AUTO: 10.8 % — LOW (ref 13–44)
LYMPHOCYTES # BLD AUTO: 2.28 K/UL — SIGNIFICANT CHANGE UP (ref 1–3.3)
LYMPHOCYTES # BLD AUTO: 7.6 % — LOW (ref 13–44)
MAGNESIUM SERPL-MCNC: 6.8 MG/DL — HIGH (ref 1.6–2.6)
MAGNESIUM SERPL-MCNC: 7.1 MG/DL — HIGH (ref 1.6–2.6)
MAGNESIUM SERPL-MCNC: 7.3 MG/DL — HIGH (ref 1.6–2.6)
MANUAL SMEAR VERIFICATION: SIGNIFICANT CHANGE UP
MCHC RBC-ENTMCNC: 32.2 PG — SIGNIFICANT CHANGE UP (ref 27–34)
MCHC RBC-ENTMCNC: 32.3 PG — SIGNIFICANT CHANGE UP (ref 27–34)
MCHC RBC-ENTMCNC: 32.8 PG — SIGNIFICANT CHANGE UP (ref 27–34)
MCHC RBC-ENTMCNC: 34.5 GM/DL — SIGNIFICANT CHANGE UP (ref 32–36)
MCHC RBC-ENTMCNC: 34.6 GM/DL — SIGNIFICANT CHANGE UP (ref 32–36)
MCHC RBC-ENTMCNC: 35.1 GM/DL — SIGNIFICANT CHANGE UP (ref 32–36)
MCV RBC AUTO: 93.3 FL — SIGNIFICANT CHANGE UP (ref 80–100)
MCV RBC AUTO: 93.4 FL — SIGNIFICANT CHANGE UP (ref 80–100)
MCV RBC AUTO: 93.5 FL — SIGNIFICANT CHANGE UP (ref 80–100)
MONOCYTES # BLD AUTO: 0.2 K/UL — SIGNIFICANT CHANGE UP (ref 0–0.9)
MONOCYTES # BLD AUTO: 0.79 K/UL — SIGNIFICANT CHANGE UP (ref 0–0.9)
MONOCYTES # BLD AUTO: 0.92 K/UL — HIGH (ref 0–0.9)
MONOCYTES NFR BLD AUTO: 0.9 % — LOW (ref 2–14)
MONOCYTES NFR BLD AUTO: 4 % — SIGNIFICANT CHANGE UP (ref 2–14)
MONOCYTES NFR BLD AUTO: 5.7 % — SIGNIFICANT CHANGE UP (ref 2–14)
NEUTROPHILS # BLD AUTO: 13.04 K/UL — HIGH (ref 1.8–7.4)
NEUTROPHILS # BLD AUTO: 16.97 K/UL — HIGH (ref 1.8–7.4)
NEUTROPHILS # BLD AUTO: 19.24 K/UL — HIGH (ref 1.8–7.4)
NEUTROPHILS NFR BLD AUTO: 81.1 % — HIGH (ref 43–77)
NEUTROPHILS NFR BLD AUTO: 86.6 % — HIGH (ref 43–77)
NEUTROPHILS NFR BLD AUTO: 88.6 % — HIGH (ref 43–77)
NRBC # BLD: 0 /100 WBCS — SIGNIFICANT CHANGE UP (ref 0–0)
PLAT MORPH BLD: ABNORMAL
PLATELET # BLD AUTO: 189 K/UL — SIGNIFICANT CHANGE UP (ref 150–400)
PLATELET # BLD AUTO: 200 K/UL — SIGNIFICANT CHANGE UP (ref 150–400)
PLATELET # BLD AUTO: 212 K/UL — SIGNIFICANT CHANGE UP (ref 150–400)
POTASSIUM SERPL-MCNC: 4.8 MMOL/L — SIGNIFICANT CHANGE UP (ref 3.5–5.3)
POTASSIUM SERPL-MCNC: 4.9 MMOL/L — SIGNIFICANT CHANGE UP (ref 3.5–5.3)
POTASSIUM SERPL-MCNC: 5 MMOL/L — SIGNIFICANT CHANGE UP (ref 3.5–5.3)
POTASSIUM SERPL-SCNC: 4.8 MMOL/L — SIGNIFICANT CHANGE UP (ref 3.5–5.3)
POTASSIUM SERPL-SCNC: 4.9 MMOL/L — SIGNIFICANT CHANGE UP (ref 3.5–5.3)
POTASSIUM SERPL-SCNC: 5 MMOL/L — SIGNIFICANT CHANGE UP (ref 3.5–5.3)
PROT 24H UR-MRATE: 2646 MG/24 H — HIGH (ref 50–100)
PROT SERPL-MCNC: 5 G/DL — LOW (ref 6–8.3)
PROT SERPL-MCNC: 5.2 G/DL — LOW (ref 6–8.3)
PROT SERPL-MCNC: 5.7 G/DL — LOW (ref 6–8.3)
PROTHROM AB SERPL-ACNC: 8.8 SEC — LOW (ref 9.5–13)
PROTHROM AB SERPL-ACNC: 8.9 SEC — LOW (ref 9.5–13)
PROTHROM AB SERPL-ACNC: 9 SEC — LOW (ref 9.5–13)
PROTHROM AB SERPL-ACNC: 9.1 SEC — LOW (ref 9.5–13)
RBC # BLD: 3.28 M/UL — LOW (ref 3.8–5.2)
RBC # BLD: 3.38 M/UL — LOW (ref 3.8–5.2)
RBC # BLD: 3.48 M/UL — LOW (ref 3.8–5.2)
RBC # FLD: 12.5 % — SIGNIFICANT CHANGE UP (ref 10.3–14.5)
RBC # FLD: 12.7 % — SIGNIFICANT CHANGE UP (ref 10.3–14.5)
RBC # FLD: 12.8 % — SIGNIFICANT CHANGE UP (ref 10.3–14.5)
RBC BLD AUTO: NORMAL — SIGNIFICANT CHANGE UP
SODIUM SERPL-SCNC: 127 MMOL/L — LOW (ref 135–145)
SODIUM SERPL-SCNC: 130 MMOL/L — LOW (ref 135–145)
SODIUM SERPL-SCNC: 131 MMOL/L — LOW (ref 135–145)
SOURCE GROUP B STREP: SIGNIFICANT CHANGE UP
TOTAL VOLUME - 24 HOUR: 1225 ML — SIGNIFICANT CHANGE UP
URATE SERPL-MCNC: 8.3 MG/DL — HIGH (ref 2.5–7)
URATE SERPL-MCNC: 8.4 MG/DL — HIGH (ref 2.5–7)
URINE CREATININE CALCULATION: 1.1 G/24 H — SIGNIFICANT CHANGE UP (ref 0.8–1.8)
WBC # BLD: 16.07 K/UL — HIGH (ref 3.8–10.5)
WBC # BLD: 19.59 K/UL — HIGH (ref 3.8–10.5)
WBC # BLD: 21.72 K/UL — HIGH (ref 3.8–10.5)
WBC # FLD AUTO: 16.07 K/UL — HIGH (ref 3.8–10.5)
WBC # FLD AUTO: 19.59 K/UL — HIGH (ref 3.8–10.5)
WBC # FLD AUTO: 21.72 K/UL — HIGH (ref 3.8–10.5)

## 2023-11-19 PROCEDURE — 88307 TISSUE EXAM BY PATHOLOGIST: CPT | Mod: 26

## 2023-11-19 PROCEDURE — 59510 CESAREAN DELIVERY: CPT

## 2023-11-19 PROCEDURE — 99223 1ST HOSP IP/OBS HIGH 75: CPT | Mod: GC

## 2023-11-19 RX ORDER — ONDANSETRON 8 MG/1
4 TABLET, FILM COATED ORAL EVERY 6 HOURS
Refills: 0 | Status: DISCONTINUED | OUTPATIENT
Start: 2023-11-19 | End: 2023-11-20

## 2023-11-19 RX ORDER — NALBUPHINE HYDROCHLORIDE 10 MG/ML
2.5 INJECTION, SOLUTION INTRAMUSCULAR; INTRAVENOUS; SUBCUTANEOUS EVERY 6 HOURS
Refills: 0 | Status: DISCONTINUED | OUTPATIENT
Start: 2023-11-19 | End: 2023-11-20

## 2023-11-19 RX ORDER — DIPHENHYDRAMINE HCL 50 MG
25 CAPSULE ORAL EVERY 6 HOURS
Refills: 0 | Status: DISCONTINUED | OUTPATIENT
Start: 2023-11-19 | End: 2023-11-23

## 2023-11-19 RX ORDER — MAGNESIUM SULFATE 500 MG/ML
2 VIAL (ML) INJECTION
Qty: 40 | Refills: 0 | Status: DISCONTINUED | OUTPATIENT
Start: 2023-11-19 | End: 2023-11-19

## 2023-11-19 RX ORDER — MORPHINE SULFATE 50 MG/1
0.1 CAPSULE, EXTENDED RELEASE ORAL ONCE
Refills: 0 | Status: DISCONTINUED | OUTPATIENT
Start: 2023-11-19 | End: 2023-11-20

## 2023-11-19 RX ORDER — ACETAMINOPHEN 500 MG
1000 TABLET ORAL EVERY 6 HOURS
Refills: 0 | Status: COMPLETED | OUTPATIENT
Start: 2023-11-19 | End: 2023-11-20

## 2023-11-19 RX ORDER — OXYCODONE HYDROCHLORIDE 5 MG/1
10 TABLET ORAL
Refills: 0 | Status: DISCONTINUED | OUTPATIENT
Start: 2023-11-19 | End: 2023-11-20

## 2023-11-19 RX ORDER — OXYCODONE HYDROCHLORIDE 5 MG/1
5 TABLET ORAL
Refills: 0 | Status: DISCONTINUED | OUTPATIENT
Start: 2023-11-19 | End: 2023-11-20

## 2023-11-19 RX ORDER — SIMETHICONE 80 MG/1
80 TABLET, CHEWABLE ORAL EVERY 4 HOURS
Refills: 0 | Status: DISCONTINUED | OUTPATIENT
Start: 2023-11-19 | End: 2023-11-23

## 2023-11-19 RX ORDER — LANOLIN
1 OINTMENT (GRAM) TOPICAL EVERY 6 HOURS
Refills: 0 | Status: DISCONTINUED | OUTPATIENT
Start: 2023-11-19 | End: 2023-11-23

## 2023-11-19 RX ORDER — MAGNESIUM SULFATE 500 MG/ML
1 VIAL (ML) INJECTION
Qty: 40 | Refills: 0 | Status: DISCONTINUED | OUTPATIENT
Start: 2023-11-19 | End: 2023-11-23

## 2023-11-19 RX ORDER — MAGNESIUM HYDROXIDE 400 MG/1
30 TABLET, CHEWABLE ORAL
Refills: 0 | Status: DISCONTINUED | OUTPATIENT
Start: 2023-11-19 | End: 2023-11-23

## 2023-11-19 RX ORDER — ACETAMINOPHEN 500 MG
975 TABLET ORAL EVERY 6 HOURS
Refills: 0 | Status: COMPLETED | OUTPATIENT
Start: 2023-11-19 | End: 2024-10-17

## 2023-11-19 RX ORDER — NALOXONE HYDROCHLORIDE 4 MG/.1ML
0.1 SPRAY NASAL
Refills: 0 | Status: DISCONTINUED | OUTPATIENT
Start: 2023-11-19 | End: 2023-11-20

## 2023-11-19 RX ORDER — TETANUS TOXOID, REDUCED DIPHTHERIA TOXOID AND ACELLULAR PERTUSSIS VACCINE, ADSORBED 5; 2.5; 8; 8; 2.5 [IU]/.5ML; [IU]/.5ML; UG/.5ML; UG/.5ML; UG/.5ML
0.5 SUSPENSION INTRAMUSCULAR ONCE
Refills: 0 | Status: COMPLETED | OUTPATIENT
Start: 2023-11-19

## 2023-11-19 RX ORDER — OXYCODONE HYDROCHLORIDE 5 MG/1
5 TABLET ORAL ONCE
Refills: 0 | Status: DISCONTINUED | OUTPATIENT
Start: 2023-11-19 | End: 2023-11-20

## 2023-11-19 RX ORDER — DEXAMETHASONE 0.5 MG/5ML
4 ELIXIR ORAL EVERY 6 HOURS
Refills: 0 | Status: DISCONTINUED | OUTPATIENT
Start: 2023-11-19 | End: 2023-11-20

## 2023-11-19 RX ORDER — OXYTOCIN 10 UNIT/ML
333.33 VIAL (ML) INJECTION
Qty: 20 | Refills: 0 | Status: DISCONTINUED | OUTPATIENT
Start: 2023-11-19 | End: 2023-11-23

## 2023-11-19 RX ORDER — SODIUM CHLORIDE 9 MG/ML
1000 INJECTION, SOLUTION INTRAVENOUS
Refills: 0 | Status: DISCONTINUED | OUTPATIENT
Start: 2023-11-19 | End: 2023-11-20

## 2023-11-19 RX ADMIN — Medication 1000 MILLIGRAM(S): at 22:06

## 2023-11-19 RX ADMIN — Medication 1000 MILLIGRAM(S): at 17:30

## 2023-11-19 RX ADMIN — Medication 30 MILLIGRAM(S): at 20:40

## 2023-11-19 RX ADMIN — HEPARIN SODIUM 5000 UNIT(S): 5000 INJECTION INTRAVENOUS; SUBCUTANEOUS at 14:11

## 2023-11-19 RX ADMIN — Medication 400 MILLIGRAM(S): at 21:09

## 2023-11-19 RX ADMIN — HEPARIN SODIUM 5000 UNIT(S): 5000 INJECTION INTRAVENOUS; SUBCUTANEOUS at 21:09

## 2023-11-19 RX ADMIN — ONDANSETRON 4 MILLIGRAM(S): 8 TABLET, FILM COATED ORAL at 21:08

## 2023-11-19 RX ADMIN — Medication 400 MILLIGRAM(S): at 16:37

## 2023-11-19 RX ADMIN — Medication 400 MILLIGRAM(S): at 10:00

## 2023-11-19 RX ADMIN — ONDANSETRON 4 MILLIGRAM(S): 8 TABLET, FILM COATED ORAL at 10:46

## 2023-11-19 RX ADMIN — Medication 4 MILLIGRAM(S): at 22:41

## 2023-11-19 RX ADMIN — OXYCODONE HYDROCHLORIDE 5 MILLIGRAM(S): 5 TABLET ORAL at 21:08

## 2023-11-19 RX ADMIN — OXYCODONE HYDROCHLORIDE 5 MILLIGRAM(S): 5 TABLET ORAL at 22:06

## 2023-11-19 NOTE — OB PROVIDER DELIVERY SUMMARY - NSSELHIDDEN_OBGYN_ALL_OB_FT
[NS_DeliveryAttending1_OBGYN_ALL_OB_FT:[id number]==],[NS_DeliveryAssist1_OBGYN_ALL_OB_FT:[id number]=],[NS_DeliveryRN_OBGYN_ALL_OB_FT:[id number]=] [NS_DeliveryAttending1_OBGYN_ALL_OB_FT:[id number]==],[NS_DeliveryAssist1_OBGYN_ALL_OB_FT:[id number]=],[NS_DeliveryRN_OBGYN_ALL_OB_FT:[id number]=] [NS_DeliveryAttending1_OBGYN_ALL_OB_FT:[id number]==],[NS_DeliveryAssist1_OBGYN_ALL_OB_FT:[id number]=],[NS_DeliveryRN_OBGYN_ALL_OB_FT:[id number]=]

## 2023-11-19 NOTE — OB PROVIDER DELIVERY SUMMARY - NSPROVIDERDELIVERYNOTE_OBGYN_ALL_OB_FT
Primary LTCS, uncomplicated  Viable male infant, vertex presentation, Apgars pending, cord gasses sent  Grossly normal fallopian tubes, uterus, and ovaries  Uterus closed in 1 locked running layer with PDS  Fascia closed with 1 vicryl  Skin closed with 4-0 vicryl    EBL: 272  IVF: 1000  UOP: 100    M. Heavenly, PGY3  w/ Dr. Mcdonald Primary LTCS, uncomplicated  Viable male infant, vertex presentation, Apgars pending, cord gasses sent  Grossly normal fallopian tubes, uterus, and ovaries  Uterus closed in 1 locked running layer with PDS then imbrucated with PDS  Fascia closed with 1 vicryl  Skin closed with 4-0 vicryl    EBL: 272  IVF: 1000  UOP: 100    M. Heavenly, PGY3  w/ Dr. Mcdonald Primary LTCS, uncomplicated for worsening severe PEC by Cr  Viable male infant, vertex presentation, Apgars 5/8, cord gasses sent  Grossly normal fallopian tubes, uterus, and ovaries  Uterus closed in 1 locked running layer with PDS then imbricated with PDS  Fascia closed with 1 vicryl  Skin closed with 4-0 vicryl    EBL: 272  IVF: 1000  UOP: 100    Dictation# [id number]    KAREN Burdick, PGY3  w/ Dr. Mcdonald Primary LTCS, uncomplicated for worsening severe PEC by Cr  Viable male infant, vertex presentation, Apgars 5/8, cord gasses sent  Grossly normal fallopian tubes, uterus, and ovaries  Uterus closed in 1 locked running layer with PDS then imbricated with PDS  Fascia closed with 1 vicryl  Skin closed with 4-0 vicryl    EBL: 272  IVF: 1000  UOP: 100    Dictation# [id number]    KAREN Burdick, PGY3  w/ Dr. Mcdonald Primary LTCS, uncomplicated for worsening severe PEC by Cr  Viable male infant, vertex presentation, Apgars 5/8, cord gasses sent  Grossly normal fallopian tubes, uterus, and ovaries  Uterus closed in 1 locked running layer with PDS then imbricated with PDS  Fascia closed with 1 vicryl  Skin closed with 4-0 vicryl    EBL: 272  IVF: 1000  UOP: 100    Dictation# [id number]    KAREN Burdick, PGY3  w/ Dr. Mcdonald

## 2023-11-19 NOTE — CONSULT NOTE ADULT - ATTENDING COMMENTS
32 y/o F with Preeclampsia with AUGUSTA and elevated BP. Was found to have hyponatremia of 124. Placed on IV magnesium and earlier today, pt got LTCS. Off the Magnesium drip now.  Alert, conversant.  Family in attendance  1.  Preeclampsia-- proteinuria, low Cata, hypertension, renal failure (Cr increased to low 1s).  Non oliguric, no HD at present.  Rx hypertension.  Check U/P Cr and likely serologic w/u  2.  Hypertension--Mg--> CCB (diltiazem IV if needed but nifedipine titration should be well tolerated) but NO RAASi.  Labetolol if CCB insufficient  3.  Hyponatremia--low 130s typical during pregnancy.  120s value likely errant but if persistent would warrant rx.    discussed with OB team  Desmond Goodman MD  contact me on TEAMS 34 y/o F with Preeclampsia with AUGUSTA and elevated BP. Was found to have hyponatremia of 124. Placed on IV magnesium and earlier today, pt got LTCS. Off the Magnesium drip now.  Alert, conversant.  Family in attendance  1.  Preeclampsia-- proteinuria, low Cata, hypertension, renal failure (Cr increased to low 1s).  Non oliguric, no HD at present.  Rx hypertension.  Check U/P Cr and likely serologic w/u  2.  Hypertension--Mg--> CCB (diltiazem IV if needed but nifedipine titration should be well tolerated) but NO RAASi.  Labetolol if CCB insufficient  3.  Hyponatremia--low 130s typical during pregnancy.  120s value likely errant but if persistent would warrant rx.    discussed with OB team  Desmond Goodman MD  contact me on TEAMS

## 2023-11-19 NOTE — OB RN DELIVERY SUMMARY - NSSELHIDDEN_OBGYN_ALL_OB_FT
[NS_DeliveryAttending1_OBGYN_ALL_OB_FT:[id number]==],[NS_DeliveryAssist1_OBGYN_ALL_OB_FT:[id number]=],[NS_DeliveryRN_OBGYN_ALL_OB_FT:[id number]=] [NS_DeliveryAttending1_OBGYN_ALL_OB_FT:[id number]==],[NS_DeliveryAssist1_OBGYN_ALL_OB_FT:[id number]=],[NS_DeliveryRN_OBGYN_ALL_OB_FT:[id number]=] [NS_DeliveryAttending1_OBGYN_ALL_OB_FT:[id number]],[NS_DeliveryAssist1_OBGYN_ALL_OB_FT:[id number]=],[NS_DeliveryRN_OBGYN_ALL_OB_FT:[id number]=]

## 2023-11-19 NOTE — PROVIDER CONTACT NOTE (OTHER) - ASSESSMENT
pt lethargic, nauseous, DTRs diminished, lungs clear. BP 90s/60s after being hyper/normotensive earlier in day.  pt complaining of nausea and pain, given oxy 5 and IV tyl, decadron, and zofran

## 2023-11-19 NOTE — CONSULT NOTE ADULT - PROBLEM SELECTOR RECOMMENDATION 2
Na of 124 but in the later labs it was 131 on the same day 2 hours apart. Sample could be diluted.   Urine Na was 9 and U osm 507.    PLAN:  initial 124 could be due to sample dilution.  Na was low 131 , could be due to hemodilution 2/2 pregnancy, Could be inappropriate response ( though placenta has vasopressinase activity - Its about balance). No acute intervention now. Repeat the Urine osm and Na tomorrow along with BMP.   rest of the management as per the primary team.

## 2023-11-19 NOTE — PROVIDER CONTACT NOTE (OTHER) - REASON
"Lexx Alamorick" Preston was seen and treated in our emergency department on 7/6/2022.  He may return to work on 07/06/2022.    Patient to be excused from work on 07/05/2022     If you have any questions or concerns, please don't hesitate to call.      Ricardo Hernandez NP"
"Lexx Alamorick" Preston was seen and treated in our emergency department on 7/6/2022.  He may return to work on 07/06/2022.    Patient to be excused from work on 07/05/2022     If you have any questions or concerns, please don't hesitate to call.      Ricardo Hernandez NP"
DTRs diminished, lethargic, nauseous, BP 90s/60s

## 2023-11-19 NOTE — OB RN INTRAOPERATIVE NOTE - NSSELHIDDEN_OBGYN_ALL_OB_FT
[NS_DeliveryAttending1_OBGYN_ALL_OB_FT:[id number]==],[NS_DeliveryAssist1_OBGYN_ALL_OB_FT:[id number]=],[NS_DeliveryRN_OBGYN_ALL_OB_FT:MjkzMTMzMDExOTA=] [NS_DeliveryAttending1_OBGYN_ALL_OB_FT:[id number]==],[NS_DeliveryAssist1_OBGYN_ALL_OB_FT:[id number]=],[NS_DeliveryRN_OBGYN_ALL_OB_FT:MjkzMTMzMDExOTA=] [NS_DeliveryAttending1_OBGYN_ALL_OB_FT:[id number]==],[NS_DeliveryAssist1_OBGYN_ALL_OB_FT:[id number]=],[NS_DeliveryRN_OBGYN_ALL_OB_FT:[id number]=]

## 2023-11-19 NOTE — CONSULT NOTE ADULT - SUBJECTIVE AND OBJECTIVE BOX
[facility] DIVISION OF KIDNEY DISEASES AND HYPERTENSION -- [phone]  -- INITIAL CONSULT NOTE  --------------------------------------------------------------------------------  HPI:  32 YO  at 27w2d who was sent in from ATU with elevated blood pressures and found to have preeclampsia with severe features.   Pt was found to have hyponatremia of 124 and nephrology was consulted for hyponatremia.   Pt has Scr of 0.87 at the time of admission and it has worsened gradually to 1.28 last night.                 PAST HISTORY  --------------------------------------------------------------------------------  PAST MEDICAL & SURGICAL HISTORY:  Hypothyroidism      History of appendectomy        FAMILY HISTORY:    PAST SOCIAL HISTORY:    ALLERGIES & MEDICATIONS  --------------------------------------------------------------------------------  Allergies    No Known Allergies    Intolerances      Standing Inpatient Medications  acetaminophen     Tablet .. 975 milliGRAM(s) Oral every 6 hours  acetaminophen   IVPB .. 1000 milliGRAM(s) IV Intermittent every 6 hours  dextrose 5% + lactated ringers. 1000 milliLiter(s) IV Continuous <Continuous>  diphtheria/tetanus/pertussis (acellular) Vaccine (Adacel) 0.5 milliLiter(s) IntraMuscular once  heparin   Injectable 5000 Unit(s) SubCutaneous every 8 hours  lactated ringers. 1000 milliLiter(s) IV Continuous <Continuous>  lactated ringers. 1000 milliLiter(s) IV Continuous <Continuous>  levothyroxine 50 MICROGram(s) Oral daily  magnesium sulfate Infusion 2 Gm/Hr IV Continuous <Continuous>  morphine PF Spinal 0.1 milliGRAM(s) IntraThecal. once  NIFEdipine XL 30 milliGRAM(s) Oral daily  oxytocin Infusion 333.333 milliUNIT(s)/Min IV Continuous <Continuous>    PRN Inpatient Medications  dexAMETHasone  Injectable 4 milliGRAM(s) IV Push every 6 hours PRN  diphenhydrAMINE 25 milliGRAM(s) Oral every 6 hours PRN  lanolin Ointment 1 Application(s) Topical every 6 hours PRN  magnesium hydroxide Suspension 30 milliLiter(s) Oral two times a day PRN  nalbuphine Injectable 2.5 milliGRAM(s) IV Push every 6 hours PRN  naloxone Injectable 0.1 milliGRAM(s) IV Push every 3 minutes PRN  ondansetron Injectable 4 milliGRAM(s) IV Push every 6 hours PRN  oxyCODONE    IR 5 milliGRAM(s) Oral every 3 hours PRN  oxyCODONE    IR 10 milliGRAM(s) Oral every 3 hours PRN  oxyCODONE    IR 5 milliGRAM(s) Oral every 3 hours PRN  oxyCODONE    IR 5 milliGRAM(s) Oral once PRN  simethicone 80 milliGRAM(s) Chew every 4 hours PRN      REVIEW OF SYSTEMS  ------------------------------------------------------------------------------  Pt was drowsy and was seen in PACU.   Not obtainable.       VITALS/PHYSICAL EXAM  --------------------------------------------------------------------------------  T(C): 36.5 (23 @ 14:35), Max: 36.9 (23 @ 19:37)  HR: 65 (23 @ 16:30) (65 - 126)  BP: 151/89 (23 @ 16:30) (116/57 - 151/89)  RR: 18 (23 @ 16:30) (18 - 18)  SpO2: 97% (23 @ 16:30) (91% - 98%)  Wt(kg): --  Height (cm): 154.9 (23 23:53)  Weight (kg): 68 (23 23:53)  BMI (kg/m2): 28.3 (23 23:53)  BSA (m2): 1.67 (23 @ 23:53)      23 @ 07:01  -  23 @ 07:00  --------------------------------------------------------  IN: 1826.8 mL / OUT: 1307 mL / NET: 519.8 mL    23 @ 07:01  -  23 @ 17:58  --------------------------------------------------------  IN: 0 mL / OUT: 210 mL / NET: -210 mL      Physical Exam:  Gen: NAD  HEENT: MMM  Pulm: CTA B/L  CV: S1S2  Abd: Surgical wound was in dressing.   Ext: No LE edema B/L  Neuro: Drowsy  Skin: Warm and dry  Vascular access:NA    LABS/STUDIES  --------------------------------------------------------------------------------              10.6   21.72 >-----------<  200      [23 @ 16:29]              30.6     127  |  98  |  29  ----------------------------<  127      [23 16:29]  5.0   |  19  |  1.13        Ca     6.7     [23 16:29]      Mg     7.3     [23 16:29]    TPro  5.2  /  Alb  2.9  /  TBili  0.2  /  DBili  x   /  AST  41  /  ALT  64  /  AlkPhos  142  [23 16:29]    PT/INR: PT 8.9  , INR 0.80       [23 16:29]  PTT: 32.6       [23 16:29]    Uric acid 8.3      [23 16:29]        [23 16:29]  Serum Osmolality 285      [23 22:18]    Creatinine Trend:  SCr 1.13 [ 16:29]  SCr 1.18 [ 08:14]  SCr 1.28 [ 00:53]  SCr 1.19 [ 22:17]  SCr 0.95 [ 16:14]    Urinalysis - [23 16:29]      Color  / Appearance  / SG  / pH       Gluc 127 / Ketone   / Bili  / Urobili        Blood  / Protein  / Leuk Est  / Nitrite       RBC  / WBC  / Hyaline  / Gran  / Sq Epi  / Non Sq Epi  / Bacteria     Urine Creatinine 109      [23 @ 22:17]  Urine Protein 126      [23 22:17]  Urine Sodium 8      [23 22:17]  Urine Osmolality 507      [23 22:17]         [facility] DIVISION OF KIDNEY DISEASES AND HYPERTENSION -- [phone]  -- INITIAL CONSULT NOTE  --------------------------------------------------------------------------------  HPI:  32 YO  at 27w2d who was sent in from ATU with elevated blood pressures and found to have preeclampsia with severe features.   Pt was found to have hyponatremia of 124 and nephrology was consulted for hyponatremia.   Pt has Scr of 0.87 at the time of admission and it has worsened gradually to 1.28 last night.                 PAST HISTORY  --------------------------------------------------------------------------------  PAST MEDICAL & SURGICAL HISTORY:  Hypothyroidism      History of appendectomy        FAMILY HISTORY:    PAST SOCIAL HISTORY:    ALLERGIES & MEDICATIONS  --------------------------------------------------------------------------------  Allergies    No Known Allergies    Intolerances      Standing Inpatient Medications  acetaminophen     Tablet .. 975 milliGRAM(s) Oral every 6 hours  acetaminophen   IVPB .. 1000 milliGRAM(s) IV Intermittent every 6 hours  dextrose 5% + lactated ringers. 1000 milliLiter(s) IV Continuous <Continuous>  diphtheria/tetanus/pertussis (acellular) Vaccine (Adacel) 0.5 milliLiter(s) IntraMuscular once  heparin   Injectable 5000 Unit(s) SubCutaneous every 8 hours  lactated ringers. 1000 milliLiter(s) IV Continuous <Continuous>  lactated ringers. 1000 milliLiter(s) IV Continuous <Continuous>  levothyroxine 50 MICROGram(s) Oral daily  magnesium sulfate Infusion 2 Gm/Hr IV Continuous <Continuous>  morphine PF Spinal 0.1 milliGRAM(s) IntraThecal. once  NIFEdipine XL 30 milliGRAM(s) Oral daily  oxytocin Infusion 333.333 milliUNIT(s)/Min IV Continuous <Continuous>    PRN Inpatient Medications  dexAMETHasone  Injectable 4 milliGRAM(s) IV Push every 6 hours PRN  diphenhydrAMINE 25 milliGRAM(s) Oral every 6 hours PRN  lanolin Ointment 1 Application(s) Topical every 6 hours PRN  magnesium hydroxide Suspension 30 milliLiter(s) Oral two times a day PRN  nalbuphine Injectable 2.5 milliGRAM(s) IV Push every 6 hours PRN  naloxone Injectable 0.1 milliGRAM(s) IV Push every 3 minutes PRN  ondansetron Injectable 4 milliGRAM(s) IV Push every 6 hours PRN  oxyCODONE    IR 5 milliGRAM(s) Oral every 3 hours PRN  oxyCODONE    IR 10 milliGRAM(s) Oral every 3 hours PRN  oxyCODONE    IR 5 milliGRAM(s) Oral every 3 hours PRN  oxyCODONE    IR 5 milliGRAM(s) Oral once PRN  simethicone 80 milliGRAM(s) Chew every 4 hours PRN      REVIEW OF SYSTEMS  ------------------------------------------------------------------------------  Pt was drowsy and was seen in PACU.   Not obtainable.       VITALS/PHYSICAL EXAM  --------------------------------------------------------------------------------  T(C): 36.5 (23 @ 14:35), Max: 36.9 (23 @ 19:37)  HR: 65 (23 @ 16:30) (65 - 126)  BP: 151/89 (23 @ 16:30) (116/57 - 151/89)  RR: 18 (23 @ 16:30) (18 - 18)  SpO2: 97% (23 @ 16:30) (91% - 98%)  Wt(kg): --  Height (cm): 154.9 (23 23:53)  Weight (kg): 68 (23 23:53)  BMI (kg/m2): 28.3 (23 23:53)  BSA (m2): 1.67 (23 @ 23:53)      23 @ 07:01  -  23 @ 07:00  --------------------------------------------------------  IN: 1826.8 mL / OUT: 1307 mL / NET: 519.8 mL    23 @ 07:01  -  23 @ 17:58  --------------------------------------------------------  IN: 0 mL / OUT: 210 mL / NET: -210 mL      Physical Exam:  Gen: NAD  HEENT: MMM  Pulm: CTA B/L  CV: S1S2  Abd: Surgical wound was in dressing.   Ext: No LE edema B/L  Neuro: Drowsy  Skin: Warm and dry  Vascular access:NA    LABS/STUDIES  --------------------------------------------------------------------------------              10.6   21.72 >-----------<  200      [23 @ 16:29]              30.6     127  |  98  |  29  ----------------------------<  127      [23 16:29]  5.0   |  19  |  1.13        Ca     6.7     [23 16:29]      Mg     7.3     [23 16:29]    TPro  5.2  /  Alb  2.9  /  TBili  0.2  /  DBili  x   /  AST  41  /  ALT  64  /  AlkPhos  142  [23 16:29]    PT/INR: PT 8.9  , INR 0.80       [23 16:29]  PTT: 32.6       [23 16:29]    Uric acid 8.3      [23 16:29]        [23 16:29]  Serum Osmolality 285      [23 22:18]    Creatinine Trend:  SCr 1.13 [ 16:29]  SCr 1.18 [ 08:14]  SCr 1.28 [ 00:53]  SCr 1.19 [ 22:17]  SCr 0.95 [ 16:14]    Urinalysis - [23 16:29]      Color  / Appearance  / SG  / pH       Gluc 127 / Ketone   / Bili  / Urobili        Blood  / Protein  / Leuk Est  / Nitrite       RBC  / WBC  / Hyaline  / Gran  / Sq Epi  / Non Sq Epi  / Bacteria     Urine Creatinine 109      [23 @ 22:17]  Urine Protein 126      [23 22:17]  Urine Sodium 8      [23 22:17]  Urine Osmolality 507      [23 22:17]         [facility] DIVISION OF KIDNEY DISEASES AND HYPERTENSION -- [phone]  -- INITIAL CONSULT NOTE  --------------------------------------------------------------------------------  HPI:  34 YO  at 27w2d who was sent in from ATU with elevated blood pressures and found to have preeclampsia with severe features.   Pt was found to have hyponatremia of 124 and nephrology was consulted for hyponatremia.   Pt has Scr of 0.87 at the time of admission and it has worsened gradually to 1.28 last night.                 PAST HISTORY  --------------------------------------------------------------------------------  PAST MEDICAL & SURGICAL HISTORY:  Hypothyroidism      History of appendectomy        FAMILY HISTORY:    PAST SOCIAL HISTORY:    ALLERGIES & MEDICATIONS  --------------------------------------------------------------------------------  Allergies    No Known Allergies    Intolerances      Standing Inpatient Medications  acetaminophen     Tablet .. 975 milliGRAM(s) Oral every 6 hours  acetaminophen   IVPB .. 1000 milliGRAM(s) IV Intermittent every 6 hours  dextrose 5% + lactated ringers. 1000 milliLiter(s) IV Continuous <Continuous>  diphtheria/tetanus/pertussis (acellular) Vaccine (Adacel) 0.5 milliLiter(s) IntraMuscular once  heparin   Injectable 5000 Unit(s) SubCutaneous every 8 hours  lactated ringers. 1000 milliLiter(s) IV Continuous <Continuous>  lactated ringers. 1000 milliLiter(s) IV Continuous <Continuous>  levothyroxine 50 MICROGram(s) Oral daily  magnesium sulfate Infusion 2 Gm/Hr IV Continuous <Continuous>  morphine PF Spinal 0.1 milliGRAM(s) IntraThecal. once  NIFEdipine XL 30 milliGRAM(s) Oral daily  oxytocin Infusion 333.333 milliUNIT(s)/Min IV Continuous <Continuous>    PRN Inpatient Medications  dexAMETHasone  Injectable 4 milliGRAM(s) IV Push every 6 hours PRN  diphenhydrAMINE 25 milliGRAM(s) Oral every 6 hours PRN  lanolin Ointment 1 Application(s) Topical every 6 hours PRN  magnesium hydroxide Suspension 30 milliLiter(s) Oral two times a day PRN  nalbuphine Injectable 2.5 milliGRAM(s) IV Push every 6 hours PRN  naloxone Injectable 0.1 milliGRAM(s) IV Push every 3 minutes PRN  ondansetron Injectable 4 milliGRAM(s) IV Push every 6 hours PRN  oxyCODONE    IR 5 milliGRAM(s) Oral every 3 hours PRN  oxyCODONE    IR 10 milliGRAM(s) Oral every 3 hours PRN  oxyCODONE    IR 5 milliGRAM(s) Oral every 3 hours PRN  oxyCODONE    IR 5 milliGRAM(s) Oral once PRN  simethicone 80 milliGRAM(s) Chew every 4 hours PRN      REVIEW OF SYSTEMS  ------------------------------------------------------------------------------  Pt was drowsy and was seen in PACU.   Not obtainable.       VITALS/PHYSICAL EXAM  --------------------------------------------------------------------------------  T(C): 36.5 (23 @ 14:35), Max: 36.9 (23 @ 19:37)  HR: 65 (23 @ 16:30) (65 - 126)  BP: 151/89 (23 @ 16:30) (116/57 - 151/89)  RR: 18 (23 @ 16:30) (18 - 18)  SpO2: 97% (23 @ 16:30) (91% - 98%)  Wt(kg): --  Height (cm): 154.9 (23 23:53)  Weight (kg): 68 (23 23:53)  BMI (kg/m2): 28.3 (23 23:53)  BSA (m2): 1.67 (23 @ 23:53)      23 @ 07:01  -  23 @ 07:00  --------------------------------------------------------  IN: 1826.8 mL / OUT: 1307 mL / NET: 519.8 mL    23 @ 07:01  -  23 @ 17:58  --------------------------------------------------------  IN: 0 mL / OUT: 210 mL / NET: -210 mL      Physical Exam:  Gen: NAD  HEENT: MMM  Pulm: CTA B/L  CV: S1S2  Abd: Surgical wound was in dressing.   Ext: No LE edema B/L  Neuro: Drowsy  Skin: Warm and dry  Vascular access:NA    LABS/STUDIES  --------------------------------------------------------------------------------              10.6   21.72 >-----------<  200      [23 @ 16:29]              30.6     127  |  98  |  29  ----------------------------<  127      [23 16:29]  5.0   |  19  |  1.13        Ca     6.7     [23 16:29]      Mg     7.3     [23 16:29]    TPro  5.2  /  Alb  2.9  /  TBili  0.2  /  DBili  x   /  AST  41  /  ALT  64  /  AlkPhos  142  [23 16:29]    PT/INR: PT 8.9  , INR 0.80       [23 16:29]  PTT: 32.6       [23 16:29]    Uric acid 8.3      [23 16:29]        [23 16:29]  Serum Osmolality 285      [23 22:18]    Creatinine Trend:  SCr 1.13 [ 16:29]  SCr 1.18 [ 08:14]  SCr 1.28 [ 00:53]  SCr 1.19 [ 22:17]  SCr 0.95 [ 16:14]    Urinalysis - [23 16:29]      Color  / Appearance  / SG  / pH       Gluc 127 / Ketone   / Bili  / Urobili        Blood  / Protein  / Leuk Est  / Nitrite       RBC  / WBC  / Hyaline  / Gran  / Sq Epi  / Non Sq Epi  / Bacteria     Urine Creatinine 109      [23 @ 22:17]  Urine Protein 126      [23 22:17]  Urine Sodium 8      [23 22:17]  Urine Osmolality 507      [23 22:17]

## 2023-11-19 NOTE — CONSULT NOTE ADULT - ASSESSMENT
32 y/o F with Preeclampsia with AUGUSTA and elevated BP. Was found to have hyponatremia of 124. Placed on IV magnesium and earlier today, pt got LTCS. Off the Magnesium drip now.      34 y/o F with Preeclampsia with AUGUSTA and elevated BP. Was found to have hyponatremia of 124. Placed on IV magnesium and earlier today, pt got LTCS. Off the Magnesium drip now.

## 2023-11-19 NOTE — OB PROVIDER LABOR PROGRESS NOTE - ASSESSMENT
34yo P0 @ 27+3/7 weeks admitted with PEC with severe features now with worsening renal function Cr 1.19.  labs repeated for confirmation and Cr 1.28.  Given patient received heparin sub-cutaneously at 10pm, she is not eligible for regional anesthesia until 4am.  I believe the risk to this very  fetus from general anesthesia is greater than the risk of patient's renal function worsening over the next 2  hours so plan will be for  delivery at 4am.  I consented the patient earlier and reiterated risks of bleeding, infection and injury to pelvic/abdominal structures including bladder, bowel and/or blood vessels.  I counseled the patient and her family that she will almost certainly need a classical uterine incision and would required  delivery for all future pregnancies.    Patricia Mcdonald MD 32yo P0 @ 27+3/7 weeks admitted with PEC with severe features now with worsening renal function Cr 1.19.  labs repeated for confirmation and Cr 1.28.  Given patient received heparin sub-cutaneously at 10pm, she is not eligible for regional anesthesia until 4am.  I believe the risk to this very  fetus from general anesthesia is greater than the risk of patient's renal function worsening over the next 2  hours so plan will be for  delivery at 4am.  I consented the patient earlier and reiterated risks of bleeding, infection and injury to pelvic/abdominal structures including bladder, bowel and/or blood vessels.  I counseled the patient and her family that she will almost certainly need a classical uterine incision and would required  delivery for all future pregnancies.    Patricia Mcdonald MD

## 2023-11-19 NOTE — CONSULT NOTE ADULT - PROBLEM SELECTOR RECOMMENDATION 9
Pt has baseline Scr of 0.55 and at the time of presentation 0.87 and gradually worsened to 1.28 and today it was 1.13. Pt was on Magnesium drip and its off now. Mg level is still high and Ca was low ( corrected for Albumin) 8.0    PLAN:  Please get iCa.   Its expected to have AUGUSTA improvement after the delivery.  Follow up with the BMP.

## 2023-11-19 NOTE — OB RN DELIVERY SUMMARY - NS_EXTRAMURALDEL_OBGYN_ALL_OB
Spoke w/pt to follow up on note below. Pt reports he did resume Plavix and he feels much better. He does not need a refill at this time.       Agnes alexander No

## 2023-11-19 NOTE — OB RN DELIVERY SUMMARY - NS_FETALMONITOR_OBGYN_ALL_OB
External [place]/External FHR External [place]/External FHR External [place]/External FHR

## 2023-11-20 LAB
ALBUMIN SERPL ELPH-MCNC: 1.9 G/DL — LOW (ref 3.3–5)
ALBUMIN SERPL ELPH-MCNC: 2 G/DL — LOW (ref 3.3–5)
ALBUMIN SERPL ELPH-MCNC: 2.2 G/DL — LOW (ref 3.3–5)
ALP SERPL-CCNC: 104 U/L — SIGNIFICANT CHANGE UP (ref 40–120)
ALP SERPL-CCNC: 114 U/L — SIGNIFICANT CHANGE UP (ref 40–120)
ALP SERPL-CCNC: 95 U/L — SIGNIFICANT CHANGE UP (ref 40–120)
ALT FLD-CCNC: 35 U/L — SIGNIFICANT CHANGE UP (ref 10–45)
ALT FLD-CCNC: 36 U/L — SIGNIFICANT CHANGE UP (ref 10–45)
ANION GAP SERPL CALC-SCNC: 6 MMOL/L — SIGNIFICANT CHANGE UP (ref 5–17)
ANION GAP SERPL CALC-SCNC: 8 MMOL/L — SIGNIFICANT CHANGE UP (ref 5–17)
APTT BLD: 16.6 SEC — LOW (ref 24.5–35.6)
APTT BLD: 25.5 SEC — SIGNIFICANT CHANGE UP (ref 24.5–35.6)
APTT BLD: 25.6 SEC — SIGNIFICANT CHANGE UP (ref 24.5–35.6)
AST SERPL-CCNC: 23 U/L — SIGNIFICANT CHANGE UP (ref 10–40)
AST SERPL-CCNC: 28 U/L — SIGNIFICANT CHANGE UP (ref 10–40)
AST SERPL-CCNC: 29 U/L — SIGNIFICANT CHANGE UP (ref 10–40)
BASOPHILS # BLD AUTO: 0 K/UL — SIGNIFICANT CHANGE UP (ref 0–0.2)
BASOPHILS # BLD AUTO: 0.03 K/UL — SIGNIFICANT CHANGE UP (ref 0–0.2)
BASOPHILS # BLD AUTO: 0.05 K/UL — SIGNIFICANT CHANGE UP (ref 0–0.2)
BASOPHILS NFR BLD AUTO: 0 % — SIGNIFICANT CHANGE UP (ref 0–2)
BASOPHILS NFR BLD AUTO: 0.1 % — SIGNIFICANT CHANGE UP (ref 0–2)
BASOPHILS NFR BLD AUTO: 0.2 % — SIGNIFICANT CHANGE UP (ref 0–2)
BILIRUB SERPL-MCNC: 0.1 MG/DL — LOW (ref 0.2–1.2)
BILIRUB SERPL-MCNC: 0.2 MG/DL — SIGNIFICANT CHANGE UP (ref 0.2–1.2)
BILIRUB SERPL-MCNC: <0.1 MG/DL — LOW (ref 0.2–1.2)
BUN SERPL-MCNC: 28 MG/DL — HIGH (ref 7–23)
BUN SERPL-MCNC: 30 MG/DL — HIGH (ref 7–23)
BUN SERPL-MCNC: 32 MG/DL — HIGH (ref 7–23)
CA-I BLD-SCNC: 0.86 MMOL/L — LOW (ref 1.15–1.33)
CALCIUM SERPL-MCNC: 5.7 MG/DL — CRITICAL LOW (ref 8.4–10.5)
CALCIUM SERPL-MCNC: 6 MG/DL — CRITICAL LOW (ref 8.4–10.5)
CALCIUM SERPL-MCNC: 7.3 MG/DL — LOW (ref 8.4–10.5)
CHLORIDE SERPL-SCNC: 98 MMOL/L — SIGNIFICANT CHANGE UP (ref 96–108)
CHLORIDE SERPL-SCNC: 99 MMOL/L — SIGNIFICANT CHANGE UP (ref 96–108)
CO2 SERPL-SCNC: 20 MMOL/L — LOW (ref 22–31)
CO2 SERPL-SCNC: 22 MMOL/L — SIGNIFICANT CHANGE UP (ref 22–31)
CREAT SERPL-MCNC: 1 MG/DL — SIGNIFICANT CHANGE UP (ref 0.5–1.3)
CREAT SERPL-MCNC: 1.13 MG/DL — SIGNIFICANT CHANGE UP (ref 0.5–1.3)
DRVVT RATIO: 1 RATIO — SIGNIFICANT CHANGE UP (ref 0–1.21)
DRVVT SCREEN TO CONFIRM RATIO: SIGNIFICANT CHANGE UP
EGFR: 66 ML/MIN/1.73M2 — SIGNIFICANT CHANGE UP
EGFR: 77 ML/MIN/1.73M2 — SIGNIFICANT CHANGE UP
EOSINOPHIL # BLD AUTO: 0 K/UL — SIGNIFICANT CHANGE UP (ref 0–0.5)
EOSINOPHIL NFR BLD AUTO: 0 % — SIGNIFICANT CHANGE UP (ref 0–6)
FIBRINOGEN PPP-MCNC: 195 MG/DL — LOW (ref 200–445)
FIBRINOGEN PPP-MCNC: 226 MG/DL — SIGNIFICANT CHANGE UP (ref 200–445)
FIBRINOGEN PPP-MCNC: 233 MG/DL — SIGNIFICANT CHANGE UP (ref 200–445)
GLUCOSE SERPL-MCNC: 103 MG/DL — HIGH (ref 70–99)
GLUCOSE SERPL-MCNC: 114 MG/DL — HIGH (ref 70–99)
GLUCOSE SERPL-MCNC: 131 MG/DL — HIGH (ref 70–99)
HCT VFR BLD CALC: 15.7 % — CRITICAL LOW (ref 34.5–45)
HCT VFR BLD CALC: 15.9 % — CRITICAL LOW (ref 34.5–45)
HCT VFR BLD CALC: 25 % — LOW (ref 34.5–45)
HGB BLD-MCNC: 5.4 G/DL — CRITICAL LOW (ref 11.5–15.5)
HGB BLD-MCNC: 8.9 G/DL — LOW (ref 11.5–15.5)
IMM GRANULOCYTES NFR BLD AUTO: 2.9 % — HIGH (ref 0–0.9)
IMM GRANULOCYTES NFR BLD AUTO: 3.8 % — HIGH (ref 0–0.9)
INR BLD: 0.83 RATIO — LOW (ref 0.85–1.18)
INR BLD: 0.84 RATIO — LOW (ref 0.85–1.18)
INR BLD: 0.86 RATIO — SIGNIFICANT CHANGE UP (ref 0.85–1.18)
LDH SERPL L TO P-CCNC: 232 U/L — SIGNIFICANT CHANGE UP (ref 50–242)
LDH SERPL L TO P-CCNC: 287 U/L — HIGH (ref 50–242)
LDH SERPL L TO P-CCNC: 300 U/L — HIGH (ref 50–242)
LYMPHOCYTES # BLD AUTO: 15 % — SIGNIFICANT CHANGE UP (ref 13–44)
LYMPHOCYTES # BLD AUTO: 20.7 % — SIGNIFICANT CHANGE UP (ref 13–44)
LYMPHOCYTES # BLD AUTO: 22.9 % — SIGNIFICANT CHANGE UP (ref 13–44)
LYMPHOCYTES # BLD AUTO: 3.27 K/UL — SIGNIFICANT CHANGE UP (ref 1–3.3)
LYMPHOCYTES # BLD AUTO: 4.66 K/UL — HIGH (ref 1–3.3)
LYMPHOCYTES # BLD AUTO: 5.02 K/UL — HIGH (ref 1–3.3)
MANUAL SMEAR VERIFICATION: SIGNIFICANT CHANGE UP
MCHC RBC-ENTMCNC: 31.8 PG — SIGNIFICANT CHANGE UP (ref 27–34)
MCHC RBC-ENTMCNC: 32.7 PG — SIGNIFICANT CHANGE UP (ref 27–34)
MCHC RBC-ENTMCNC: 32.9 PG — SIGNIFICANT CHANGE UP (ref 27–34)
MCHC RBC-ENTMCNC: 34 GM/DL — SIGNIFICANT CHANGE UP (ref 32–36)
MCHC RBC-ENTMCNC: 34.4 GM/DL — SIGNIFICANT CHANGE UP (ref 32–36)
MCHC RBC-ENTMCNC: 35.6 GM/DL — SIGNIFICANT CHANGE UP (ref 32–36)
MCV RBC AUTO: 89.3 FL — SIGNIFICANT CHANGE UP (ref 80–100)
MCV RBC AUTO: 95.7 FL — SIGNIFICANT CHANGE UP (ref 80–100)
MCV RBC AUTO: 96.4 FL — SIGNIFICANT CHANGE UP (ref 80–100)
METAMYELOCYTES # FLD: 0.9 % — HIGH (ref 0–0)
MONOCYTES # BLD AUTO: 1.55 K/UL — HIGH (ref 0–0.9)
MONOCYTES # BLD AUTO: 2.06 K/UL — HIGH (ref 0–0.9)
MONOCYTES # BLD AUTO: 2.41 K/UL — HIGH (ref 0–0.9)
MONOCYTES NFR BLD AUTO: 10.7 % — SIGNIFICANT CHANGE UP (ref 2–14)
MONOCYTES NFR BLD AUTO: 7.1 % — SIGNIFICANT CHANGE UP (ref 2–14)
MONOCYTES NFR BLD AUTO: 9.4 % — SIGNIFICANT CHANGE UP (ref 2–14)
MYELOCYTES NFR BLD: 0.9 % — HIGH (ref 0–0)
NEUTROPHILS # BLD AUTO: 14.19 K/UL — HIGH (ref 1.8–7.4)
NEUTROPHILS # BLD AUTO: 14.58 K/UL — HIGH (ref 1.8–7.4)
NEUTROPHILS # BLD AUTO: 16.6 K/UL — HIGH (ref 1.8–7.4)
NEUTROPHILS NFR BLD AUTO: 64.6 % — SIGNIFICANT CHANGE UP (ref 43–77)
NEUTROPHILS NFR BLD AUTO: 64.7 % — SIGNIFICANT CHANGE UP (ref 43–77)
NEUTROPHILS NFR BLD AUTO: 76.1 % — SIGNIFICANT CHANGE UP (ref 43–77)
NORMALIZED SCT PPP-RTO: 0.9 RATIO — SIGNIFICANT CHANGE UP (ref 0–1.16)
NORMALIZED SCT PPP-RTO: SIGNIFICANT CHANGE UP
NRBC # BLD: 0 /100 WBCS — SIGNIFICANT CHANGE UP (ref 0–0)
NRBC # BLD: 1 /100 WBCS — HIGH (ref 0–0)
OSMOLALITY UR: 252 MOS/KG — LOW (ref 300–900)
PLAT MORPH BLD: ABNORMAL
PLATELET # BLD AUTO: 193 K/UL — SIGNIFICANT CHANGE UP (ref 150–400)
PLATELET # BLD AUTO: 209 K/UL — SIGNIFICANT CHANGE UP (ref 150–400)
PLATELET # BLD AUTO: 221 K/UL — SIGNIFICANT CHANGE UP (ref 150–400)
POTASSIUM SERPL-MCNC: 5.2 MMOL/L — SIGNIFICANT CHANGE UP (ref 3.5–5.3)
POTASSIUM SERPL-MCNC: 5.7 MMOL/L — HIGH (ref 3.5–5.3)
POTASSIUM SERPL-SCNC: 5.2 MMOL/L — SIGNIFICANT CHANGE UP (ref 3.5–5.3)
POTASSIUM SERPL-SCNC: 5.7 MMOL/L — HIGH (ref 3.5–5.3)
POTASSIUM UR-SCNC: 12 MMOL/L — SIGNIFICANT CHANGE UP
PROT SERPL-MCNC: 3.6 G/DL — LOW (ref 6–8.3)
PROT SERPL-MCNC: 3.9 G/DL — LOW (ref 6–8.3)
PROT SERPL-MCNC: 4.2 G/DL — LOW (ref 6–8.3)
PROTHROM AB SERPL-ACNC: 8.8 SEC — LOW (ref 9.5–13)
PROTHROM AB SERPL-ACNC: 8.9 SEC — LOW (ref 9.5–13)
PROTHROM AB SERPL-ACNC: 9.1 SEC — LOW (ref 9.5–13)
RBC # BLD: 1.64 M/UL — LOW (ref 3.8–5.2)
RBC # BLD: 1.65 M/UL — LOW (ref 3.8–5.2)
RBC # BLD: 2.8 M/UL — LOW (ref 3.8–5.2)
RBC # FLD: 12.7 % — SIGNIFICANT CHANGE UP (ref 10.3–14.5)
RBC # FLD: 12.8 % — SIGNIFICANT CHANGE UP (ref 10.3–14.5)
RBC # FLD: 13.2 % — SIGNIFICANT CHANGE UP (ref 10.3–14.5)
RBC BLD AUTO: SIGNIFICANT CHANGE UP
SODIUM SERPL-SCNC: 126 MMOL/L — LOW (ref 135–145)
SODIUM SERPL-SCNC: 129 MMOL/L — LOW (ref 135–145)
SODIUM UR-SCNC: 106 MMOL/L — SIGNIFICANT CHANGE UP
URATE SERPL-MCNC: 7.7 MG/DL — HIGH (ref 2.5–7)
URATE SERPL-MCNC: 7.8 MG/DL — HIGH (ref 2.5–7)
URATE SERPL-MCNC: 7.9 MG/DL — HIGH (ref 2.5–7)
WBC # BLD: 21.81 K/UL — HIGH (ref 3.8–10.5)
WBC # BLD: 21.93 K/UL — HIGH (ref 3.8–10.5)
WBC # BLD: 22.55 K/UL — HIGH (ref 3.8–10.5)
WBC # FLD AUTO: 21.81 K/UL — HIGH (ref 3.8–10.5)
WBC # FLD AUTO: 21.93 K/UL — HIGH (ref 3.8–10.5)
WBC # FLD AUTO: 22.55 K/UL — HIGH (ref 3.8–10.5)

## 2023-11-20 PROCEDURE — 74177 CT ABD & PELVIS W/CONTRAST: CPT | Mod: 26

## 2023-11-20 PROCEDURE — 99232 SBSQ HOSP IP/OBS MODERATE 35: CPT | Mod: GC

## 2023-11-20 RX ORDER — TRAMADOL HYDROCHLORIDE 50 MG/1
25 TABLET ORAL EVERY 8 HOURS
Refills: 0 | Status: DISCONTINUED | OUTPATIENT
Start: 2023-11-20 | End: 2023-11-23

## 2023-11-20 RX ORDER — ACETAMINOPHEN 500 MG
975 TABLET ORAL EVERY 6 HOURS
Refills: 0 | Status: DISCONTINUED | OUTPATIENT
Start: 2023-11-20 | End: 2023-11-23

## 2023-11-20 RX ORDER — SODIUM ZIRCONIUM CYCLOSILICATE 10 G/10G
10 POWDER, FOR SUSPENSION ORAL ONCE
Refills: 0 | Status: COMPLETED | OUTPATIENT
Start: 2023-11-20 | End: 2023-11-20

## 2023-11-20 RX ORDER — SODIUM ZIRCONIUM CYCLOSILICATE 10 G/10G
10 POWDER, FOR SUSPENSION ORAL ONCE
Refills: 0 | Status: DISCONTINUED | OUTPATIENT
Start: 2023-11-20 | End: 2023-11-23

## 2023-11-20 RX ORDER — CALCIUM GLUCONATE 100 MG/ML
2 VIAL (ML) INTRAVENOUS ONCE
Refills: 0 | Status: COMPLETED | OUTPATIENT
Start: 2023-11-20 | End: 2023-11-20

## 2023-11-20 RX ORDER — FUROSEMIDE 40 MG
20 TABLET ORAL ONCE
Refills: 0 | Status: COMPLETED | OUTPATIENT
Start: 2023-11-20 | End: 2023-11-20

## 2023-11-20 RX ADMIN — SODIUM ZIRCONIUM CYCLOSILICATE 10 GRAM(S): 10 POWDER, FOR SUSPENSION ORAL at 20:49

## 2023-11-20 RX ADMIN — Medication 20 MILLIGRAM(S): at 20:18

## 2023-11-20 RX ADMIN — Medication 975 MILLIGRAM(S): at 09:21

## 2023-11-20 RX ADMIN — Medication 400 MILLIGRAM(S): at 03:02

## 2023-11-20 RX ADMIN — Medication 975 MILLIGRAM(S): at 15:59

## 2023-11-20 RX ADMIN — TRAMADOL HYDROCHLORIDE 25 MILLIGRAM(S): 50 TABLET ORAL at 12:01

## 2023-11-20 RX ADMIN — Medication 50 MICROGRAM(S): at 06:32

## 2023-11-20 RX ADMIN — Medication 30 MILLIGRAM(S): at 22:37

## 2023-11-20 RX ADMIN — HEPARIN SODIUM 5000 UNIT(S): 5000 INJECTION INTRAVENOUS; SUBCUTANEOUS at 13:46

## 2023-11-20 RX ADMIN — Medication 975 MILLIGRAM(S): at 10:28

## 2023-11-20 RX ADMIN — Medication 1000 MILLIGRAM(S): at 04:02

## 2023-11-20 RX ADMIN — Medication 200 GRAM(S): at 20:26

## 2023-11-20 RX ADMIN — TRAMADOL HYDROCHLORIDE 25 MILLIGRAM(S): 50 TABLET ORAL at 23:02

## 2023-11-20 RX ADMIN — TRAMADOL HYDROCHLORIDE 25 MILLIGRAM(S): 50 TABLET ORAL at 13:22

## 2023-11-20 RX ADMIN — HEPARIN SODIUM 5000 UNIT(S): 5000 INJECTION INTRAVENOUS; SUBCUTANEOUS at 06:32

## 2023-11-20 NOTE — CHART NOTE - NSCHARTNOTEFT_GEN_A_CORE
M Brief Contact Note:    Patient is POD1 from a  (27 week) primary  section ( mL) for preeclampsia with severe features (BPs, Creatinine). POD1 labs notable for hemoglobin drop to 5.4 from 10.6 on POD0. This is concern for intraabdominal bleeding given hemoglobin drop does not correlate with EBL. Patient is hemodynamically stable. Patient feels light-headed. Abdomen is distended and tender to palpation. FAST scan with possible blood anterior to uterus and RUQ.     Recommend:  -Transfer to PACU  -Continuous pulse ox/tele, serial BP  -IV x2  -3U pRBCs and 1U cryoprecipitate  -CT A/P with arterial and venous phasing    Jaclyn Garcia MD  [facility] Fellow  Attg: Dr. Jose Roberto Cid M Brief Contact Note:    Patient is POD1 from a  (27 week) primary  section ( mL) for preeclampsia with severe features (BPs, Creatinine). POD1 labs notable for hemoglobin drop to 5.4 from 10.6 on POD0. This is concern for intraabdominal bleeding given hemoglobin drop does not correlate with EBL. Patient is hemodynamically stable. Patient feels light-headed. Abdomen is distended and tender to palpation. FAST scan with possible blood anterior to uterus and RUQ.     Recommend:  -Transfer to PACU  -Continuous pulse ox/tele, serial BP  -IV x2  -3U pRBCs and 1U cryoprecipitate  -CT A/P with arterial and venous phasing    Jaclyn Garcia MD  [facility] Fellow  Attg: Dr. Jose Roberto Cid M Brief Contact Note:    Patient is POD1 from a  (27 week) primary  section ( mL) for preeclampsia with severe features (BPs, Creatinine). POD1 labs notable for hemoglobin drop to 5.4 from 10.6 on POD0. This is concern for intraabdominal bleeding given hemoglobin drop does not correlate with EBL. Patient is hemodynamically stable. Patient feels light-headed. Abdomen is distended and tender to palpation. FAST scan with possible blood anterior to uterus and RUQ.     Recommend:  -Transfer to PACU  -Continuous pulse ox/tele, serial BP  -IV x2  -3U pRBCs and 1U cryoprecipitate  -CT A/P with arterial and venous phasing    Jacyln Garcia MD  [facility] Fellow  Attg: Dr. Jose Roberto Cid

## 2023-11-20 NOTE — PROGRESS NOTE ADULT - SUBJECTIVE AND OBJECTIVE BOX
Postpartum Note     Interval events:    POD#1 s/p pLTCS@27w GA for sPEC. Patient seen and examined at bedside, no acute overnight events. Nauseous overnight after taking oxycodone. No acute complaints this AM. Not yet ambulating.  Not yet passing gas. Denies HA, epigastric pain, blurred vision, CP, SOB, N/V, fevers, and chills.     Vital Signs Last 24 Hours  T(C): 36.4 (11-20-23 @ 04:40), Max: 36.9 (11-19-23 @ 20:38)  HR: 62 (11-20-23 @ 04:40) (56 - 79)  BP: 92/57 (11-20-23 @ 04:40) (92/57 - 151/89)  RR: 18 (11-20-23 @ 04:40) (18 - 18)  SpO2: 98% (11-20-23 @ 04:40) (94% - 98%)      Physical Exam:  General: NAD  Abdomen: Soft, Mildly distended, appropriately tender, fundus firm. Incision C/D/I.    : Lochia wnl.  Florence catheter in place with clear urine.   Ext: 2+ pitting edema b/l, no erythema or tenderness.     Labs:             10.6   21.72 )-----------( 200      ( 11-19 @ 16:29 )             30.6     11-19 @ 16:29    127  |  98  |  29  ----------------------------<  127  5.0   |  19  |  1.13    Ca    6.7      11-19 @ 16:29  Mg     7.1     11-19 @ 22:44    TPro  5.2  /  Alb  2.9  /  TBili  0.2  /  DBili  x   /  AST  41  /  ALT  64  /  AlkPhos  142  11-19 @ 16:29    PT/INR - ( 11-19 @ 16:29 )   PT: 8.9 sec;   INR: 0.80 ratio    PTT - ( 11-19 @ 16:29 )  PTT:32.6 sec    Uric Acid: (11-19 @ 16:29)  8.3      Fibrinogen: (11-19 @ 16:29)  --       LDH: (11-19 @ 16:29)  290        MEDICATIONS  (STANDING):  acetaminophen     Tablet .. 975 milliGRAM(s) Oral every 6 hours  dextrose 5% + lactated ringers. 1000 milliLiter(s) (50 mL/Hr) IV Continuous <Continuous>  diphtheria/tetanus/pertussis (acellular) Vaccine (Adacel) 0.5 milliLiter(s) IntraMuscular once  heparin   Injectable 5000 Unit(s) SubCutaneous every 8 hours  lactated ringers. 1000 milliLiter(s) (75 mL/Hr) IV Continuous <Continuous>  lactated ringers. 1000 milliLiter(s) (50 mL/Hr) IV Continuous <Continuous>  levothyroxine 50 MICROGram(s) Oral daily  magnesium sulfate Infusion 1 Gm/Hr (25 mL/Hr) IV Continuous <Continuous>  magnesium sulfate Infusion 2 Gm/Hr (50 mL/Hr) IV Continuous <Continuous>  morphine PF Spinal 0.1 milliGRAM(s) IntraThecal. once  NIFEdipine XL 30 milliGRAM(s) Oral daily  oxytocin Infusion 333.333 milliUNIT(s)/Min (1000 mL/Hr) IV Continuous <Continuous>    MEDICATIONS  (PRN):  dexAMETHasone  Injectable 4 milliGRAM(s) IV Push every 6 hours PRN Nausea  diphenhydrAMINE 25 milliGRAM(s) Oral every 6 hours PRN Pruritus  lanolin Ointment 1 Application(s) Topical every 6 hours PRN Sore Nipples  magnesium hydroxide Suspension 30 milliLiter(s) Oral two times a day PRN Constipation  nalbuphine Injectable 2.5 milliGRAM(s) IV Push every 6 hours PRN Pruritus  naloxone Injectable 0.1 milliGRAM(s) IV Push every 3 minutes PRN For ANY of the following changes in patient status:  A. Breaths Per Minute LESS THAN 10, B. Oxygen saturation LESS THAN 90%, C. Sedation score of 6 for Stop After: 4 Times  ondansetron Injectable 4 milliGRAM(s) IV Push every 6 hours PRN Nausea  oxyCODONE    IR 5 milliGRAM(s) Oral every 3 hours PRN Mild Pain (1 - 3)  oxyCODONE    IR 10 milliGRAM(s) Oral every 3 hours PRN Moderate Pain (4 - 6)  oxyCODONE    IR 5 milliGRAM(s) Oral once PRN Moderate to Severe Pain (4-10)  oxyCODONE    IR 5 milliGRAM(s) Oral every 3 hours PRN Moderate to Severe Pain (4-10)  simethicone 80 milliGRAM(s) Chew every 4 hours PRN Gas

## 2023-11-20 NOTE — CHART NOTE - NSCHARTNOTEFT_GEN_A_CORE
R3 Clinical Update    Pt seen at bedside - reports feeling overall improved s/p 2uPRBC. Pain well controlled at this time.  Appears less pale than earlier.     Labs reviewed - H/h with appropriate rise to 8.9/25.0.  Potassium improved to 5.3. Spoke with nephro - will hold additional dose Lokelma at this time.   Hyponatremia mild improvement to 129  Hypocalcemia improving to 7.3  Cr improved to 1.0   Cryo running at this time.  VSS.     - Advance to clear liquid diet.  - Additional 1uPRBC to be transfused (3uPRBC total).   - Will repeat labs at 3am  - cont to monitor in PACU overnight    Patient and family updated at bedside - all questions answered.     D/w Dr Emanuel Snow-Salazar PGY3

## 2023-11-20 NOTE — CHART NOTE - NSCHARTNOTEFT_GEN_A_CORE
Note delayed secondary to clinical duties    Patient had 6am HELLP labs this morning which did not result until 11:30am. Per nurse the labs were sent between 6 and 7a and the delay was due to lab processing. CBC clotted and was resent at 2:30p which showed H/H dropped to 5.4/15.7. Informed by RN that the blood was drawn below the line but on the same side as where she was getting IVF on left side. Given concern for possible hemodilution in the setting of normal vital signs and otherwise reassuring overall appearance (patient reported abdominal pain but was due for Tramadol), repeat CBC sent drawn from right arm. Patient seen and assessed at bedside around 3:30pm after alerted by RN that patient felt lightheaded while getting up to void. Patient endorsed feeling lightheaded even while lying flat. Patient has not yet passed flatus. Denied dizziness, CP, SOB, N/V, fevers or chills. She had only voided 75cc since Florence was discontinued at 6:30am so Florence catheter replaced with 750 output. Shortly thereafter repeat CBC resulted at 5.4/15.9, fibrinogen went from 195->232. Shortly thereafter called by RN. Patient then transferred to PACU for higher level of care. 2u PRBC and 1u Cryo ordered. Right midline placed and CTAP ordered with plan for imaging around 5:30pm. Nephro also consulted for Na 126 and K 5.7.    T(C): 36.7 (20-23 @ 17:47), Max: 37.1 (20-23 @ 12:57)  HR: 70 (-20-23 @ 17:47) (56 - 76)  BP: 127/60 (11-20-23 @ 17:47) (92/57 - 133/86)  RR: 18 (-20-23 @ 12:57) (18 - 18)  SpO2: 97% (-20-23 @ 17:47) (95% - 98%)    Bedside palpation of HR in 80s  TAUS: no free fluid appreciated in posterior uterine cul-de-sac or by liver. Thin endometrial lining.    Gen: pale  CV: RRR  Abd: fundus firm, soft, moderately distended  : no vaginal bleeding    A/P:  POD#1 from C/S for sPEC now with acute blood loss anemia and moderately distended abdomen concerning for intra-abdominal bleeding. Patient hemodynamically stable and currently obtaining CT of abdomen/pelvis.    - f/u CTAP  - Additional unit of blood ordered. Plan for 3u PRBC + 1 Cryo  - f/u post transfusion CBC after 2u  - f/u nephro recs  - VSS, ctm    d/w Dr. Garcia MFM fellow and attending Dr. Emanuel Burdick, PGY3

## 2023-11-20 NOTE — PROGRESS NOTE ADULT - ATTENDING COMMENTS
32 y/o F with Preeclampsia with AUGUSTA and elevated BP. Was found to have hyponatremia of 124, last Na 127  Await labs from today.  1.  Preeclampsia-- proteinuria, low Cata, hypertension, renal failure (Cr increased to low 1s).    2.  Hypertension--Mg--> BP controlled. Hold antiHTN for hypotension  3.  Hyponatremia--Likely ADH mediated. Free water restrict 1 liter. Encourage po intake. Control pain and nausea. She has poor reaction to narcotics  I have no objection to limited 2-3 day of NSAIDs as needed for pain. Trend Cr  Stop IV fluids   Trend urine osm and SNa    Sadie Kauffman MD  Off: [phone]  contact me on teams    (After 5 pm or on weekends please page the on-call fellow/attending, can check [email or web address] for schedule. Login is torrie montanez, schedule under [facility] medicine, psych, derm) 32 y/o F with Preeclampsia with AUGUSTA and elevated BP. Was found to have hyponatremia of 124, last Na 127  Await labs from today.  1.  Preeclampsia-- proteinuria, low Cata, hypertension, renal failure (Cr increased to low 1s).    2.  Hypertension--Mg--> BP controlled. Hold antiHTN for hypotension  3.  Hyponatremia--Likely ADH mediated. Free water restrict 1 liter. Encourage po intake. Control pain and nausea. She has poor reaction to narcotics  I have no objection to limited 2-3 day of NSAIDs as needed for pain. Trend Cr  Stop IV fluids   Trend urine osm and SNa    Sadie Kauffman MD  Off: [phone]  contact me on teams    (After 5 pm or on weekends please page the on-call fellow/attending, can check [email or web address] for schedule. Login is torrie montanez, schedule under [facility] medicine, psych, derm) 34 y/o F with Preeclampsia with AUGUSTA and elevated BP. Was found to have hyponatremia of 124, last Na 127  Await labs from today.  1.  Preeclampsia-- proteinuria, low Cata, hypertension, renal failure (Cr increased to low 1s).    2.  Hypertension--Mg--> BP controlled. Hold antiHTN for hypotension  3.  Hyponatremia--Likely ADH mediated. Free water restrict 1 liter. Encourage po intake. Control pain and nausea. She has poor reaction to narcotics  I have no objection to limited 2-3 day of NSAIDs as needed for pain. Trend Cr  Stop IV fluids   Trend urine osm and SNa    Sadie Kauffman MD  Off: [phone]  contact me on teams    (After 5 pm or on weekends please page the on-call fellow/attending, can check [email or web address] for schedule. Login is torrie montanez, schedule under [facility] medicine, psych, derm)

## 2023-11-20 NOTE — CHART NOTE - NSCHARTNOTEFT_GEN_A_CORE
R3 Chart Note    Patient transported to and from CT scan in stable condition.  2uPRBC actively being transfused.   Spoke with radiology resident for preliminary read - see below.     < from: CT Abdomen and Pelvis w/ IV Cont (23 @ 18:51) >  ACC: [id number] EXAM:  CT ABDOMEN AND PELVIS IC   ORDERED BY: DIDI MORTON     PROCEDURE DATE:  2023    ******PRELIMINARY REPORT******      INTERPRETATION:  -Enlarged postgravid uterus status post  with   with blood products and foci of air within the endometrial canal.  -Postsurgical changes in the lower anterior abdominal wall. Rectus sheath   hematoma measuring 4.4x 13.6 x 14.3 cm (AP x TV x CC). Additional   extraperitoneal hematoma deep to the anterior abdominal wall and anterior   to the bladder measures approximately 1.6 x 11.3 x 7.2 cm (AP x TV x CC).   No evidence of active arterial bleed.  -Trace ascites.  -Florence catheter with tip seen tenting the right wall of the bladder.  -Small bilateral pleural effusions with associated bilateral lower lobe   partial atelectasis.    Vital Signs Last 24 Hrs  T(C): 37 (2023 20:15), Max: 37.1 (2023 12:57)  T(F): 98.6 (2023 20:15), Max: 98.8 (2023 12:57)  HR: 68 (2023 20:15) (56 - 76)  BP: 136/72 (2023 20:15) (92/57 - 136/72)  BP(mean): 99 (2023 20:15) (86 - 99)  RR: 18 (2023 20:15) (18 - 18)  SpO2: 98% (2023 20:15) (95% - 98%)    PLAN:   - repeat labs pending  (difficult lab draw due to 3rd spacing - spoke with anesthesia who will draw blood from R Midline)  - If H/h rising appropriately, will continue to trend q4-6 hours. If H/h continues to downtrend or not rise appropriately - concern for continued active bleeding and  will consider IR consult for possible embolization.   - pt to receive 1u cyro, additional 1uPRBC  - Spoke with Nephrology re hyperkalemia, hyponatremia, hypocalcemia - recommended 20mg Lasix IV x1, Lokelma 10 x2 doses, calcium gluconate x1.   - F/u repeat urine Osm  - NPO, holding HSQ  - f/u final CTAP  - cont to monitor in PACU    Patient seen at bedside with Dr Castellanos, plan discussed with patient and family members. All questions/concerns addressed.  JEEVAN Snow-Marie PGY3 R3 Chart Note    Patient transported to and from CT scan in stable condition.  2uPRBC actively being transfused.   Spoke with radiology resident for preliminary read - see below.     < from: CT Abdomen and Pelvis w/ IV Cont (23 @ 18:51) >  ACC: [id number] EXAM:  CT ABDOMEN AND PELVIS IC   ORDERED BY: DIDI MORTON     PROCEDURE DATE:  2023    ******PRELIMINARY REPORT******      INTERPRETATION:  -Enlarged postgravid uterus status post  with   with blood products and foci of air within the endometrial canal.  -Postsurgical changes in the lower anterior abdominal wall. Rectus sheath   hematoma measuring 4.4x 13.6 x 14.3 cm (AP x TV x CC). Additional   extraperitoneal hematoma deep to the anterior abdominal wall and anterior   to the bladder measures approximately 1.6 x 11.3 x 7.2 cm (AP x TV x CC).   No evidence of active arterial bleed.  -Trace ascites.  -Florence catheter with tip seen tenting the right wall of the bladder.  -Small bilateral pleural effusions with associated bilateral lower lobe   partial atelectasis.    Vital Signs Last 24 Hrs  T(C): 37 (2023 20:15), Max: 37.1 (2023 12:57)  T(F): 98.6 (2023 20:15), Max: 98.8 (2023 12:57)  HR: 68 (2023 20:15) (56 - 76)  BP: 136/72 (2023 20:15) (92/57 - 136/72)  BP(mean): 99 (2023 20:15) (86 - 99)  RR: 18 (2023 20:15) (18 - 18)  SpO2: 98% (2023 20:15) (95% - 98%)    PLAN:   - repeat labs pending  (difficult lab draw due to 3rd spacing - spoke with anesthesia who will draw blood from R Midline)  - If H/h rising appropriately, will continue to trend q4-6 hours. If H/h continues to downtrend or not rise appropriately - concern for continued active bleeding and  will consider IR consult for possible embolization.   - pt to receive 1u cyro, additional 1uPRBC  - Spoke with Nephrology re hyperkalemia, hyponatremia, hypocalcemia - recommended 20mg Lasix IV x1, Lokelma 10 x2 doses, calcium gluconate x1.   - F/u repeat urine Osm  - NPO, holding HSQ  - f/u final CTAP  - cont to monitor in PACU    Patient seen at bedside with Dr Castellanos, plan discussed with patient and family members. All questions/concerns addressed.  JEEVAN Snow-Marie PGY3 R3 Chart Note    Patient transported to and from CT scan in stable condition.  2uPRBC actively being transfused.   Spoke with radiology resident for preliminary read - see below.     < from: CT Abdomen and Pelvis w/ IV Cont (23 @ 18:51) >  ACC: [id number] EXAM:  CT ABDOMEN AND PELVIS IC   ORDERED BY: DIDI MORTON     PROCEDURE DATE:  2023    ******PRELIMINARY REPORT******      INTERPRETATION:  -Enlarged postgravid uterus status post  with   with blood products and foci of air within the endometrial canal.  -Postsurgical changes in the lower anterior abdominal wall. Rectus sheath   hematoma measuring 4.4x 13.6 x 14.3 cm (AP x TV x CC). Additional   extraperitoneal hematoma deep to the anterior abdominal wall and anterior   to the bladder measures approximately 1.6 x 11.3 x 7.2 cm (AP x TV x CC).   No evidence of active arterial bleed.  -Trace ascites.  -Florence catheter with tip seen tenting the right wall of the bladder.  -Small bilateral pleural effusions with associated bilateral lower lobe   partial atelectasis.    Vital Signs Last 24 Hrs  T(C): 37 (2023 20:15), Max: 37.1 (2023 12:57)  T(F): 98.6 (2023 20:15), Max: 98.8 (2023 12:57)  HR: 68 (2023 20:15) (56 - 76)  BP: 136/72 (2023 20:15) (92/57 - 136/72)  BP(mean): 99 (2023 20:15) (86 - 99)  RR: 18 (2023 20:15) (18 - 18)  SpO2: 98% (2023 20:15) (95% - 98%)    PLAN:   - repeat labs pending  (difficult lab draw due to 3rd spacing - spoke with anesthesia who will draw blood from R Midline)  - If H/h rising appropriately, will continue to trend q4-6 hours. If H/h continues to downtrend or not rise appropriately - concern for continued active bleeding and  will consider IR consult for possible embolization.   - pt to receive 1u cyro, additional 1uPRBC  - Spoke with Nephrology re hyperkalemia, hyponatremia, hypocalcemia - recommended 20mg Lasix IV x1, Lokelma 10 x2 doses, calcium gluconate x1.   - F/u repeat urine Osm  - NPO, holding HSQ  - f/u final CTAP  - cont to monitor in PACU    Patient seen at bedside with Dr Castellanos, plan discussed with patient and family members. All questions/concerns addressed.  JEEVAN Snow-Marie PGY3 R3 Chart Note    Patient transported to and from CT scan in stable condition.  2uPRBC actively being transfused.   Spoke with radiology resident for preliminary read - see below.     < from: CT Abdomen and Pelvis w/ IV Cont (23 @ 18:51) >  ACC: [id number] EXAM:  CT ABDOMEN AND PELVIS IC   ORDERED BY: DIDI MORTON     PROCEDURE DATE:  2023    ******PRELIMINARY REPORT******      INTERPRETATION:  -Enlarged postgravid uterus status post  with   with blood products and foci of air within the endometrial canal.  -Postsurgical changes in the lower anterior abdominal wall. Rectus sheath   hematoma measuring 4.4x 13.6 x 14.3 cm (AP x TV x CC). Additional   extraperitoneal hematoma deep to the anterior abdominal wall and anterior   to the bladder measures approximately 1.6 x 11.3 x 7.2 cm (AP x TV x CC).   No evidence of active arterial bleed.  -Trace ascites.  -Florence catheter with tip seen tenting the right wall of the bladder.  -Small bilateral pleural effusions with associated bilateral lower lobe   partial atelectasis.    Vital Signs Last 24 Hrs  T(C): 37 (2023 20:15), Max: 37.1 (2023 12:57)  T(F): 98.6 (2023 20:15), Max: 98.8 (2023 12:57)  HR: 68 (2023 20:15) (56 - 76)  BP: 136/72 (2023 20:15) (92/57 - 136/72)  BP(mean): 99 (2023 20:15) (86 - 99)  RR: 18 (2023 20:15) (18 - 18)  SpO2: 98% (2023 20:15) (95% - 98%)    PLAN:   - repeat labs pending  (difficult lab draw due to 3rd spacing - spoke with anesthesia who will draw blood from R Midline)  - If H/h rising appropriately, will continue to trend q4-6 hours. If H/h continues to downtrend or not rise appropriately - concern for continued active bleeding and  will consider IR consult for possible embolization.   - pt to receive 1u cyro, additional 1uPRBC  - Spoke with Nephrology re hyperkalemia, hyponatremia, hypocalcemia - recommended 20mg Lasix IV x1, Lokelma 10 x2 doses, calcium gluconate x1.   - F/u repeat urine Osm  - NPO, holding HSQ  - f/u final CTAP        - cont to monitor in PACU    Patient seen at bedside with Dr Castellanos, plan discussed with patient and family members. All questions/concerns addressed.  JEEVAN Aly PGY3        Attending  pt seen with resident  agree with above R3 Chart Note    Patient transported to and from CT scan in stable condition.  2uPRBC actively being transfused.   Spoke with radiology resident for preliminary read - see below.     < from: CT Abdomen and Pelvis w/ IV Cont (23 @ 18:51) >  ACC: [id number] EXAM:  CT ABDOMEN AND PELVIS IC   ORDERED BY: DIDI MORTON     PROCEDURE DATE:  2023    ******PRELIMINARY REPORT******      INTERPRETATION:  -Enlarged postgravid uterus status post  with   with blood products and foci of air within the endometrial canal.  -Postsurgical changes in the lower anterior abdominal wall. Rectus sheath   hematoma measuring 4.4x 13.6 x 14.3 cm (AP x TV x CC). Additional   extraperitoneal hematoma deep to the anterior abdominal wall and anterior   to the bladder measures approximately 1.6 x 11.3 x 7.2 cm (AP x TV x CC).   No evidence of active arterial bleed.  -Trace ascites.  -Florence catheter with tip seen tenting the right wall of the bladder.  -Small bilateral pleural effusions with associated bilateral lower lobe   partial atelectasis.    Vital Signs Last 24 Hrs  T(C): 37 (2023 20:15), Max: 37.1 (2023 12:57)  T(F): 98.6 (2023 20:15), Max: 98.8 (2023 12:57)  HR: 68 (2023 20:15) (56 - 76)  BP: 136/72 (2023 20:15) (92/57 - 136/72)  BP(mean): 99 (2023 20:15) (86 - 99)  RR: 18 (2023 20:15) (18 - 18)  SpO2: 98% (2023 20:15) (95% - 98%)    PLAN:   - repeat labs pending  (difficult lab draw due to 3rd spacing - spoke with anesthesia who will draw blood from R Midline)  - If H/h rising appropriately, will continue to trend q4-6 hours. If H/h continues to downtrend or not rise appropriately - concern for continued active bleeding and  will consider IR consult for possible embolization.   - pt to receive 1u cyro, additional 1uPRBC  - Spoke with Nephrology re hyperkalemia, hyponatremia, hypocalcemia - recommended 20mg Lasix IV x1, Lokelma 10 x2 doses, calcium gluconate x1.   - F/u repeat urine Osm  - NPO, holding HSQ  - f/u final CTAP        - cont to monitor in PACU    Patient seen at bedside with Dr Castellanos, plan discussed with patient and family members. All questions/concerns addressed.  JEEVAN Aly PGY3        Attending  pt seen with resident  agree with above R3 Chart Note    Patient transported to and from CT scan in stable condition.  2uPRBC actively being transfused.   Spoke with radiology resident for preliminary read - see below.     < from: CT Abdomen and Pelvis w/ IV Cont (23 @ 18:51) >  ACC: [id number] EXAM:  CT ABDOMEN AND PELVIS IC   ORDERED BY: DIDI MORTON     PROCEDURE DATE:  2023    ******PRELIMINARY REPORT******      INTERPRETATION:  -Enlarged postgravid uterus status post  with   with blood products and foci of air within the endometrial canal.  -Postsurgical changes in the lower anterior abdominal wall. Rectus sheath   hematoma measuring 4.4x 13.6 x 14.3 cm (AP x TV x CC). Additional   extraperitoneal hematoma deep to the anterior abdominal wall and anterior   to the bladder measures approximately 1.6 x 11.3 x 7.2 cm (AP x TV x CC).   No evidence of active arterial bleed.  -Trace ascites.  -Florence catheter with tip seen tenting the right wall of the bladder.  -Small bilateral pleural effusions with associated bilateral lower lobe   partial atelectasis.    Vital Signs Last 24 Hrs  T(C): 37 (2023 20:15), Max: 37.1 (2023 12:57)  T(F): 98.6 (2023 20:15), Max: 98.8 (2023 12:57)  HR: 68 (2023 20:15) (56 - 76)  BP: 136/72 (2023 20:15) (92/57 - 136/72)  BP(mean): 99 (2023 20:15) (86 - 99)  RR: 18 (2023 20:15) (18 - 18)  SpO2: 98% (2023 20:15) (95% - 98%)    PLAN:   - repeat labs pending  (difficult lab draw due to 3rd spacing - spoke with anesthesia who will draw blood from R Midline)  - If H/h rising appropriately, will continue to trend q4-6 hours. If H/h continues to downtrend or not rise appropriately - concern for continued active bleeding and  will consider IR consult for possible embolization.   - pt to receive 1u cyro, additional 1uPRBC  - Spoke with Nephrology re hyperkalemia, hyponatremia, hypocalcemia - recommended 20mg Lasix IV x1, Lokelma 10 x2 doses, calcium gluconate x1.   - F/u repeat urine Osm  - NPO, holding HSQ  - f/u final CTAP        - cont to monitor in PACU    Patient seen at bedside with Dr Castellanos, plan discussed with patient and family members. All questions/concerns addressed.  JEEVAN Aly PGY3        Attending  pt seen with resident  agree with above

## 2023-11-20 NOTE — PROGRESS NOTE ADULT - SUBJECTIVE AND OBJECTIVE BOX
[facility] Division of Kidney Diseases & Hypertension  FOLLOW UP NOTE  --------------------------------------------------------------------------------  Chief Complaint:    24 hour events/subjective:    complains of poor reaction to narcotics and made her nauseous and vomit  drinking lots of water    PAST HISTORY  --------------------------------------------------------------------------------  No significant changes to PMH, PSH, FHx, SHx, unless otherwise noted    ALLERGIES & MEDICATIONS  --------------------------------------------------------------------------------  Allergies    No Known Allergies    Intolerances      Standing Inpatient Medications  acetaminophen     Tablet .. 975 milliGRAM(s) Oral every 6 hours  dextrose 5% + lactated ringers. 1000 milliLiter(s) IV Continuous <Continuous>  diphtheria/tetanus/pertussis (acellular) Vaccine (Adacel) 0.5 milliLiter(s) IntraMuscular once  heparin   Injectable 5000 Unit(s) SubCutaneous every 8 hours  lactated ringers. 1000 milliLiter(s) IV Continuous <Continuous>  lactated ringers. 1000 milliLiter(s) IV Continuous <Continuous>  levothyroxine 50 MICROGram(s) Oral daily  magnesium sulfate Infusion 2 Gm/Hr IV Continuous <Continuous>  magnesium sulfate Infusion 1 Gm/Hr IV Continuous <Continuous>  NIFEdipine XL 30 milliGRAM(s) Oral daily  oxytocin Infusion 333.333 milliUNIT(s)/Min IV Continuous <Continuous>    PRN Inpatient Medications  diphenhydrAMINE 25 milliGRAM(s) Oral every 6 hours PRN  lanolin Ointment 1 Application(s) Topical every 6 hours PRN  magnesium hydroxide Suspension 30 milliLiter(s) Oral two times a day PRN  oxyCODONE    IR 5 milliGRAM(s) Oral every 3 hours PRN  oxyCODONE    IR 5 milliGRAM(s) Oral once PRN  simethicone 80 milliGRAM(s) Chew every 4 hours PRN      REVIEW OF SYSTEMS  --------------------------------------------------------------------------------    All other systems were reviewed and are negative, except as noted.    VITALS/PHYSICAL EXAM  --------------------------------------------------------------------------------  T(C): 36.5 (11-20-23 @ 08:57), Max: 36.9 (11-19-23 @ 20:38)  HR: 72 (11-20-23 @ 08:57) (56 - 72)  BP: 100/69 (11-20-23 @ 08:57) (92/57 - 151/89)  RR: 18 (11-20-23 @ 08:57) (18 - 18)  SpO2: 97% (11-20-23 @ 08:57) (95% - 98%)  Wt(kg): --  Height (cm): 154.9 (11-18-23 @ 23:53)  Weight (kg): 68 (11-18-23 @ 23:53)  BMI (kg/m2): 28.3 (11-18-23 @ 23:53)  BSA (m2): 1.67 (11-18-23 @ 23:53)      11-19-23 @ 07:01  -  11-20-23 @ 07:00  --------------------------------------------------------  IN: 0 mL / OUT: 1880 mL / NET: -1880 mL      Physical Exam:  	Gen: NAD  	Pulm: CTA B/L  	Abd: +BS,  	UE: Warm, edema  	LE: Warm, edema  	Neuro: No focal deficits, intact gait  	Psych: Normal affect and mood  	Skin: Warm, without rashes    LABS/STUDIES  --------------------------------------------------------------------------------              10.6   21.72 >-----------<  200      [11-19-23 @ 16:29]              30.6     127  |  98  |  29  ----------------------------<  127      [11-19-23 @ 16:29]  5.0   |  19  |  1.13        Ca     6.7     [11-19-23 @ 16:29]      Mg     7.1     [11-19-23 @ 22:44]    TPro  5.2  /  Alb  2.9  /  TBili  0.2  /  DBili  x   /  AST  41  /  ALT  64  /  AlkPhos  142  [11-19-23 @ 16:29]    PT/INR: PT 8.9  , INR 0.80       [11-19-23 @ 16:29]  PTT: 32.6       [11-19-23 @ 16:29]    Uric acid 8.3      [11-19-23 @ 16:29]        [11-19-23 @ 16:29]  Serum Osmolality 285      [11-18-23 @ 22:18]    Creatinine Trend:  SCr 1.13 [11-19 @ 16:29]  SCr 1.18 [11-19 @ 08:14]  SCr 1.28 [11-19 @ 00:53]  SCr 1.19 [11-18 @ 22:17]  SCr 0.95 [11-18 @ 16:14]    Urinalysis - [11-19-23 @ 16:29]      Color  / Appearance  / SG  / pH       Gluc 127 / Ketone   / Bili  / Urobili        Blood  / Protein  / Leuk Est  / Nitrite       RBC  / WBC  / Hyaline  / Gran  / Sq Epi  / Non Sq Epi  / Bacteria     Urine Creatinine 109      [11-18-23 @ 22:17]  Urine Protein 126      [11-18-23 @ 22:17]  Urine Sodium 8      [11-18-23 @ 22:17]  Urine Osmolality 507      [11-18-23 @ 22:17]         [facility] Division of Kidney Diseases & Hypertension  FOLLOW UP NOTE  --------------------------------------------------------------------------------  Chief Complaint:    24 hour events/subjective:    complains of poor reaction to narcotics and made her nauseous and vomit  drinking lots of water    PAST HISTORY  --------------------------------------------------------------------------------  No significant changes to PMH, PSH, FHx, SHx, unless otherwise noted    ALLERGIES & MEDICATIONS  --------------------------------------------------------------------------------  Allergies    No Known Allergies    Intolerances      Standing Inpatient Medications  acetaminophen     Tablet .. 975 milliGRAM(s) Oral every 6 hours  dextrose 5% + lactated ringers. 1000 milliLiter(s) IV Continuous <Continuous>  diphtheria/tetanus/pertussis (acellular) Vaccine (Adacel) 0.5 milliLiter(s) IntraMuscular once  heparin   Injectable 5000 Unit(s) SubCutaneous every 8 hours  lactated ringers. 1000 milliLiter(s) IV Continuous <Continuous>  lactated ringers. 1000 milliLiter(s) IV Continuous <Continuous>  levothyroxine 50 MICROGram(s) Oral daily  magnesium sulfate Infusion 2 Gm/Hr IV Continuous <Continuous>  magnesium sulfate Infusion 1 Gm/Hr IV Continuous <Continuous>  NIFEdipine XL 30 milliGRAM(s) Oral daily  oxytocin Infusion 333.333 milliUNIT(s)/Min IV Continuous <Continuous>    PRN Inpatient Medications  diphenhydrAMINE 25 milliGRAM(s) Oral every 6 hours PRN  lanolin Ointment 1 Application(s) Topical every 6 hours PRN  magnesium hydroxide Suspension 30 milliLiter(s) Oral two times a day PRN  oxyCODONE    IR 5 milliGRAM(s) Oral every 3 hours PRN  oxyCODONE    IR 5 milliGRAM(s) Oral once PRN  simethicone 80 milliGRAM(s) Chew every 4 hours PRN      REVIEW OF SYSTEMS  --------------------------------------------------------------------------------    All other systems were reviewed and are negative, except as noted.    VITALS/PHYSICAL EXAM  --------------------------------------------------------------------------------  T(C): 36.5 (11-20-23 @ 08:57), Max: 36.9 (11-19-23 @ 20:38)  HR: 72 (11-20-23 @ 08:57) (56 - 72)  BP: 100/69 (11-20-23 @ 08:57) (92/57 - 151/89)  RR: 18 (11-20-23 @ 08:57) (18 - 18)  SpO2: 97% (11-20-23 @ 08:57) (95% - 98%)  Wt(kg): --  Height (cm): 154.9 (11-18-23 @ 23:53)  Weight (kg): 68 (11-18-23 @ 23:53)  BMI (kg/m2): 28.3 (11-18-23 @ 23:53)  BSA (m2): 1.67 (11-18-23 @ 23:53)      11-19-23 @ 07:01  -  11-20-23 @ 07:00  --------------------------------------------------------  IN: 0 mL / OUT: 1880 mL / NET: -1880 mL      Physical Exam:  	Gen: NAD  	Pulm: CTA B/L  	Abd: +BS,  	UE: Warm, edema  	LE: Warm, edema  	Neuro: No focal deficits, intact gait  	Psych: Normal affect and mood  	Skin: Warm, without rashes    LABS/STUDIES  --------------------------------------------------------------------------------              10.6   21.72 >-----------<  200      [11-19-23 @ 16:29]              30.6     127  |  98  |  29  ----------------------------<  127      [11-19-23 @ 16:29]  5.0   |  19  |  1.13        Ca     6.7     [11-19-23 @ 16:29]      Mg     7.1     [11-19-23 @ 22:44]    TPro  5.2  /  Alb  2.9  /  TBili  0.2  /  DBili  x   /  AST  41  /  ALT  64  /  AlkPhos  142  [11-19-23 @ 16:29]    PT/INR: PT 8.9  , INR 0.80       [11-19-23 @ 16:29]  PTT: 32.6       [11-19-23 @ 16:29]    Uric acid 8.3      [11-19-23 @ 16:29]        [11-19-23 @ 16:29]  Serum Osmolality 285      [11-18-23 @ 22:18]    Creatinine Trend:  SCr 1.13 [11-19 @ 16:29]  SCr 1.18 [11-19 @ 08:14]  SCr 1.28 [11-19 @ 00:53]  SCr 1.19 [11-18 @ 22:17]  SCr 0.95 [11-18 @ 16:14]    Urinalysis - [11-19-23 @ 16:29]      Color  / Appearance  / SG  / pH       Gluc 127 / Ketone   / Bili  / Urobili        Blood  / Protein  / Leuk Est  / Nitrite       RBC  / WBC  / Hyaline  / Gran  / Sq Epi  / Non Sq Epi  / Bacteria     Urine Creatinine 109      [11-18-23 @ 22:17]  Urine Protein 126      [11-18-23 @ 22:17]  Urine Sodium 8      [11-18-23 @ 22:17]  Urine Osmolality 507      [11-18-23 @ 22:17]         [facility] Division of Kidney Diseases & Hypertension  FOLLOW UP NOTE  --------------------------------------------------------------------------------  Chief Complaint:    24 hour events/subjective:    complains of poor reaction to narcotics and made her nauseous and vomit  drinking lots of water    PAST HISTORY  --------------------------------------------------------------------------------  No significant changes to PMH, PSH, FHx, SHx, unless otherwise noted    ALLERGIES & MEDICATIONS  --------------------------------------------------------------------------------  Allergies    No Known Allergies    Intolerances      Standing Inpatient Medications  acetaminophen     Tablet .. 975 milliGRAM(s) Oral every 6 hours  dextrose 5% + lactated ringers. 1000 milliLiter(s) IV Continuous <Continuous>  diphtheria/tetanus/pertussis (acellular) Vaccine (Adacel) 0.5 milliLiter(s) IntraMuscular once  heparin   Injectable 5000 Unit(s) SubCutaneous every 8 hours  lactated ringers. 1000 milliLiter(s) IV Continuous <Continuous>  lactated ringers. 1000 milliLiter(s) IV Continuous <Continuous>  levothyroxine 50 MICROGram(s) Oral daily  magnesium sulfate Infusion 2 Gm/Hr IV Continuous <Continuous>  magnesium sulfate Infusion 1 Gm/Hr IV Continuous <Continuous>  NIFEdipine XL 30 milliGRAM(s) Oral daily  oxytocin Infusion 333.333 milliUNIT(s)/Min IV Continuous <Continuous>    PRN Inpatient Medications  diphenhydrAMINE 25 milliGRAM(s) Oral every 6 hours PRN  lanolin Ointment 1 Application(s) Topical every 6 hours PRN  magnesium hydroxide Suspension 30 milliLiter(s) Oral two times a day PRN  oxyCODONE    IR 5 milliGRAM(s) Oral every 3 hours PRN  oxyCODONE    IR 5 milliGRAM(s) Oral once PRN  simethicone 80 milliGRAM(s) Chew every 4 hours PRN      REVIEW OF SYSTEMS  --------------------------------------------------------------------------------    All other systems were reviewed and are negative, except as noted.    VITALS/PHYSICAL EXAM  --------------------------------------------------------------------------------  T(C): 36.5 (11-20-23 @ 08:57), Max: 36.9 (11-19-23 @ 20:38)  HR: 72 (11-20-23 @ 08:57) (56 - 72)  BP: 100/69 (11-20-23 @ 08:57) (92/57 - 151/89)  RR: 18 (11-20-23 @ 08:57) (18 - 18)  SpO2: 97% (11-20-23 @ 08:57) (95% - 98%)  Wt(kg): --  Height (cm): 154.9 (11-18-23 @ 23:53)  Weight (kg): 68 (11-18-23 @ 23:53)  BMI (kg/m2): 28.3 (11-18-23 @ 23:53)  BSA (m2): 1.67 (11-18-23 @ 23:53)      11-19-23 @ 07:01  -  11-20-23 @ 07:00  --------------------------------------------------------  IN: 0 mL / OUT: 1880 mL / NET: -1880 mL      Physical Exam:  	Gen: NAD  	Pulm: CTA B/L  	Abd: +BS,  	UE: Warm, edema  	LE: Warm, edema  	Neuro: No focal deficits, intact gait  	Psych: Normal affect and mood  	Skin: Warm, without rashes    LABS/STUDIES  --------------------------------------------------------------------------------              10.6   21.72 >-----------<  200      [11-19-23 @ 16:29]              30.6     127  |  98  |  29  ----------------------------<  127      [11-19-23 @ 16:29]  5.0   |  19  |  1.13        Ca     6.7     [11-19-23 @ 16:29]      Mg     7.1     [11-19-23 @ 22:44]    TPro  5.2  /  Alb  2.9  /  TBili  0.2  /  DBili  x   /  AST  41  /  ALT  64  /  AlkPhos  142  [11-19-23 @ 16:29]    PT/INR: PT 8.9  , INR 0.80       [11-19-23 @ 16:29]  PTT: 32.6       [11-19-23 @ 16:29]    Uric acid 8.3      [11-19-23 @ 16:29]        [11-19-23 @ 16:29]  Serum Osmolality 285      [11-18-23 @ 22:18]    Creatinine Trend:  SCr 1.13 [11-19 @ 16:29]  SCr 1.18 [11-19 @ 08:14]  SCr 1.28 [11-19 @ 00:53]  SCr 1.19 [11-18 @ 22:17]  SCr 0.95 [11-18 @ 16:14]    Urinalysis - [11-19-23 @ 16:29]      Color  / Appearance  / SG  / pH       Gluc 127 / Ketone   / Bili  / Urobili        Blood  / Protein  / Leuk Est  / Nitrite       RBC  / WBC  / Hyaline  / Gran  / Sq Epi  / Non Sq Epi  / Bacteria     Urine Creatinine 109      [11-18-23 @ 22:17]  Urine Protein 126      [11-18-23 @ 22:17]  Urine Sodium 8      [11-18-23 @ 22:17]  Urine Osmolality 507      [11-18-23 @ 22:17]

## 2023-11-20 NOTE — PROGRESS NOTE ADULT - PROBLEM SELECTOR PLAN 1
Pt has baseline Scr of 0.55 and at the time of presentation 0.87 and gradually worsened to 1.28 and today it was 1.13.     PLAN:    Its expected to have AUGUSTA improvement after the delivery.  Follow up with the BMP today Pt has baseline Scr of 0.55 and at the time of presentation 0.87 and gradually worsened to 1.28 and today it was 1.13.     PLAN:    Its expected to have AUUGSTA improvement after the delivery.  Follow up with the BMP today

## 2023-11-20 NOTE — PROGRESS NOTE ADULT - ASSESSMENT
A/P: 34yo POD#1 s/p pLTCS @27w3d for sPEC complicated by AUGUSTA, mild transaminitis and hyponatremia. Concern for hemodilution as Na normalized within short time frame.   Patient stable overnight.     #sPEC  - s/p Magnesium for seizure prophylaxis x24h  - BP's well controlled overnight  - Cont Procardia 30 QD. S/p Pro 10/20 (11/17), Lab 20 (11/17)  - HELLP labs abnormal; f/u AM HELLP labs      -- transaminitis: 34/43->74/83-> 63/72->>43/66      -- Plts: 215-> 129-> ->>>212      -- Cr: 0.55->>1.19->1.28->1.18      -- P/C: 8.1->1.2  - F/u 24h urine protein collection --> consider Lovenox for postpartum prophylaxis in setting of possible nephrotic range proteinuria  - cont to monitor for S/s sPEC  - holding NSAIDS until AM HELLP labs    #hyponatremia  - F/u AM BMP, urine osm, iCa  - Concern for hemodilution as Na normalized within short time frame on 11/19  - appreciate Nephrology consult    #postpartum   - Continue regular diet.  - Increase ambulation.  - Continue tylenol, oxycodone PRN for pain control.   - F/u AM CBC    SGolden-Salazar, PGY3 A/P: 32yo POD#1 s/p pLTCS @27w3d for sPEC complicated by AUGUSTA, mild transaminitis and hyponatremia. Concern for hemodilution as Na normalized within short time frame.   Patient stable overnight.     #sPEC  - s/p Magnesium for seizure prophylaxis x24h  - BP's well controlled overnight  - Cont Procardia 30 QD. S/p Pro 10/20 (11/17), Lab 20 (11/17)  - HELLP labs abnormal; f/u AM HELLP labs      -- transaminitis: 34/43->74/83-> 63/72->>43/66      -- Plts: 215-> 129-> ->>>212      -- Cr: 0.55->>1.19->1.28->1.18      -- P/C: 8.1->1.2  - F/u 24h urine protein collection --> consider Lovenox for postpartum prophylaxis in setting of possible nephrotic range proteinuria  - cont to monitor for S/s sPEC  - holding NSAIDS until AM HELLP labs    #hyponatremia  - F/u AM BMP, urine osm, iCa  - Concern for hemodilution as Na normalized within short time frame on 11/19  - appreciate Nephrology consult    #postpartum   - Continue regular diet.  - Increase ambulation.  - Continue tylenol, oxycodone PRN for pain control.   - F/u AM CBC    SGolden-Salazar, PGY3

## 2023-11-21 LAB
ALBUMIN SERPL ELPH-MCNC: 2.1 G/DL — LOW (ref 3.3–5)
ALBUMIN SERPL ELPH-MCNC: 2.3 G/DL — LOW (ref 3.3–5)
ALP SERPL-CCNC: 104 U/L — SIGNIFICANT CHANGE UP (ref 40–120)
ALP SERPL-CCNC: 107 U/L — SIGNIFICANT CHANGE UP (ref 40–120)
ALP SERPL-CCNC: 116 U/L — SIGNIFICANT CHANGE UP (ref 40–120)
ALT FLD-CCNC: 28 U/L — SIGNIFICANT CHANGE UP (ref 10–45)
ALT FLD-CCNC: 31 U/L — SIGNIFICANT CHANGE UP (ref 10–45)
ALT FLD-CCNC: 32 U/L — SIGNIFICANT CHANGE UP (ref 10–45)
ANION GAP SERPL CALC-SCNC: 6 MMOL/L — SIGNIFICANT CHANGE UP (ref 5–17)
ANION GAP SERPL CALC-SCNC: 7 MMOL/L — SIGNIFICANT CHANGE UP (ref 5–17)
ANION GAP SERPL CALC-SCNC: 9 MMOL/L — SIGNIFICANT CHANGE UP (ref 5–17)
APTT BLD: 26.5 SEC — SIGNIFICANT CHANGE UP (ref 24.5–35.6)
APTT BLD: 29.6 SEC — SIGNIFICANT CHANGE UP (ref 24.5–35.6)
APTT BLD: 31.2 SEC — SIGNIFICANT CHANGE UP (ref 24.5–35.6)
AST SERPL-CCNC: 21 U/L — SIGNIFICANT CHANGE UP (ref 10–40)
AST SERPL-CCNC: 22 U/L — SIGNIFICANT CHANGE UP (ref 10–40)
AST SERPL-CCNC: 23 U/L — SIGNIFICANT CHANGE UP (ref 10–40)
B2 GLYCOPROT1 AB SER QL: NEGATIVE — SIGNIFICANT CHANGE UP
BASOPHILS # BLD AUTO: 0.09 K/UL — SIGNIFICANT CHANGE UP (ref 0–0.2)
BASOPHILS # BLD AUTO: 0.13 K/UL — SIGNIFICANT CHANGE UP (ref 0–0.2)
BASOPHILS # BLD AUTO: 0.2 K/UL — SIGNIFICANT CHANGE UP (ref 0–0.2)
BASOPHILS NFR BLD AUTO: 0.4 % — SIGNIFICANT CHANGE UP (ref 0–2)
BASOPHILS NFR BLD AUTO: 0.6 % — SIGNIFICANT CHANGE UP (ref 0–2)
BASOPHILS NFR BLD AUTO: 0.9 % — SIGNIFICANT CHANGE UP (ref 0–2)
BILIRUB SERPL-MCNC: 0.2 MG/DL — SIGNIFICANT CHANGE UP (ref 0.2–1.2)
BLD GP AB SCN SERPL QL: NEGATIVE — SIGNIFICANT CHANGE UP
BUN SERPL-MCNC: 14 MG/DL — SIGNIFICANT CHANGE UP (ref 7–23)
BUN SERPL-MCNC: 20 MG/DL — SIGNIFICANT CHANGE UP (ref 7–23)
BUN SERPL-MCNC: 23 MG/DL — SIGNIFICANT CHANGE UP (ref 7–23)
CA-I BLD-SCNC: 0.96 MMOL/L — LOW (ref 1.15–1.33)
CA-I BLD-SCNC: 0.97 MMOL/L — LOW (ref 1.15–1.33)
CALCIUM SERPL-MCNC: 6.5 MG/DL — CRITICAL LOW (ref 8.4–10.5)
CALCIUM SERPL-MCNC: 6.7 MG/DL — LOW (ref 8.4–10.5)
CALCIUM SERPL-MCNC: 7.4 MG/DL — LOW (ref 8.4–10.5)
CARDIOLIPIN AB SER-ACNC: NEGATIVE — SIGNIFICANT CHANGE UP
CHLORIDE SERPL-SCNC: 102 MMOL/L — SIGNIFICANT CHANGE UP (ref 96–108)
CHLORIDE SERPL-SCNC: 103 MMOL/L — SIGNIFICANT CHANGE UP (ref 96–108)
CO2 SERPL-SCNC: 23 MMOL/L — SIGNIFICANT CHANGE UP (ref 22–31)
CO2 SERPL-SCNC: 24 MMOL/L — SIGNIFICANT CHANGE UP (ref 22–31)
CO2 SERPL-SCNC: 25 MMOL/L — SIGNIFICANT CHANGE UP (ref 22–31)
CREAT SERPL-MCNC: 0.68 MG/DL — SIGNIFICANT CHANGE UP (ref 0.5–1.3)
CREAT SERPL-MCNC: 0.87 MG/DL — SIGNIFICANT CHANGE UP (ref 0.5–1.3)
CREAT SERPL-MCNC: 0.93 MG/DL — SIGNIFICANT CHANGE UP (ref 0.5–1.3)
EGFR: 119 ML/MIN/1.73M2 — SIGNIFICANT CHANGE UP
EGFR: 84 ML/MIN/1.73M2 — SIGNIFICANT CHANGE UP
EGFR: 91 ML/MIN/1.73M2 — SIGNIFICANT CHANGE UP
EOSINOPHIL # BLD AUTO: 0 K/UL — SIGNIFICANT CHANGE UP (ref 0–0.5)
EOSINOPHIL # BLD AUTO: 0.01 K/UL — SIGNIFICANT CHANGE UP (ref 0–0.5)
EOSINOPHIL # BLD AUTO: 0.4 K/UL — SIGNIFICANT CHANGE UP (ref 0–0.5)
EOSINOPHIL NFR BLD AUTO: 0 % — SIGNIFICANT CHANGE UP (ref 0–6)
EOSINOPHIL NFR BLD AUTO: 1.7 % — SIGNIFICANT CHANGE UP (ref 0–6)
FIBRINOGEN PPP-MCNC: 245 MG/DL — SIGNIFICANT CHANGE UP (ref 200–445)
FIBRINOGEN PPP-MCNC: 258 MG/DL — SIGNIFICANT CHANGE UP (ref 200–445)
FIBRINOGEN PPP-MCNC: 297 MG/DL — SIGNIFICANT CHANGE UP (ref 200–445)
GLUCOSE SERPL-MCNC: 104 MG/DL — HIGH (ref 70–99)
GLUCOSE SERPL-MCNC: 117 MG/DL — HIGH (ref 70–99)
GLUCOSE SERPL-MCNC: 86 MG/DL — SIGNIFICANT CHANGE UP (ref 70–99)
HCT VFR BLD CALC: 24.5 % — LOW (ref 34.5–45)
HCT VFR BLD CALC: 25.4 % — LOW (ref 34.5–45)
HCT VFR BLD CALC: 25.6 % — LOW (ref 34.5–45)
HGB BLD-MCNC: 8.8 G/DL — LOW (ref 11.5–15.5)
HGB BLD-MCNC: 9 G/DL — LOW (ref 11.5–15.5)
HGB BLD-MCNC: 9.2 G/DL — LOW (ref 11.5–15.5)
IMM GRANULOCYTES NFR BLD AUTO: 5.4 % — HIGH (ref 0–0.9)
IMM GRANULOCYTES NFR BLD AUTO: 5.8 % — HIGH (ref 0–0.9)
INR BLD: 0.8 RATIO — LOW (ref 0.85–1.18)
INR BLD: 0.83 RATIO — LOW (ref 0.85–1.18)
INR BLD: 0.85 RATIO — SIGNIFICANT CHANGE UP (ref 0.85–1.18)
LDH SERPL L TO P-CCNC: 268 U/L — HIGH (ref 50–242)
LDH SERPL L TO P-CCNC: 322 U/L — HIGH (ref 50–242)
LDH SERPL L TO P-CCNC: 327 U/L — HIGH (ref 50–242)
LYMPHOCYTES # BLD AUTO: 15.8 % — SIGNIFICANT CHANGE UP (ref 13–44)
LYMPHOCYTES # BLD AUTO: 23.4 % — SIGNIFICANT CHANGE UP (ref 13–44)
LYMPHOCYTES # BLD AUTO: 23.5 % — SIGNIFICANT CHANGE UP (ref 13–44)
LYMPHOCYTES # BLD AUTO: 3.59 K/UL — HIGH (ref 1–3.3)
LYMPHOCYTES # BLD AUTO: 5.15 K/UL — HIGH (ref 1–3.3)
LYMPHOCYTES # BLD AUTO: 5.5 K/UL — HIGH (ref 1–3.3)
MANUAL SMEAR VERIFICATION: SIGNIFICANT CHANGE UP
MCHC RBC-ENTMCNC: 30.1 PG — SIGNIFICANT CHANGE UP (ref 27–34)
MCHC RBC-ENTMCNC: 30.6 PG — SIGNIFICANT CHANGE UP (ref 27–34)
MCHC RBC-ENTMCNC: 30.7 PG — SIGNIFICANT CHANGE UP (ref 27–34)
MCHC RBC-ENTMCNC: 35.4 GM/DL — SIGNIFICANT CHANGE UP (ref 32–36)
MCHC RBC-ENTMCNC: 35.9 GM/DL — SIGNIFICANT CHANGE UP (ref 32–36)
MCV RBC AUTO: 84.9 FL — SIGNIFICANT CHANGE UP (ref 80–100)
MCV RBC AUTO: 85 FL — SIGNIFICANT CHANGE UP (ref 80–100)
MCV RBC AUTO: 85.4 FL — SIGNIFICANT CHANGE UP (ref 80–100)
METAMYELOCYTES # FLD: 0.9 % — HIGH (ref 0–0)
MONOCYTES # BLD AUTO: 2 K/UL — HIGH (ref 0–0.9)
MONOCYTES # BLD AUTO: 2.53 K/UL — HIGH (ref 0–0.9)
MONOCYTES # BLD AUTO: 2.98 K/UL — HIGH (ref 0–0.9)
MONOCYTES NFR BLD AUTO: 10.8 % — SIGNIFICANT CHANGE UP (ref 2–14)
MONOCYTES NFR BLD AUTO: 13.5 % — SIGNIFICANT CHANGE UP (ref 2–14)
MONOCYTES NFR BLD AUTO: 8.8 % — SIGNIFICANT CHANGE UP (ref 2–14)
MYELOCYTES NFR BLD: 1.7 % — HIGH (ref 0–0)
NEUTROPHILS # BLD AUTO: 12.63 K/UL — HIGH (ref 1.8–7.4)
NEUTROPHILS # BLD AUTO: 13.49 K/UL — HIGH (ref 1.8–7.4)
NEUTROPHILS # BLD AUTO: 16.36 K/UL — HIGH (ref 1.8–7.4)
NEUTROPHILS NFR BLD AUTO: 57.3 % — SIGNIFICANT CHANGE UP (ref 43–77)
NEUTROPHILS NFR BLD AUTO: 57.6 % — SIGNIFICANT CHANGE UP (ref 43–77)
NEUTROPHILS NFR BLD AUTO: 71.9 % — SIGNIFICANT CHANGE UP (ref 43–77)
NRBC # BLD: 2 /100 WBCS — HIGH (ref 0–0)
NRBC # BLD: 4 /100 WBCS — HIGH (ref 0–0)
NRBC # BLD: 5 /100 — HIGH (ref 0–0)
PLAT MORPH BLD: NORMAL — SIGNIFICANT CHANGE UP
PLATELET # BLD AUTO: 156 K/UL — SIGNIFICANT CHANGE UP (ref 150–400)
PLATELET # BLD AUTO: 179 K/UL — SIGNIFICANT CHANGE UP (ref 150–400)
PLATELET # BLD AUTO: 182 K/UL — SIGNIFICANT CHANGE UP (ref 150–400)
POTASSIUM SERPL-MCNC: 4.4 MMOL/L — SIGNIFICANT CHANGE UP (ref 3.5–5.3)
POTASSIUM SERPL-MCNC: 4.5 MMOL/L — SIGNIFICANT CHANGE UP (ref 3.5–5.3)
POTASSIUM SERPL-MCNC: 4.7 MMOL/L — SIGNIFICANT CHANGE UP (ref 3.5–5.3)
POTASSIUM SERPL-SCNC: 4.4 MMOL/L — SIGNIFICANT CHANGE UP (ref 3.5–5.3)
POTASSIUM SERPL-SCNC: 4.5 MMOL/L — SIGNIFICANT CHANGE UP (ref 3.5–5.3)
POTASSIUM SERPL-SCNC: 4.7 MMOL/L — SIGNIFICANT CHANGE UP (ref 3.5–5.3)
PROT SERPL-MCNC: 3.9 G/DL — LOW (ref 6–8.3)
PROT SERPL-MCNC: 4 G/DL — LOW (ref 6–8.3)
PROT SERPL-MCNC: 4.4 G/DL — LOW (ref 6–8.3)
PROTHROM AB SERPL-ACNC: 8.9 SEC — LOW (ref 9.5–13)
PROTHROM AB SERPL-ACNC: 9 SEC — LOW (ref 9.5–13)
PROTHROM AB SERPL-ACNC: 9.2 SEC — LOW (ref 9.5–13)
RBC # BLD: 2.87 M/UL — LOW (ref 3.8–5.2)
RBC # BLD: 2.99 M/UL — LOW (ref 3.8–5.2)
RBC # BLD: 3.01 M/UL — LOW (ref 3.8–5.2)
RBC # FLD: 15.3 % — HIGH (ref 10.3–14.5)
RBC # FLD: 16.2 % — HIGH (ref 10.3–14.5)
RBC # FLD: 16.9 % — HIGH (ref 10.3–14.5)
RBC BLD AUTO: SIGNIFICANT CHANGE UP
RH IG SCN BLD-IMP: POSITIVE — SIGNIFICANT CHANGE UP
SODIUM SERPL-SCNC: 134 MMOL/L — LOW (ref 135–145)
URATE SERPL-MCNC: 6.2 MG/DL — SIGNIFICANT CHANGE UP (ref 2.5–7)
URATE SERPL-MCNC: 7.4 MG/DL — HIGH (ref 2.5–7)
URATE SERPL-MCNC: 7.9 MG/DL — HIGH (ref 2.5–7)
WBC # BLD: 22.04 K/UL — HIGH (ref 3.8–10.5)
WBC # BLD: 22.75 K/UL — HIGH (ref 3.8–10.5)
WBC # BLD: 23.41 K/UL — HIGH (ref 3.8–10.5)
WBC # FLD AUTO: 22.04 K/UL — HIGH (ref 3.8–10.5)
WBC # FLD AUTO: 22.75 K/UL — HIGH (ref 3.8–10.5)
WBC # FLD AUTO: 23.41 K/UL — HIGH (ref 3.8–10.5)

## 2023-11-21 PROCEDURE — 99232 SBSQ HOSP IP/OBS MODERATE 35: CPT | Mod: GC

## 2023-11-21 RX ORDER — CALCIUM GLUCONATE 100 MG/ML
2 VIAL (ML) INTRAVENOUS ONCE
Refills: 0 | Status: COMPLETED | OUTPATIENT
Start: 2023-11-21 | End: 2023-11-21

## 2023-11-21 RX ADMIN — Medication 975 MILLIGRAM(S): at 03:13

## 2023-11-21 RX ADMIN — TRAMADOL HYDROCHLORIDE 25 MILLIGRAM(S): 50 TABLET ORAL at 23:04

## 2023-11-21 RX ADMIN — TRAMADOL HYDROCHLORIDE 25 MILLIGRAM(S): 50 TABLET ORAL at 07:11

## 2023-11-21 RX ADMIN — Medication 975 MILLIGRAM(S): at 19:07

## 2023-11-21 RX ADMIN — Medication 30 MILLIGRAM(S): at 21:02

## 2023-11-21 RX ADMIN — Medication 975 MILLIGRAM(S): at 23:05

## 2023-11-21 RX ADMIN — Medication 975 MILLIGRAM(S): at 11:15

## 2023-11-21 RX ADMIN — Medication 975 MILLIGRAM(S): at 18:16

## 2023-11-21 RX ADMIN — Medication 975 MILLIGRAM(S): at 10:32

## 2023-11-21 RX ADMIN — TRAMADOL HYDROCHLORIDE 25 MILLIGRAM(S): 50 TABLET ORAL at 15:10

## 2023-11-21 RX ADMIN — TRAMADOL HYDROCHLORIDE 25 MILLIGRAM(S): 50 TABLET ORAL at 15:59

## 2023-11-21 RX ADMIN — Medication 200 GRAM(S): at 11:58

## 2023-11-21 RX ADMIN — Medication 50 MICROGRAM(S): at 06:43

## 2023-11-21 NOTE — PROGRESS NOTE ADULT - PROBLEM SELECTOR PLAN 2
Na of 124 but in the later labs it was 131 and last night 127  Urine Na was 9 and U osm 507.  Advised she restrict free water to 1 liter  Hold IVF and trend  Encourage po intake    PLAN:  Trend Urine osm daily and SNa
Improved hyponatremia  Advised she restrict free water to 1 liter  Hold IVF and trend  Encourage po intake    PLAN:  Trend Hct per OB   Na should normalize with continued pain control and solute intake

## 2023-11-21 NOTE — PROGRESS NOTE ADULT - ASSESSMENT
A/P: 32yo POD#2 s/p pLTCS @27w3d for sPEC with worsening Cr complicated by AUGUSTA, mild transaminitis and hyponatremia. on POD#1 H/h was noted to downtrend to 5/15 and CTAP showed rectus sheath hematoma.  Now sp 3uPRBC and 1u cryo with appropriate rise in H/h. Pt monitored in PACU overnight. Vital signs stable overnight.       #rectus sheath hematoma  - CTAP(11/20): Rectus sheath hematoma 4.4 x 13.6 x 14.3 cm.  Additional extraperitoneal hematoma deep to the anterior abdominal wall and anterior to  bladder measures 1.6 x 11.3 x 7.2 cm. No evidence of active arterial bleed. Trace ascites. Florence catheter with tip seen tenting the right wall of the bladder. Small b/l pleural effusions with associated bilateral lower lobe partial atelectasis.  -  3uPRBC and 1u cryo with appropriate rise in H/h.  - cont to trend H/h q4-6 hours    #hyponatremia  - improving  - Cont to trend BMP, urine osm, iCa  - appreciate Nephrology consult - concern for hypovolemic hyponatremia.     #AUGUSTA  - improving  - cont to trend HELLP labs, BNP  - appreciate Nephrology recs: trend urine osm, iCa, No objection to 2-3 days of NSAIDS PRN for pain. D/c IV fluids. Trend urine osm and SNa.     #sPEC  - s/p Magnesium for seizure prophylaxis x24h  - BP's well controlled overnight  - Cont Procardia 30 QD. S/p Pro 10/20 (11/17), Lab 20 (11/17)  - HELLP labs abnormal; f/u AM HELLP labs      -- transaminitis: 34/43->74/83-> 63/72->>43/66      -- Plts: 215-> 129-> ->>>221->>193->156      -- Cr: 0.55->>1.19->1.28->1.18 ->1.00      -- P/C: 8.1->1.2  - F/u 24h urine protein collection --> consider Lovenox for postpartum prophylaxis in setting of possible nephrotic range proteinuria  - cont to monitor for S/s sPEC  - holding NSAIDS     #postpartum   - Increase ambulation.  - Continue tylenol, tramadol for pain control.   - SCDs for DVT ppx, holding HSQ in setting of hematoma    Yani, PGY3 A/P: 34yo POD#2 s/p pLTCS @27w3d for sPEC with worsening Cr complicated by AUGUSTA, mild transaminitis and hyponatremia. on POD#1 H/h was noted to downtrend to 5/15 and CTAP showed rectus sheath hematoma.  Now sp 3uPRBC and 1u cryo with appropriate rise in H/h. Pt monitored in PACU overnight. Vital signs stable overnight.       #rectus sheath hematoma  - CTAP(11/20): Rectus sheath hematoma 4.4 x 13.6 x 14.3 cm.  Additional extraperitoneal hematoma deep to the anterior abdominal wall and anterior to  bladder measures 1.6 x 11.3 x 7.2 cm. No evidence of active arterial bleed. Trace ascites. Florence catheter with tip seen tenting the right wall of the bladder. Small b/l pleural effusions with associated bilateral lower lobe partial atelectasis.  -  3uPRBC and 1u cryo with appropriate rise in H/h.  - cont to trend H/h q4-6 hours    #hyponatremia  - improving  - Cont to trend BMP, urine osm, iCa  - appreciate Nephrology consult - concern for hypovolemic hyponatremia.     #[place]  - improving  - cont to trend HELLP labs, BNP  - appreciate Nephrology recs: trend urine osm, iCa, No objection to 2-3 days of NSAIDS PRN for pain. D/c IV fluids. Trend urine osm and SNa.     #sPEC  - s/p Magnesium for seizure prophylaxis x24h  - BP's well controlled overnight  - Cont Procardia 30 QD. S/p Pro 10/20 (11/17), Lab 20 (11/17)  - HELLP labs abnormal; f/u AM HELLP labs      -- transaminitis: 34/43->74/83-> 63/72->>43/66      -- Plts: 215-> 129-> ->>>221->>193->156      -- Cr: 0.55->>1.19->1.28->1.18 ->1.00      -- P/C: 8.1->1.2  - F/u 24h urine protein collection --> consider Lovenox for postpartum prophylaxis in setting of possible nephrotic range proteinuria  - cont to monitor for S/s sPEC  - holding NSAIDS     #postpartum   - Increase ambulation.  - Continue tylenol, tramadol for pain control.   - SCDs for DVT ppx, holding HSQ in setting of hematoma    Yani, PGY3

## 2023-11-21 NOTE — PROGRESS NOTE ADULT - ATTENDING COMMENTS
OB Attg:  Pt seen and assessed. I agree with above assessment and plan.  Pt hemodynamically stable. Cont to monitor closely. will cont to treat hematomas conservatively.  KRIS Luu MD

## 2023-11-21 NOTE — PROGRESS NOTE ADULT - ATTENDING COMMENTS
Patient seen and examined  Agree with Above assessment and plan  will advance diet and transfer to postpartum

## 2023-11-21 NOTE — PROGRESS NOTE ADULT - PROBLEM SELECTOR PLAN 1
Pt has baseline Scr of 0.55 and at the time of presentation 0.87 and gradually worsened to 1.28 and today it was 1.13.     PLAN:    Expected AUGUSTA improvement after the delivery.

## 2023-11-21 NOTE — PROGRESS NOTE ADULT - SUBJECTIVE AND OBJECTIVE BOX
OB Progress Note: LTCS, POD#2    S: 34yo POD#2 s/p pLTCS@27w GA for sPEC c/b dowtrending H/h on POD#1, found to have rectus sheath hematoma.   Pain is well controlled overnight. She is tolerating a clear liquid diet and passing flatus. Florence in place. No ambulation overnight. Denies CP/SOB. Denies lightheadedness/dizziness. Denies N/V.    O:  Vitals:  Vital Signs Last 24 Hrs  T(C): 36.9 (21 Nov 2023 01:45), Max: 37.4 (20 Nov 2023 21:29)  T(F): 98.4 (21 Nov 2023 01:45), Max: 99.3 (20 Nov 2023 21:29)  HR: 76 (21 Nov 2023 01:45) (62 - 76)  BP: 119/58 (21 Nov 2023 01:45) (92/57 - 136/72)  BP(mean): 83 (21 Nov 2023 01:45) (83 - 99)  RR: 18 (21 Nov 2023 01:45) (18 - 18)  SpO2: 96% (21 Nov 2023 01:45) (94% - 98%)    Parameters below as of 21 Nov 2023 01:45  Patient On (Oxygen Delivery Method): room air        MEDICATIONS  (STANDING):  acetaminophen     Tablet .. 975 milliGRAM(s) Oral every 6 hours  diphtheria/tetanus/pertussis (acellular) Vaccine (Adacel) 0.5 milliLiter(s) IntraMuscular once  levothyroxine 50 MICROGram(s) Oral daily  magnesium sulfate Infusion 1 Gm/Hr (25 mL/Hr) IV Continuous <Continuous>  NIFEdipine XL 30 milliGRAM(s) Oral daily  oxytocin Infusion 333.333 milliUNIT(s)/Min (1000 mL/Hr) IV Continuous <Continuous>  sodium zirconium cyclosilicate 10 Gram(s) Oral once      MEDICATIONS  (PRN):  diphenhydrAMINE 25 milliGRAM(s) Oral every 6 hours PRN Pruritus  lanolin Ointment 1 Application(s) Topical every 6 hours PRN Sore Nipples  magnesium hydroxide Suspension 30 milliLiter(s) Oral two times a day PRN Constipation  simethicone 80 milliGRAM(s) Chew every 4 hours PRN Gas  traMADol 25 milliGRAM(s) Oral every 8 hours PRN Moderate Pain (4 - 6)      Labs:  Blood type: B Positive  Rubella IgG: RPR:                           9.0<L>   22.04<H> >-----------< 156    ( 11-21 @ 03:00 )             25.4<L>                        8.9<L>   22.55<H> >-----------< 193    ( 11-20 @ 21:08 )             25.0<L>                        5.4<LL>   21.93<H> >-----------< 221    ( 11-20 @ 15:50 )             15.9<LL>                        5.4<LL>   21.81<H> >-----------< 209    ( 11-20 @ 14:33 )             15.7<LL>                        10.6<L>   21.72<H> >-----------< 200    ( 11-19 @ 16:29 )             30.6<L>                        10.9<L>   19.59<H> >-----------< 189    ( 11-19 @ 08:14 )             31.6<L>                        11.4<L>   16.07<H> >-----------< 212    ( 11-19 @ 00:52 )             32.5<L>                        10.8<L>   15.37<H> >-----------< 199    ( 11-18 @ 22:17 )             31.7<L>                        12.2   13.30<H> >-----------< 209    ( 11-18 @ 16:26 )             35.1                        11.6   13.30<H> >-----------< 210    ( 11-18 @ 12:20 )             33.7<L>                        12.1   9.63 >-----------< 129<L>    ( 11-18 @ 08:32 )             35.7                        7.5<L>   7.88 >-----------< 114<L>    ( 11-18 @ 06:58 )             24.0<L>    11-21-23 @ 03:00      134<L>  |  103  |  23  ----------------------------<  117<H>  4.5   |  24  |  0.93    11-20-23 @ 21:08      129<L>  |  99  |  28<H>  ----------------------------<  103<H>  5.2   |  22  |  1.00    11-20-23 @ 15:50      126<L>  |  98  |  30<H>  ----------------------------<  114<H>  5.7<H>   |  22  |  1.13    11-20-23 @ 11:30      126<L>  |  98  |  32<H>  ----------------------------<  131<H>  5.7<H>   |  20<L>  |  1.13    11-19-23 @ 16:29      127<L>  |  98  |  29<H>  ----------------------------<  127<H>  5.0   |  19<L>  |  1.13    11-19-23 @ 08:14      131<L>  |  101  |  27<H>  ----------------------------<  123<H>  4.8   |  18<L>  |  1.18    11-19-23 @ 00:53      130<L>  |  99  |  25<H>  ----------------------------<  153<H>  4.9   |  17<L>  |  1.28    11-18-23 @ 22:17      130<L>  |  100  |  24<H>  ----------------------------<  174<H>  4.8   |  16<L>  |  1.19    11-18-23 @ 16:14      132<L>  |  103  |  23  ----------------------------<  144<H>  5.0   |  17<L>  |  0.95    11-18-23 @ 12:20      131<L>  |  100  |  21  ----------------------------<  143<H>  4.9   |  17<L>  |  0.98    11-18-23 @ 08:28      124<L>  |  94<L>  |  18  ----------------------------<  108<H>  4.9   |  15<L>  |  0.88    11-18-23 @ 06:58      131<L>  |  101  |  20  ----------------------------<  118<H>  5.4<H>   |  15<L>  |  0.96        Ca    6.7<L>      21 Nov 2023 03:00  Ca    7.3<L>      20 Nov 2023 21:08  Ca    6.0<LL>      20 Nov 2023 15:50  Ca    5.7<LL>      20 Nov 2023 11:30  Ca    6.7<L>      19 Nov 2023 16:29  Ca    6.7<L>      19 Nov 2023 08:14  Ca    7.1<L>      19 Nov 2023 00:53  Ca    6.9<L>      18 Nov 2023 22:17  Ca    7.7<L>      18 Nov 2023 16:14  Ca    7.6<L>      18 Nov 2023 12:20  Ca    7.3<L>      18 Nov 2023 08:28  Ca    8.1<L>      18 Nov 2023 06:58  Mg     7.1<H>     11-19  Mg     7.3<H>     11-19  Mg     6.8<H>     11-19  Mg     7.1<H>     11-19  Mg     7.0<H>     11-18  Mg     7.0<H>     11-18  Mg     8.0<H>     11-18  Mg     29.6<HH>     11-18  Mg     9.2<HH>     11-18    TPro  3.9<L>  /  Alb  2.1<L>  /  TBili  0.2  /  DBili  x   /  AST  22  /  ALT  32  /  AlkPhos  104  11-21-23 @ 03:00  TPro  4.2<L>  /  Alb  2.2<L>  /  TBili  0.2  /  DBili  x   /  AST  29  /  ALT  36  /  AlkPhos  114  11-20-23 @ 21:08  TPro  3.9<L>  /  Alb  2.0<L>  /  TBili  0.1<L>  /  DBili  x   /  AST  28  /  ALT  36  /  AlkPhos  104  11-20-23 @ 15:50  TPro  3.6<L>  /  Alb  1.9<L>  /  TBili  <0.1<L>  /  DBili  x   /  AST  23  /  ALT  35  /  AlkPhos  95  11-20-23 @ 11:30  TPro  5.2<L>  /  Alb  2.9<L>  /  TBili  0.2  /  DBili  x   /  AST  41<H>  /  ALT  64<H>  /  AlkPhos  142<H>  11-19-23 @ 16:29  TPro  5.0<L>  /  Alb  2.6<L>  /  TBili  0.2  /  DBili  x   /  AST  43<H>  /  ALT  66<H>  /  AlkPhos  147<H>  11-19-23 @ 08:14  TPro  5.7<L>  /  Alb  3.0<L>  /  TBili  0.2  /  DBili  x   /  AST  49<H>  /  ALT  77<H>  /  AlkPhos  163<H>  11-19-23 @ 00:53  TPro  5.1<L>  /  Alb  2.5<L>  /  TBili  0.2  /  DBili  x   /  AST  47<H>  /  ALT  71<H>  /  AlkPhos  149<H>  11-18-23 @ 22:17  TPro  5.9<L>  /  Alb  3.0<L>  /  TBili  0.3  /  DBili  x   /  AST  62<H>  /  ALT  80<H>  /  AlkPhos  171<H>  11-18-23 @ 16:14  TPro  5.4<L>  /  Alb  2.9<L>  /  TBili  0.3  /  DBili  x   /  AST  60<H>  /  ALT  81<H>  /  AlkPhos  164<H>  11-18-23 @ 12:20  TPro  5.0<L>  /  Alb  2.3<L>  /  TBili  0.2  /  DBili  x   /  AST  63<H>  /  ALT  72<H>  /  AlkPhos  147<H>  11-18-23 @ 08:28  TPro  5.7<L>  /  Alb  2.9<L>  /  TBili  0.3  /  DBili  x   /  AST  71<H>  /  ALT  82<H>  /  AlkPhos  173<H>  11-18-23 @ 06:58      Physical Exam:  General: NAD  Abdomen: Soft, Mildly distended, appropriately tender, fundus firm. Incision C/D/I.    : Lochia wnl.  Florence catheter in place with clear urine.   Ext: 2+ pitting edema b/l, no erythema or tenderness.    OB Progress Note: LTCS, POD#2    S: 32yo POD#2 s/p pLTCS@27w GA for sPEC c/b dowtrending H/h on POD#1, found to have rectus sheath hematoma.   Pain is well controlled overnight. She is tolerating a clear liquid diet and passing flatus. Florence in place. No ambulation overnight. Denies CP/SOB. Denies lightheadedness/dizziness. Denies N/V.    O:  Vitals:  Vital Signs Last 24 Hrs  T(C): 36.9 (21 Nov 2023 01:45), Max: 37.4 (20 Nov 2023 21:29)  T(F): 98.4 (21 Nov 2023 01:45), Max: 99.3 (20 Nov 2023 21:29)  HR: 76 (21 Nov 2023 01:45) (62 - 76)  BP: 119/58 (21 Nov 2023 01:45) (92/57 - 136/72)  BP(mean): 83 (21 Nov 2023 01:45) (83 - 99)  RR: 18 (21 Nov 2023 01:45) (18 - 18)  SpO2: 96% (21 Nov 2023 01:45) (94% - 98%)    Parameters below as of 21 Nov 2023 01:45  Patient On (Oxygen Delivery Method): room air        MEDICATIONS  (STANDING):  acetaminophen     Tablet .. 975 milliGRAM(s) Oral every 6 hours  diphtheria/tetanus/pertussis (acellular) Vaccine (Adacel) 0.5 milliLiter(s) IntraMuscular once  levothyroxine 50 MICROGram(s) Oral daily  magnesium sulfate Infusion 1 Gm/Hr (25 mL/Hr) IV Continuous <Continuous>  NIFEdipine XL 30 milliGRAM(s) Oral daily  oxytocin Infusion 333.333 milliUNIT(s)/Min (1000 mL/Hr) IV Continuous <Continuous>  sodium zirconium cyclosilicate 10 Gram(s) Oral once      MEDICATIONS  (PRN):  diphenhydrAMINE 25 milliGRAM(s) Oral every 6 hours PRN Pruritus  lanolin Ointment 1 Application(s) Topical every 6 hours PRN Sore Nipples  magnesium hydroxide Suspension 30 milliLiter(s) Oral two times a day PRN Constipation  simethicone 80 milliGRAM(s) Chew every 4 hours PRN Gas  traMADol 25 milliGRAM(s) Oral every 8 hours PRN Moderate Pain (4 - 6)      Labs:  Blood type: B Positive  Rubella IgG: RPR:                           9.0<L>   22.04<H> >-----------< 156    ( 11-21 @ 03:00 )             25.4<L>                        8.9<L>   22.55<H> >-----------< 193    ( 11-20 @ 21:08 )             25.0<L>                        5.4<LL>   21.93<H> >-----------< 221    ( 11-20 @ 15:50 )             15.9<LL>                        5.4<LL>   21.81<H> >-----------< 209    ( 11-20 @ 14:33 )             15.7<LL>                        10.6<L>   21.72<H> >-----------< 200    ( 11-19 @ 16:29 )             30.6<L>                        10.9<L>   19.59<H> >-----------< 189    ( 11-19 @ 08:14 )             31.6<L>                        11.4<L>   16.07<H> >-----------< 212    ( 11-19 @ 00:52 )             32.5<L>                        10.8<L>   15.37<H> >-----------< 199    ( 11-18 @ 22:17 )             31.7<L>                        12.2   13.30<H> >-----------< 209    ( 11-18 @ 16:26 )             35.1                        11.6   13.30<H> >-----------< 210    ( 11-18 @ 12:20 )             33.7<L>                        12.1   9.63 >-----------< 129<L>    ( 11-18 @ 08:32 )             35.7                        7.5<L>   7.88 >-----------< 114<L>    ( 11-18 @ 06:58 )             24.0<L>    11-21-23 @ 03:00      134<L>  |  103  |  23  ----------------------------<  117<H>  4.5   |  24  |  0.93    11-20-23 @ 21:08      129<L>  |  99  |  28<H>  ----------------------------<  103<H>  5.2   |  22  |  1.00    11-20-23 @ 15:50      126<L>  |  98  |  30<H>  ----------------------------<  114<H>  5.7<H>   |  22  |  1.13    11-20-23 @ 11:30      126<L>  |  98  |  32<H>  ----------------------------<  131<H>  5.7<H>   |  20<L>  |  1.13    11-19-23 @ 16:29      127<L>  |  98  |  29<H>  ----------------------------<  127<H>  5.0   |  19<L>  |  1.13    11-19-23 @ 08:14      131<L>  |  101  |  27<H>  ----------------------------<  123<H>  4.8   |  18<L>  |  1.18    11-19-23 @ 00:53      130<L>  |  99  |  25<H>  ----------------------------<  153<H>  4.9   |  17<L>  |  1.28    11-18-23 @ 22:17      130<L>  |  100  |  24<H>  ----------------------------<  174<H>  4.8   |  16<L>  |  1.19    11-18-23 @ 16:14      132<L>  |  103  |  23  ----------------------------<  144<H>  5.0   |  17<L>  |  0.95    11-18-23 @ 12:20      131<L>  |  100  |  21  ----------------------------<  143<H>  4.9   |  17<L>  |  0.98    11-18-23 @ 08:28      124<L>  |  94<L>  |  18  ----------------------------<  108<H>  4.9   |  15<L>  |  0.88    11-18-23 @ 06:58      131<L>  |  101  |  20  ----------------------------<  118<H>  5.4<H>   |  15<L>  |  0.96        Ca    6.7<L>      21 Nov 2023 03:00  Ca    7.3<L>      20 Nov 2023 21:08  Ca    6.0<LL>      20 Nov 2023 15:50  Ca    5.7<LL>      20 Nov 2023 11:30  Ca    6.7<L>      19 Nov 2023 16:29  Ca    6.7<L>      19 Nov 2023 08:14  Ca    7.1<L>      19 Nov 2023 00:53  Ca    6.9<L>      18 Nov 2023 22:17  Ca    7.7<L>      18 Nov 2023 16:14  Ca    7.6<L>      18 Nov 2023 12:20  Ca    7.3<L>      18 Nov 2023 08:28  Ca    8.1<L>      18 Nov 2023 06:58  Mg     7.1<H>     11-19  Mg     7.3<H>     11-19  Mg     6.8<H>     11-19  Mg     7.1<H>     11-19  Mg     7.0<H>     11-18  Mg     7.0<H>     11-18  Mg     8.0<H>     11-18  Mg     29.6<HH>     11-18  Mg     9.2<HH>     11-18    TPro  3.9<L>  /  Alb  2.1<L>  /  TBili  0.2  /  DBili  x   /  AST  22  /  ALT  32  /  AlkPhos  104  11-21-23 @ 03:00  TPro  4.2<L>  /  Alb  2.2<L>  /  TBili  0.2  /  DBili  x   /  AST  29  /  ALT  36  /  AlkPhos  114  11-20-23 @ 21:08  TPro  3.9<L>  /  Alb  2.0<L>  /  TBili  0.1<L>  /  DBili  x   /  AST  28  /  ALT  36  /  AlkPhos  104  11-20-23 @ 15:50  TPro  3.6<L>  /  Alb  1.9<L>  /  TBili  <0.1<L>  /  DBili  x   /  AST  23  /  ALT  35  /  AlkPhos  95  11-20-23 @ 11:30  TPro  5.2<L>  /  Alb  2.9<L>  /  TBili  0.2  /  DBili  x   /  AST  41<H>  /  ALT  64<H>  /  AlkPhos  142<H>  11-19-23 @ 16:29  TPro  5.0<L>  /  Alb  2.6<L>  /  TBili  0.2  /  DBili  x   /  AST  43<H>  /  ALT  66<H>  /  AlkPhos  147<H>  11-19-23 @ 08:14  TPro  5.7<L>  /  Alb  3.0<L>  /  TBili  0.2  /  DBili  x   /  AST  49<H>  /  ALT  77<H>  /  AlkPhos  163<H>  11-19-23 @ 00:53  TPro  5.1<L>  /  Alb  2.5<L>  /  TBili  0.2  /  DBili  x   /  AST  47<H>  /  ALT  71<H>  /  AlkPhos  149<H>  11-18-23 @ 22:17  TPro  5.9<L>  /  Alb  3.0<L>  /  TBili  0.3  /  DBili  x   /  AST  62<H>  /  ALT  80<H>  /  AlkPhos  171<H>  11-18-23 @ 16:14  TPro  5.4<L>  /  Alb  2.9<L>  /  TBili  0.3  /  DBili  x   /  AST  60<H>  /  ALT  81<H>  /  AlkPhos  164<H>  11-18-23 @ 12:20  TPro  5.0<L>  /  Alb  2.3<L>  /  TBili  0.2  /  DBili  x   /  AST  63<H>  /  ALT  72<H>  /  AlkPhos  147<H>  11-18-23 @ 08:28  TPro  5.7<L>  /  Alb  2.9<L>  /  TBili  0.3  /  DBili  x   /  AST  71<H>  /  ALT  82<H>  /  AlkPhos  173<H>  11-18-23 @ 06:58      Physical Exam:  General: NAD  Abdomen: Soft, Mildly distended, appropriately tender, fundus firm. Incision C/D/I.    : Lochia wnl.  Florence catheter in place with clear urine.   Ext: 2+ pitting edema b/l, no erythema or tenderness.

## 2023-11-21 NOTE — PROGRESS NOTE ADULT - ASSESSMENT
A/P: 34yo POD#2 s/p LTCS @27wga for sPEC c/b 4.4x13.6x14.3 cm rectus sheath hematoma and 1.6x11.3 x 7.2 bladder flap hematoma s/p 3u PRBC + 1 Cryo with appropriate rise in H/H. Hyperkalemia corrected s/p Lokelma x1. Patient is hemodynamically stable and well appearing.    #Rectus and bladder sheath hematomas  - No active bleeding per CTAP-prelim, f/u final read  - H/H stable at 9/25.4, f/u 7a CBC  - Fibrinogen shira appropriately: 233->1u Cryo->245  - Abdominal exam stable from yesterday    #sPEC  - -130/50-70s overnight  - c/w Pro 30XL  - s/p 24hr postpartum Mg for seizure ppx  - Cr downtrendin.28->1.18->1.13->>0.93- Continue regular diet.  - No signs or symptoms of PEC at this time  - 24hr urine protein: 2646; consider Lovenox vs HSQ for DVT ppx    #[place]  - Hyperkalemia and hyponatremia resolving, s/p Lokelam 10mg x1  - Cr downtrending, f/u AM labs  - Will confirm with nephro no objections to NSAIDS  - Urine osm: 252, urine lytes wnl    #Postpartum  - UOP adequate, d/c Florence once AM labs result  - Encourage ambulation today  - Reg diet  - SCD  - Continue Tylenol, Tramadol for pain control. Consider adding Motrin per ubaldo Burdick PGY3 A/P: 32yo POD#2 s/p LTCS @27wga for sPEC c/b 4.4x13.6x14.3 cm rectus sheath hematoma and 1.6x11.3 x 7.2 bladder flap hematoma s/p 3u PRBC + 1 Cryo with appropriate rise in H/H. Hyperkalemia corrected s/p Lokelma x1. Patient is hemodynamically stable and well appearing.    #Rectus and bladder sheath hematomas  - No active bleeding per CTAP-prelim, f/u final read  - H/H stable at 9/25.4, f/u 7a CBC  - Fibrinogen shira appropriately: 233->1u Cryo->245  - Abdominal exam stable from yesterday    #sPEC  - -130/50-70s overnight  - c/w Pro 30XL  - s/p 24hr postpartum Mg for seizure ppx  - Cr downtrendin.28->1.18->1.13->>0.93- Continue regular diet.  - No signs or symptoms of PEC at this time  - 24hr urine protein: 2646; consider Lovenox vs HSQ for DVT ppx    #[place]  - Hyperkalemia and hyponatremia resolving, s/p Lokelam 10mg x1  - Cr downtrending, f/u AM labs  - Will confirm with nephro no objections to NSAIDS  - Urine osm: 252, urine lytes wnl    #Postpartum  - UOP adequate, d/c Florence once AM labs result  - Encourage ambulation today  - Reg diet  - SCD  - Continue Tylenol, Tramadol for pain control. Consider adding Motrin per ubaldo Burdick PGY3 Abdominal Pain, N/V/D

## 2023-11-21 NOTE — PROGRESS NOTE ADULT - SUBJECTIVE AND OBJECTIVE BOX
[facility] Division of Kidney Diseases & Hypertension  FOLLOW UP NOTE  --------------------------------------------------------------------------------  Chief Complaint:    24 hour events/subjective:    events from overnight noted  was given CT with IV and had rectus sheath hematoma. transfused PRBC  Given lokelma, calcium, and lasix for hyperkalemia    PAST HISTORY  --------------------------------------------------------------------------------  No significant changes to PMH, PSH, FHx, SHx, unless otherwise noted    ALLERGIES & MEDICATIONS  --------------------------------------------------------------------------------  Allergies    No Known Allergies    Intolerances      Standing Inpatient Medications  acetaminophen     Tablet .. 975 milliGRAM(s) Oral every 6 hours  diphtheria/tetanus/pertussis (acellular) Vaccine (Adacel) 0.5 milliLiter(s) IntraMuscular once  levothyroxine 50 MICROGram(s) Oral daily  magnesium sulfate Infusion 1 Gm/Hr IV Continuous <Continuous>  NIFEdipine XL 30 milliGRAM(s) Oral daily  oxytocin Infusion 333.333 milliUNIT(s)/Min IV Continuous <Continuous>  sodium zirconium cyclosilicate 10 Gram(s) Oral once    PRN Inpatient Medications  diphenhydrAMINE 25 milliGRAM(s) Oral every 6 hours PRN  lanolin Ointment 1 Application(s) Topical every 6 hours PRN  magnesium hydroxide Suspension 30 milliLiter(s) Oral two times a day PRN  simethicone 80 milliGRAM(s) Chew every 4 hours PRN  traMADol 25 milliGRAM(s) Oral every 8 hours PRN      REVIEW OF SYSTEMS  --------------------------------------------------------------------------------  All other systems were reviewed and are negative, except as noted.    VITALS/PHYSICAL EXAM  --------------------------------------------------------------------------------  T(C): 36.7 (11-21-23 @ 10:10), Max: 37.4 (11-20-23 @ 21:29)  HR: 82 (11-21-23 @ 10:10) (66 - 82)  BP: 146/80 (11-21-23 @ 10:10) (112/74 - 146/80)  RR: 18 (11-21-23 @ 10:10) (18 - 18)  SpO2: 95% (11-21-23 @ 10:10) (94% - 98%)  Wt(kg): --        11-20-23 @ 07:01  -  11-21-23 @ 07:00  --------------------------------------------------------  IN: 1075 mL / OUT: 5375 mL / NET: -4300 mL    11-21-23 @ 07:01  -  11-21-23 @ 12:59  --------------------------------------------------------  IN: 0 mL / OUT: 2100 mL / NET: -2100 mL      Physical Exam:  	Gen: NAD  	Pulm: CTA B/L  	CV: RRR, S1S2  	Abd: +BS,  	UE: Warm, FROM  	LE: Warm, FROM,  edema  	Neuro: No focal deficits  	Psych: Normal affect and mood  	Skin: Warm, without rashes    LABS/STUDIES  --------------------------------------------------------------------------------              9.2    22.75 >-----------<  179      [11-21-23 @ 08:01]              25.6     134  |  103  |  20  ----------------------------<  86      [11-21-23 @ 08:01]  4.4   |  25  |  0.87        Ca     6.5     [11-21-23 @ 08:01]      iCa    0.96     [11-21 @ 07:58]      Mg     7.1     [11-19-23 @ 22:44]    TPro  4.0  /  Alb  2.1  /  TBili  0.2  /  DBili  x   /  AST  21  /  ALT  31  /  AlkPhos  107  [11-21-23 @ 08:01]    PT/INR: PT 9.2  , INR 0.83       [11-21-23 @ 08:01]  PTT: 31.2       [11-21-23 @ 08:01]    Uric acid 7.4      [11-21-23 @ 08:01]        [11-21-23 @ 08:01]    Creatinine Trend:  SCr 0.87 [11-21 @ 08:01]  SCr 0.93 [11-21 @ 03:00]  SCr 1.00 [11-20 @ 21:08]  SCr 1.13 [11-20 @ 15:50]  SCr 1.13 [11-20 @ 11:30]    Urinalysis - [11-21-23 @ 08:01]      Color  / Appearance  / SG  / pH       Gluc 86 / Ketone   / Bili  / Urobili        Blood  / Protein  / Leuk Est  / Nitrite       RBC  / WBC  / Hyaline  / Gran  / Sq Epi  / Non Sq Epi  / Bacteria     Urine Creatinine 109      [11-18-23 @ 22:17]  Urine Protein 126      [11-18-23 @ 22:17]  Urine Sodium 106      [11-20-23 @ 20:44]  Urine Potassium 12      [11-20-23 @ 20:44]  Urine Osmolality 252      [11-20-23 @ 20:44]         [facility] Division of Kidney Diseases & Hypertension  FOLLOW UP NOTE  --------------------------------------------------------------------------------  Chief Complaint:    24 hour events/subjective:    events from overnight noted  was given CT with IV and had rectus sheath hematoma. transfused PRBC  Given lokelma, calcium, and lasix for hyperkalemia    PAST HISTORY  --------------------------------------------------------------------------------  No significant changes to PMH, PSH, FHx, SHx, unless otherwise noted    ALLERGIES & MEDICATIONS  --------------------------------------------------------------------------------  Allergies    No Known Allergies    Intolerances      Standing Inpatient Medications  acetaminophen     Tablet .. 975 milliGRAM(s) Oral every 6 hours  diphtheria/tetanus/pertussis (acellular) Vaccine (Adacel) 0.5 milliLiter(s) IntraMuscular once  levothyroxine 50 MICROGram(s) Oral daily  magnesium sulfate Infusion 1 Gm/Hr IV Continuous <Continuous>  NIFEdipine XL 30 milliGRAM(s) Oral daily  oxytocin Infusion 333.333 milliUNIT(s)/Min IV Continuous <Continuous>  sodium zirconium cyclosilicate 10 Gram(s) Oral once    PRN Inpatient Medications  diphenhydrAMINE 25 milliGRAM(s) Oral every 6 hours PRN  lanolin Ointment 1 Application(s) Topical every 6 hours PRN  magnesium hydroxide Suspension 30 milliLiter(s) Oral two times a day PRN  simethicone 80 milliGRAM(s) Chew every 4 hours PRN  traMADol 25 milliGRAM(s) Oral every 8 hours PRN      REVIEW OF SYSTEMS  --------------------------------------------------------------------------------  All other systems were reviewed and are negative, except as noted.    VITALS/PHYSICAL EXAM  --------------------------------------------------------------------------------  T(C): 36.7 (11-21-23 @ 10:10), Max: 37.4 (11-20-23 @ 21:29)  HR: 82 (11-21-23 @ 10:10) (66 - 82)  BP: 146/80 (11-21-23 @ 10:10) (112/74 - 146/80)  RR: 18 (11-21-23 @ 10:10) (18 - 18)  SpO2: 95% (11-21-23 @ 10:10) (94% - 98%)  Wt(kg): --        11-20-23 @ 07:01  -  11-21-23 @ 07:00  --------------------------------------------------------  IN: 1075 mL / OUT: 5375 mL / NET: -4300 mL    11-21-23 @ 07:01  -  11-21-23 @ 12:59  --------------------------------------------------------  IN: 0 mL / OUT: 2100 mL / NET: -2100 mL      Physical Exam:  	Gen: NAD  	Pulm: CTA B/L  	CV: RRR, S1S2  	Abd: +BS,  	UE: Warm, FROM  	LE: Warm, FROM,  edema  	Neuro: No focal deficits  	Psych: Normal affect and mood  	Skin: Warm, without rashes    LABS/STUDIES  --------------------------------------------------------------------------------              9.2    22.75 >-----------<  179      [11-21-23 @ 08:01]              25.6     134  |  103  |  20  ----------------------------<  86      [11-21-23 @ 08:01]  4.4   |  25  |  0.87        Ca     6.5     [11-21-23 @ 08:01]      iCa    0.96     [11-21 @ 07:58]      Mg     7.1     [11-19-23 @ 22:44]    TPro  4.0  /  Alb  2.1  /  TBili  0.2  /  DBili  x   /  AST  21  /  ALT  31  /  AlkPhos  107  [11-21-23 @ 08:01]    PT/INR: PT 9.2  , INR 0.83       [11-21-23 @ 08:01]  PTT: 31.2       [11-21-23 @ 08:01]    Uric acid 7.4      [11-21-23 @ 08:01]        [11-21-23 @ 08:01]    Creatinine Trend:  SCr 0.87 [11-21 @ 08:01]  SCr 0.93 [11-21 @ 03:00]  SCr 1.00 [11-20 @ 21:08]  SCr 1.13 [11-20 @ 15:50]  SCr 1.13 [11-20 @ 11:30]    Urinalysis - [11-21-23 @ 08:01]      Color  / Appearance  / SG  / pH       Gluc 86 / Ketone   / Bili  / Urobili        Blood  / Protein  / Leuk Est  / Nitrite       RBC  / WBC  / Hyaline  / Gran  / Sq Epi  / Non Sq Epi  / Bacteria     Urine Creatinine 109      [11-18-23 @ 22:17]  Urine Protein 126      [11-18-23 @ 22:17]  Urine Sodium 106      [11-20-23 @ 20:44]  Urine Potassium 12      [11-20-23 @ 20:44]  Urine Osmolality 252      [11-20-23 @ 20:44]         [facility] Division of Kidney Diseases & Hypertension  FOLLOW UP NOTE  --------------------------------------------------------------------------------  Chief Complaint:    24 hour events/subjective:    events from overnight noted  was given CT with IV and had rectus sheath hematoma. transfused PRBC  Given lokelma, calcium, and lasix for hyperkalemia    PAST HISTORY  --------------------------------------------------------------------------------  No significant changes to PMH, PSH, FHx, SHx, unless otherwise noted    ALLERGIES & MEDICATIONS  --------------------------------------------------------------------------------  Allergies    No Known Allergies    Intolerances      Standing Inpatient Medications  acetaminophen     Tablet .. 975 milliGRAM(s) Oral every 6 hours  diphtheria/tetanus/pertussis (acellular) Vaccine (Adacel) 0.5 milliLiter(s) IntraMuscular once  levothyroxine 50 MICROGram(s) Oral daily  magnesium sulfate Infusion 1 Gm/Hr IV Continuous <Continuous>  NIFEdipine XL 30 milliGRAM(s) Oral daily  oxytocin Infusion 333.333 milliUNIT(s)/Min IV Continuous <Continuous>  sodium zirconium cyclosilicate 10 Gram(s) Oral once    PRN Inpatient Medications  diphenhydrAMINE 25 milliGRAM(s) Oral every 6 hours PRN  lanolin Ointment 1 Application(s) Topical every 6 hours PRN  magnesium hydroxide Suspension 30 milliLiter(s) Oral two times a day PRN  simethicone 80 milliGRAM(s) Chew every 4 hours PRN  traMADol 25 milliGRAM(s) Oral every 8 hours PRN      REVIEW OF SYSTEMS  --------------------------------------------------------------------------------  All other systems were reviewed and are negative, except as noted.    VITALS/PHYSICAL EXAM  --------------------------------------------------------------------------------  T(C): 36.7 (11-21-23 @ 10:10), Max: 37.4 (11-20-23 @ 21:29)  HR: 82 (11-21-23 @ 10:10) (66 - 82)  BP: 146/80 (11-21-23 @ 10:10) (112/74 - 146/80)  RR: 18 (11-21-23 @ 10:10) (18 - 18)  SpO2: 95% (11-21-23 @ 10:10) (94% - 98%)  Wt(kg): --        11-20-23 @ 07:01  -  11-21-23 @ 07:00  --------------------------------------------------------  IN: 1075 mL / OUT: 5375 mL / NET: -4300 mL    11-21-23 @ 07:01  -  11-21-23 @ 12:59  --------------------------------------------------------  IN: 0 mL / OUT: 2100 mL / NET: -2100 mL      Physical Exam:  	Gen: NAD  	Pulm: CTA B/L  	CV: RRR, S1S2  	Abd: +BS,  	UE: Warm, FROM  	LE: Warm, FROM,  edema  	Neuro: No focal deficits  	Psych: Normal affect and mood  	Skin: Warm, without rashes    LABS/STUDIES  --------------------------------------------------------------------------------              9.2    22.75 >-----------<  179      [11-21-23 @ 08:01]              25.6     134  |  103  |  20  ----------------------------<  86      [11-21-23 @ 08:01]  4.4   |  25  |  0.87        Ca     6.5     [11-21-23 @ 08:01]      iCa    0.96     [11-21 @ 07:58]      Mg     7.1     [11-19-23 @ 22:44]    TPro  4.0  /  Alb  2.1  /  TBili  0.2  /  DBili  x   /  AST  21  /  ALT  31  /  AlkPhos  107  [11-21-23 @ 08:01]    PT/INR: PT 9.2  , INR 0.83       [11-21-23 @ 08:01]  PTT: 31.2       [11-21-23 @ 08:01]    Uric acid 7.4      [11-21-23 @ 08:01]        [11-21-23 @ 08:01]    Creatinine Trend:  SCr 0.87 [11-21 @ 08:01]  SCr 0.93 [11-21 @ 03:00]  SCr 1.00 [11-20 @ 21:08]  SCr 1.13 [11-20 @ 15:50]  SCr 1.13 [11-20 @ 11:30]    Urinalysis - [11-21-23 @ 08:01]      Color  / Appearance  / SG  / pH       Gluc 86 / Ketone   / Bili  / Urobili        Blood  / Protein  / Leuk Est  / Nitrite       RBC  / WBC  / Hyaline  / Gran  / Sq Epi  / Non Sq Epi  / Bacteria     Urine Creatinine 109      [11-18-23 @ 22:17]  Urine Protein 126      [11-18-23 @ 22:17]  Urine Sodium 106      [11-20-23 @ 20:44]  Urine Potassium 12      [11-20-23 @ 20:44]  Urine Osmolality 252      [11-20-23 @ 20:44]

## 2023-11-21 NOTE — PROGRESS NOTE ADULT - SUBJECTIVE AND OBJECTIVE BOX
OB Progress Note: LTCS, POD#2    S: 32yo POD#2 s/p LTCS for sPEC c/b large rectus sheath hematoma and extraperitoneal hematoma s/p 3u PRBC and 1u cryo. Pain is reasonably controlled with Tylenol and Tramadol. She is tolerating clears and passing flatus. Patient reports feeling much better and less swollen. Denies CP/SOB. Denies lightheadedness, dizziness, or N/V.    O:  Vitals:  Vital Signs Last 24 Hrs  T(C): 36.9 (21 Nov 2023 01:45), Max: 37.4 (20 Nov 2023 21:29)  T(F): 98.4 (21 Nov 2023 01:45), Max: 99.3 (20 Nov 2023 21:29)  HR: 76 (21 Nov 2023 01:45) (66 - 76)  BP: 119/58 (21 Nov 2023 01:45) (100/69 - 136/72)  BP(mean): 83 (21 Nov 2023 01:45) (83 - 99)  RR: 18 (21 Nov 2023 01:45) (18 - 18)  SpO2: 96% (21 Nov 2023 01:45) (94% - 98%)    Parameters below as of 21 Nov 2023 01:45  Patient On (Oxygen Delivery Method): room air        MEDICATIONS  (STANDING):  acetaminophen     Tablet .. 975 milliGRAM(s) Oral every 6 hours  diphtheria/tetanus/pertussis (acellular) Vaccine (Adacel) 0.5 milliLiter(s) IntraMuscular once  levothyroxine 50 MICROGram(s) Oral daily  magnesium sulfate Infusion 1 Gm/Hr (25 mL/Hr) IV Continuous <Continuous>  NIFEdipine XL 30 milliGRAM(s) Oral daily  oxytocin Infusion 333.333 milliUNIT(s)/Min (1000 mL/Hr) IV Continuous <Continuous>  sodium zirconium cyclosilicate 10 Gram(s) Oral once      MEDICATIONS  (PRN):  diphenhydrAMINE 25 milliGRAM(s) Oral every 6 hours PRN Pruritus  lanolin Ointment 1 Application(s) Topical every 6 hours PRN Sore Nipples  magnesium hydroxide Suspension 30 milliLiter(s) Oral two times a day PRN Constipation  simethicone 80 milliGRAM(s) Chew every 4 hours PRN Gas  traMADol 25 milliGRAM(s) Oral every 8 hours PRN Moderate Pain (4 - 6)      Labs:  Blood type: B Positive  Rubella IgG: RPR:                           9.0<L>   22.04<H> >-----------< 156    ( 11-21 @ 03:00 )             25.4<L>                        8.9<L>   22.55<H> >-----------< 193    ( 11-20 @ 21:08 )             25.0<L>                        5.4<LL>   21.93<H> >-----------< 221    ( 11-20 @ 15:50 )             15.9<LL>                        5.4<LL>   21.81<H> >-----------< 209    ( 11-20 @ 14:33 )             15.7<LL>                 11-21-23 @ 03:00      134<L>  |  103  |  23  ----------------------------<  117<H>  4.5   |  24  |  0.93    11-20-23 @ 21:08      129<L>  |  99  |  28<H>  ----------------------------<  103<H>  5.2   |  22  |  1.00    11-20-23 @ 15:50      126<L>  |  98  |  30<H>  ----------------------------<  114<H>  5.7<H>   |  22  |  1.13    11-20-23 @ 11:30          Ca    6.7<L>      21 Nov 2023 03:00  Ca    7.3<L>      20 Nov 2023 21:08  Ca    6.0<LL>      20 Nov 2023 15:50  Ca    5.7<LL>      20 Nov 2023 11:30  Mg     7.1<H>     11-19  Mg     7.3<H>     11-19  Mg     6.8<H>     11-19  Mg     7.1<H>     11-19      TPro  3.9<L>  /  Alb  2.1<L>  /  TBili  0.2  /  DBili  x   /  AST  22  /  ALT  32  /  AlkPhos  104  11-21-23 @ 03:00  TPro  4.2<L>  /  Alb  2.2<L>  /  TBili  0.2  /  DBili  x   /  AST  29  /  ALT  36  /  AlkPhos  114  11-20-23 @ 21:08  TPro  3.9<L>  /  Alb  2.0<L>  /  TBili  0.1<L>  /  DBili  x   /  AST  28  /  ALT  36  /  AlkPhos  104  11-20-23 @ 15:50  TPro  3.6<L>  /  Alb  1.9<L>  /  TBili  <0.1<L>  /  DBili  x   /  AST  23  /  ALT  35  /  AlkPhos  95  11-20-23 @ 11:30        PE:  General: NAD  CV: RRR  Pulm: CTAB  Abdomen: Soft, moderately distended, incision c/d/i.  Extremities: No calf tenderness, 2+ non pitting edema in upper and lower extremities

## 2023-11-21 NOTE — PROGRESS NOTE ADULT - ATTENDING COMMENTS
32 y/o F with Preeclampsia with AUGUSTA and elevated BP. Was found to have hyponatremia of 124, last Na 134 much improved  1.  Preeclampsia-- proteinuria, low Cata, hypertension, now improved Cr   2.  Hypertension--Mg--> BP controlled.   3.  Hyponatremia--Likely ADH mediated. Free water restrict 1 liter. Encourage po intake. Control pain and nausea. She has poor reaction to narcotics    I have no objection to limited 2-3 day of NSAIDs as needed for pain. Trend Cr  Encourage po intake    Sadie Kauffman MD  Off: [phone]  contact me on teams    (After 5 pm or on weekends please page the on-call fellow/attending, can check [email or web address] for schedule. Login is torrie montanez, schedule under [facility] medicine, psych, derm) 32 y/o F with Preeclampsia with AUGUSTA and elevated BP. Was found to have hyponatremia of 124, last Na 134 much improved  1.  Preeclampsia-- proteinuria, low Cata, hypertension, now improved Cr   2.  Hypertension--Mg--> BP controlled.   3.  Hyponatremia--Likely ADH mediated. Free water restrict 1 liter. Encourage po intake. Control pain and nausea. She has poor reaction to narcotics    I have no objection to limited 2-3 day of NSAIDs as needed for pain. Trend Cr  Encourage po intake    Sadie Kauffman MD  Off: [phone]  contact me on teams    (After 5 pm or on weekends please page the on-call fellow/attending, can check [email or web address] for schedule. Login is torrie montanez, schedule under [facility] medicine, psych, derm) 34 y/o F with Preeclampsia with AUGUSTA and elevated BP. Was found to have hyponatremia of 124, last Na 134 much improved  1.  Preeclampsia-- proteinuria, low Cata, hypertension, now improved Cr   2.  Hypertension--Mg--> BP controlled.   3.  Hyponatremia--Likely ADH mediated. Free water restrict 1 liter. Encourage po intake. Control pain and nausea. She has poor reaction to narcotics    I have no objection to limited 2-3 day of NSAIDs as needed for pain. Trend Cr  Encourage po intake    Sadie Kauffman MD  Off: [phone]  contact me on teams    (After 5 pm or on weekends please page the on-call fellow/attending, can check [email or web address] for schedule. Login is torrie montanez, schedule under [facility] medicine, psych, derm)

## 2023-11-22 LAB
-  CLINDAMYCIN: SIGNIFICANT CHANGE UP
-  PENICILLIN: SIGNIFICANT CHANGE UP
-  VANCOMYCIN: SIGNIFICANT CHANGE UP
ALBUMIN SERPL ELPH-MCNC: 2.1 G/DL — LOW (ref 3.3–5)
ALP SERPL-CCNC: 123 U/L — HIGH (ref 40–120)
ALT FLD-CCNC: 28 U/L — SIGNIFICANT CHANGE UP (ref 10–45)
ANION GAP SERPL CALC-SCNC: 9 MMOL/L — SIGNIFICANT CHANGE UP (ref 5–17)
APTT BLD: 25.7 SEC — SIGNIFICANT CHANGE UP (ref 24.5–35.6)
AST SERPL-CCNC: 25 U/L — SIGNIFICANT CHANGE UP (ref 10–40)
BASOPHILS # BLD AUTO: 0.16 K/UL — SIGNIFICANT CHANGE UP (ref 0–0.2)
BASOPHILS NFR BLD AUTO: 0.7 % — SIGNIFICANT CHANGE UP (ref 0–2)
BILIRUB SERPL-MCNC: 0.2 MG/DL — SIGNIFICANT CHANGE UP (ref 0.2–1.2)
BUN SERPL-MCNC: 11 MG/DL — SIGNIFICANT CHANGE UP (ref 7–23)
CALCIUM SERPL-MCNC: 7.9 MG/DL — LOW (ref 8.4–10.5)
CHLORIDE SERPL-SCNC: 102 MMOL/L — SIGNIFICANT CHANGE UP (ref 96–108)
CO2 SERPL-SCNC: 24 MMOL/L — SIGNIFICANT CHANGE UP (ref 22–31)
CREAT SERPL-MCNC: 0.64 MG/DL — SIGNIFICANT CHANGE UP (ref 0.5–1.3)
CULTURE RESULTS: ABNORMAL
EGFR: 120 ML/MIN/1.73M2 — SIGNIFICANT CHANGE UP
EOSINOPHIL # BLD AUTO: 1.12 K/UL — HIGH (ref 0–0.5)
EOSINOPHIL NFR BLD AUTO: 4.9 % — SIGNIFICANT CHANGE UP (ref 0–6)
FIBRINOGEN PPP-MCNC: 326 MG/DL — SIGNIFICANT CHANGE UP (ref 200–445)
GLUCOSE SERPL-MCNC: 89 MG/DL — SIGNIFICANT CHANGE UP (ref 70–99)
HCT VFR BLD CALC: 24.9 % — LOW (ref 34.5–45)
HGB BLD-MCNC: 8.6 G/DL — LOW (ref 11.5–15.5)
IMM GRANULOCYTES NFR BLD AUTO: 6.8 % — HIGH (ref 0–0.9)
INR BLD: 0.78 RATIO — LOW (ref 0.85–1.18)
LDH SERPL L TO P-CCNC: 269 U/L — HIGH (ref 50–242)
LYMPHOCYTES # BLD AUTO: 21.1 % — SIGNIFICANT CHANGE UP (ref 13–44)
LYMPHOCYTES # BLD AUTO: 4.78 K/UL — HIGH (ref 1–3.3)
MCHC RBC-ENTMCNC: 30.4 PG — SIGNIFICANT CHANGE UP (ref 27–34)
MCHC RBC-ENTMCNC: 34.5 GM/DL — SIGNIFICANT CHANGE UP (ref 32–36)
MCV RBC AUTO: 88 FL — SIGNIFICANT CHANGE UP (ref 80–100)
METHOD TYPE: SIGNIFICANT CHANGE UP
MONOCYTES # BLD AUTO: 1.86 K/UL — HIGH (ref 0–0.9)
MONOCYTES NFR BLD AUTO: 8.2 % — SIGNIFICANT CHANGE UP (ref 2–14)
NEUTROPHILS # BLD AUTO: 13.2 K/UL — HIGH (ref 1.8–7.4)
NEUTROPHILS NFR BLD AUTO: 58.3 % — SIGNIFICANT CHANGE UP (ref 43–77)
NRBC # BLD: 2 /100 WBCS — HIGH (ref 0–0)
ORGANISM # SPEC MICROSCOPIC CNT: ABNORMAL
PLATELET # BLD AUTO: 223 K/UL — SIGNIFICANT CHANGE UP (ref 150–400)
POTASSIUM SERPL-MCNC: 4.6 MMOL/L — SIGNIFICANT CHANGE UP (ref 3.5–5.3)
POTASSIUM SERPL-SCNC: 4.6 MMOL/L — SIGNIFICANT CHANGE UP (ref 3.5–5.3)
PROT SERPL-MCNC: 4.4 G/DL — LOW (ref 6–8.3)
PROTHROM AB SERPL-ACNC: 8.6 SEC — LOW (ref 9.5–13)
RBC # BLD: 2.83 M/UL — LOW (ref 3.8–5.2)
RBC # FLD: 16.7 % — HIGH (ref 10.3–14.5)
SODIUM SERPL-SCNC: 135 MMOL/L — SIGNIFICANT CHANGE UP (ref 135–145)
SPECIMEN SOURCE: SIGNIFICANT CHANGE UP
URATE SERPL-MCNC: 5.7 MG/DL — SIGNIFICANT CHANGE UP (ref 2.5–7)
WBC # BLD: 22.67 K/UL — HIGH (ref 3.8–10.5)
WBC # FLD AUTO: 22.67 K/UL — HIGH (ref 3.8–10.5)

## 2023-11-22 RX ORDER — HEPARIN SODIUM 5000 [USP'U]/ML
5000 INJECTION INTRAVENOUS; SUBCUTANEOUS EVERY 12 HOURS
Refills: 0 | Status: DISCONTINUED | OUTPATIENT
Start: 2023-11-22 | End: 2023-11-23

## 2023-11-22 RX ORDER — IBUPROFEN 200 MG
600 TABLET ORAL EVERY 6 HOURS
Refills: 0 | Status: DISCONTINUED | OUTPATIENT
Start: 2023-11-22 | End: 2023-11-23

## 2023-11-22 RX ADMIN — Medication 600 MILLIGRAM(S): at 14:09

## 2023-11-22 RX ADMIN — Medication 50 MICROGRAM(S): at 06:13

## 2023-11-22 RX ADMIN — Medication 975 MILLIGRAM(S): at 00:47

## 2023-11-22 RX ADMIN — Medication 975 MILLIGRAM(S): at 12:15

## 2023-11-22 RX ADMIN — Medication 30 MILLIGRAM(S): at 20:46

## 2023-11-22 RX ADMIN — Medication 975 MILLIGRAM(S): at 18:40

## 2023-11-22 RX ADMIN — Medication 975 MILLIGRAM(S): at 17:49

## 2023-11-22 RX ADMIN — Medication 600 MILLIGRAM(S): at 15:00

## 2023-11-22 RX ADMIN — TRAMADOL HYDROCHLORIDE 25 MILLIGRAM(S): 50 TABLET ORAL at 08:03

## 2023-11-22 RX ADMIN — Medication 600 MILLIGRAM(S): at 21:45

## 2023-11-22 RX ADMIN — Medication 600 MILLIGRAM(S): at 20:46

## 2023-11-22 RX ADMIN — HEPARIN SODIUM 5000 UNIT(S): 5000 INJECTION INTRAVENOUS; SUBCUTANEOUS at 17:49

## 2023-11-22 RX ADMIN — TRAMADOL HYDROCHLORIDE 25 MILLIGRAM(S): 50 TABLET ORAL at 00:48

## 2023-11-22 RX ADMIN — Medication 975 MILLIGRAM(S): at 11:16

## 2023-11-22 RX ADMIN — Medication 975 MILLIGRAM(S): at 06:13

## 2023-11-22 RX ADMIN — TRAMADOL HYDROCHLORIDE 25 MILLIGRAM(S): 50 TABLET ORAL at 09:00

## 2023-11-22 NOTE — CHART NOTE - NSCHARTNOTEFT_GEN_A_CORE
Labs reviewed  Na normalized and Cr normalized and stable  Will discontinue followup and call with questions    Sadie Kauffman MD  Off: [phone]  contact me on teams    (After 5 pm or on weekends please page the on-call fellow/attending, can check [email or web address] for schedule. Login is torrie montanez, schedule under [facility] medicine, psych, derm) Labs reviewed  Na normalized and Cr normalized and stable  Will discontinue followup and call with questions    Sadie Kauffman MD  Off: [phone]  contact me on teams    (After 5 pm or on weekends please page the on-call fellow/attending, can check [email or web address] for schedule. Login is torrie montanez, schedule under [facility] medicine, psych, derm) Labs reviewed  Na normalized and Cr normalized and stable  Will discontinue followup and call with questions    Sadie Kauffman MD  Off: [phone]  contact me on teams    (After 5 pm or on weekends please page the on-call fellow/attending, can check [email or web address] for schedule. Login is torrie montanez, schedule under [facility] medicine, psych, derm)

## 2023-11-22 NOTE — CHART NOTE - NSCHARTNOTESELECT_GEN_ALL_CORE
Event Note
Event Note
R3 Eval Note
R3 clinical update
R3/Event Note
R3 Plan of Care Note
Event Note
Event Note
Nephrology
R3 Update

## 2023-11-22 NOTE — PROGRESS NOTE ADULT - ASSESSMENT
A/P: 32yo POD#3 s/p pLTCS @27w3d for sPEC with worsening Cr complicated by AUGUSTA, mild transaminitis and hyponatremia. on POD#1 H/h was noted to downtrend to 5/15 and CTAP showed rectus sheath hematoma.  Now sp 3uPRBC and 1u cryo with appropriate rise in H/h. Clinically improving. Pt stable overnight.        #rectus sheath and bladder hematoma  - CTAP(11/20): Rectus sheath hematoma 4.4 x 13.6 x 14.3 cm.  Additional extraperitoneal hematoma deep to the anterior abdominal wall and anterior to  bladder measures 1.6 x 11.3 x 7.2 cm. No evidence of active arterial bleed. Trace ascites. Florence catheter with tip seen tenting the right wall of the bladder. Small b/l pleural effusions with associated bilateral lower lobe partial atelectasis.  -  3uPRBC and 1u cryo with appropriate rise in H/h.  - cont to trend H/h - f/u AM CBC    #hyponatremia  - improved  - Cont to trend BMP, urine osm, iCa  - appreciate Nephrology consult - concern for hypovolemic hyponatremia.     #AUGUSTA  - improved  - cont to trend HELLP labs, BNP  - appreciate Nephrology recs: trend urine osm, iCa, No objection to 2-3 days of NSAIDS PRN for pain. D/c IV fluids. Trend urine osm and SNa.     #sPEC  - s/p Magnesium for seizure prophylaxis x24h  - BP's well controlled overnight  - Cont Procardia 30 QD. S/p Pro 10/20 (11/17), Lab 20 (11/17)  - HELLP labs abnormal; f/u AM HELLP labs      -- transaminitis: 34/43->74/83-> 63/72->>43/66->23/28      -- Plts: 215-> 129-> ->>>221->>193->156->>182      -- Cr: 0.55->>1.19->1.28->1.18 ->1.00->>0.68      -- P/C: 8.1->1.2  - F/u 24h urine protein collection --> consider Lovenox for postpartum prophylaxis in setting of possible nephrotic range proteinuria  - cont to monitor for S/s sPEC  - holding NSAIDS     #postpartum   - Increase ambulation.  - Continue tylenol, tramadol for pain control.   - SCDs for DVT ppx, holding HSQ in setting of hematoma    SGolden-Salazar, PGY3   A/P: 34yo POD#3 s/p pLTCS @27w3d for sPEC with worsening Cr complicated by AUGUSTA, mild transaminitis and hyponatremia. on POD#1 H/h was noted to downtrend to 5/15 and CTAP showed rectus sheath hematoma.  Now sp 3uPRBC and 1u cryo with appropriate rise in H/h. Clinically improving. Pt stable overnight.        #rectus sheath and bladder hematoma  - CTAP(11/20): Rectus sheath hematoma 4.4 x 13.6 x 14.3 cm.  Additional extraperitoneal hematoma deep to the anterior abdominal wall and anterior to  bladder measures 1.6 x 11.3 x 7.2 cm. No evidence of active arterial bleed. Trace ascites. Florence catheter with tip seen tenting the right wall of the bladder. Small b/l pleural effusions with associated bilateral lower lobe partial atelectasis.  -  3uPRBC and 1u cryo with appropriate rise in H/h.  - cont to trend H/h - f/u AM CBC    #hyponatremia  - improved  - Cont to trend BMP, urine osm, iCa  - appreciate Nephrology consult - concern for hypovolemic hyponatremia.     #AUGUSTA  - improved  - cont to trend HELLP labs, BNP  - appreciate Nephrology recs: trend urine osm, iCa, No objection to 2-3 days of NSAIDS PRN for pain. D/c IV fluids. Trend urine osm and SNa.     #sPEC  - s/p Magnesium for seizure prophylaxis x24h  - BP's well controlled overnight  - Cont Procardia 30 QD. S/p Pro 10/20 (11/17), Lab 20 (11/17)  - HELLP labs abnormal; f/u AM HELLP labs      -- transaminitis: 34/43->74/83-> 63/72->>43/66->23/28      -- Plts: 215-> 129-> ->>>221->>193->156->>182      -- Cr: 0.55->>1.19->1.28->1.18 ->1.00->>0.68      -- P/C: 8.1->1.2  - F/u 24h urine protein collection --> consider Lovenox for postpartum prophylaxis in setting of possible nephrotic range proteinuria  - cont to monitor for S/s sPEC  - holding NSAIDS     #postpartum   - Increase ambulation.  - Continue tylenol, tramadol for pain control.   - SCDs for DVT ppx, holding HSQ in setting of hematoma    SGolden-Salazar, PGY3

## 2023-11-22 NOTE — PROGRESS NOTE ADULT - SUBJECTIVE AND OBJECTIVE BOX
Postpartum Note    Interval events:    Patient seen and examined at bedside, no acute overnight events. No acute complaints. Denies HA, epigastric pain, blurred vision, CP, SOB, N/V, fevers, and chills.    Vital Signs Last 24 Hours  T(C): 36.3 (11-21-23 @ 23:07), Max: 37.3 (11-21-23 @ 08:50)  HR: 74 (11-21-23 @ 23:07) (73 - 82)  BP: 134/78 (11-21-23 @ 23:07) (125/61 - 146/80)  RR: 18 (11-21-23 @ 23:07) (18 - 18)  SpO2: 95% (11-21-23 @ 23:07) (95% - 97%)      Physical Exam:  General: NAD  Abdomen: Soft, Mildly distended, appropriately tender, fundus firm. Incision C/D/I.    : Lochia wnl.  Florence catheter in place with clear urine.   Ext: 2+ pitting edema b/l, no erythema or tenderness.       Labs:             8.8    23.41 )-----------( 182      ( 11-21 @ 18:26 )             24.5     11-21 @ 18:26    134  |  102  |  14  ----------------------------<  104  4.7   |  23  |  0.68    Ca    7.4      11-21 @ 18:26    TPro  4.4  /  Alb  2.3  /  TBili  0.2  /  DBili  x   /  AST  23  /  ALT  28  /  AlkPhos  116  11-21 @ 18:26    PT/INR - ( 11-21 @ 18:26 )   PT: 8.9 sec;   INR: 0.80 ratio    PTT - ( 11-21 @ 18:26 )  PTT:26.5 sec    Uric Acid: (11-21 @ 18:26)  6.2      Fibrinogen: (11-21 @ 18:26)  --       LDH: (11-21 @ 18:26)  322        MEDICATIONS  (STANDING):  acetaminophen     Tablet .. 975 milliGRAM(s) Oral every 6 hours  diphtheria/tetanus/pertussis (acellular) Vaccine (Adacel) 0.5 milliLiter(s) IntraMuscular once  levothyroxine 50 MICROGram(s) Oral daily  magnesium sulfate Infusion 1 Gm/Hr (25 mL/Hr) IV Continuous <Continuous>  NIFEdipine XL 30 milliGRAM(s) Oral daily  oxytocin Infusion 333.333 milliUNIT(s)/Min (1000 mL/Hr) IV Continuous <Continuous>  sodium zirconium cyclosilicate 10 Gram(s) Oral once    MEDICATIONS  (PRN):  diphenhydrAMINE 25 milliGRAM(s) Oral every 6 hours PRN Pruritus  lanolin Ointment 1 Application(s) Topical every 6 hours PRN Sore Nipples  magnesium hydroxide Suspension 30 milliLiter(s) Oral two times a day PRN Constipation  simethicone 80 milliGRAM(s) Chew every 4 hours PRN Gas  traMADol 25 milliGRAM(s) Oral every 8 hours PRN Moderate Pain (4 - 6)

## 2023-11-22 NOTE — PROGRESS NOTE ADULT - ATTENDING COMMENTS
pt seen and examined. overall doing well. hematoma stable. h/h stable and VSS. exam benign. continue routine postop care. eval for dc home tomorrow.      thea castellanos

## 2023-11-23 ENCOUNTER — TRANSCRIPTION ENCOUNTER (OUTPATIENT)
Age: 33
End: 2023-11-23

## 2023-11-23 VITALS
OXYGEN SATURATION: 99 % | DIASTOLIC BLOOD PRESSURE: 80 MMHG | RESPIRATION RATE: 18 BRPM | TEMPERATURE: 99 F | SYSTOLIC BLOOD PRESSURE: 131 MMHG | HEART RATE: 72 BPM

## 2023-11-23 LAB
ALBUMIN SERPL ELPH-MCNC: 2.4 G/DL — LOW (ref 3.3–5)
ALP SERPL-CCNC: 122 U/L — HIGH (ref 40–120)
ALT FLD-CCNC: 28 U/L — SIGNIFICANT CHANGE UP (ref 10–45)
ANION GAP SERPL CALC-SCNC: 8 MMOL/L — SIGNIFICANT CHANGE UP (ref 5–17)
APTT BLD: 25.9 SEC — SIGNIFICANT CHANGE UP (ref 24.5–35.6)
AST SERPL-CCNC: 28 U/L — SIGNIFICANT CHANGE UP (ref 10–40)
BASOPHILS # BLD AUTO: 0.09 K/UL — SIGNIFICANT CHANGE UP (ref 0–0.2)
BASOPHILS NFR BLD AUTO: 0.6 % — SIGNIFICANT CHANGE UP (ref 0–2)
BILIRUB SERPL-MCNC: 0.3 MG/DL — SIGNIFICANT CHANGE UP (ref 0.2–1.2)
BUN SERPL-MCNC: 10 MG/DL — SIGNIFICANT CHANGE UP (ref 7–23)
CALCIUM SERPL-MCNC: 7.8 MG/DL — LOW (ref 8.4–10.5)
CHLORIDE SERPL-SCNC: 104 MMOL/L — SIGNIFICANT CHANGE UP (ref 96–108)
CO2 SERPL-SCNC: 25 MMOL/L — SIGNIFICANT CHANGE UP (ref 22–31)
CREAT SERPL-MCNC: 0.63 MG/DL — SIGNIFICANT CHANGE UP (ref 0.5–1.3)
EGFR: 121 ML/MIN/1.73M2 — SIGNIFICANT CHANGE UP
EOSINOPHIL # BLD AUTO: 1.38 K/UL — HIGH (ref 0–0.5)
EOSINOPHIL NFR BLD AUTO: 8.5 % — HIGH (ref 0–6)
FIBRINOGEN PPP-MCNC: 399 MG/DL — SIGNIFICANT CHANGE UP (ref 200–445)
GLUCOSE SERPL-MCNC: 85 MG/DL — SIGNIFICANT CHANGE UP (ref 70–99)
HCT VFR BLD CALC: 22 % — LOW (ref 34.5–45)
HGB BLD-MCNC: 7.4 G/DL — LOW (ref 11.5–15.5)
IMM GRANULOCYTES NFR BLD AUTO: 5.3 % — HIGH (ref 0–0.9)
INR BLD: 0.83 RATIO — LOW (ref 0.85–1.18)
LDH SERPL L TO P-CCNC: 243 U/L — HIGH (ref 50–242)
LYMPHOCYTES # BLD AUTO: 22 % — SIGNIFICANT CHANGE UP (ref 13–44)
LYMPHOCYTES # BLD AUTO: 3.59 K/UL — HIGH (ref 1–3.3)
MCHC RBC-ENTMCNC: 30.5 PG — SIGNIFICANT CHANGE UP (ref 27–34)
MCHC RBC-ENTMCNC: 33.6 GM/DL — SIGNIFICANT CHANGE UP (ref 32–36)
MCV RBC AUTO: 90.5 FL — SIGNIFICANT CHANGE UP (ref 80–100)
MONOCYTES # BLD AUTO: 1.04 K/UL — HIGH (ref 0–0.9)
MONOCYTES NFR BLD AUTO: 6.4 % — SIGNIFICANT CHANGE UP (ref 2–14)
NEUTROPHILS # BLD AUTO: 9.34 K/UL — HIGH (ref 1.8–7.4)
NEUTROPHILS NFR BLD AUTO: 57.2 % — SIGNIFICANT CHANGE UP (ref 43–77)
NRBC # BLD: 0 /100 WBCS — SIGNIFICANT CHANGE UP (ref 0–0)
PLATELET # BLD AUTO: 238 K/UL — SIGNIFICANT CHANGE UP (ref 150–400)
POTASSIUM SERPL-MCNC: 4.8 MMOL/L — SIGNIFICANT CHANGE UP (ref 3.5–5.3)
POTASSIUM SERPL-SCNC: 4.8 MMOL/L — SIGNIFICANT CHANGE UP (ref 3.5–5.3)
PROT SERPL-MCNC: 4.4 G/DL — LOW (ref 6–8.3)
PROTHROM AB SERPL-ACNC: 8.8 SEC — LOW (ref 9.5–13)
RBC # BLD: 2.43 M/UL — LOW (ref 3.8–5.2)
RBC # FLD: 16.5 % — HIGH (ref 10.3–14.5)
SODIUM SERPL-SCNC: 137 MMOL/L — SIGNIFICANT CHANGE UP (ref 135–145)
URATE SERPL-MCNC: 5.5 MG/DL — SIGNIFICANT CHANGE UP (ref 2.5–7)
WBC # BLD: 16.31 K/UL — HIGH (ref 3.8–10.5)
WBC # FLD AUTO: 16.31 K/UL — HIGH (ref 3.8–10.5)

## 2023-11-23 PROCEDURE — 59050 FETAL MONITOR W/REPORT: CPT

## 2023-11-23 PROCEDURE — 81001 URINALYSIS AUTO W/SCOPE: CPT

## 2023-11-23 PROCEDURE — 86850 RBC ANTIBODY SCREEN: CPT

## 2023-11-23 PROCEDURE — 74177 CT ABD & PELVIS W/CONTRAST: CPT

## 2023-11-23 PROCEDURE — 87653 STREP B DNA AMP PROBE: CPT

## 2023-11-23 PROCEDURE — P9016: CPT

## 2023-11-23 PROCEDURE — 82570 ASSAY OF URINE CREATININE: CPT

## 2023-11-23 PROCEDURE — 36415 COLL VENOUS BLD VENIPUNCTURE: CPT

## 2023-11-23 PROCEDURE — 82330 ASSAY OF CALCIUM: CPT

## 2023-11-23 PROCEDURE — 87186 SC STD MICRODIL/AGAR DIL: CPT

## 2023-11-23 PROCEDURE — 59025 FETAL NON-STRESS TEST: CPT

## 2023-11-23 PROCEDURE — 90715 TDAP VACCINE 7 YRS/> IM: CPT

## 2023-11-23 PROCEDURE — 83615 LACTATE (LD) (LDH) ENZYME: CPT

## 2023-11-23 PROCEDURE — 84550 ASSAY OF BLOOD/URIC ACID: CPT

## 2023-11-23 PROCEDURE — 36430 TRANSFUSION BLD/BLD COMPNT: CPT

## 2023-11-23 PROCEDURE — 84300 ASSAY OF URINE SODIUM: CPT

## 2023-11-23 PROCEDURE — 80053 COMPREHEN METABOLIC PANEL: CPT

## 2023-11-23 PROCEDURE — 71045 X-RAY EXAM CHEST 1 VIEW: CPT

## 2023-11-23 PROCEDURE — 84133 ASSAY OF URINE POTASSIUM: CPT

## 2023-11-23 PROCEDURE — 83935 ASSAY OF URINE OSMOLALITY: CPT

## 2023-11-23 PROCEDURE — 86146 BETA-2 GLYCOPROTEIN ANTIBODY: CPT

## 2023-11-23 PROCEDURE — 84156 ASSAY OF PROTEIN URINE: CPT

## 2023-11-23 PROCEDURE — 83735 ASSAY OF MAGNESIUM: CPT

## 2023-11-23 PROCEDURE — 85610 PROTHROMBIN TIME: CPT

## 2023-11-23 PROCEDURE — 86900 BLOOD TYPING SEROLOGIC ABO: CPT

## 2023-11-23 PROCEDURE — P9012: CPT

## 2023-11-23 PROCEDURE — 85613 RUSSELL VIPER VENOM DILUTED: CPT

## 2023-11-23 PROCEDURE — 85730 THROMBOPLASTIN TIME PARTIAL: CPT

## 2023-11-23 PROCEDURE — 86901 BLOOD TYPING SEROLOGIC RH(D): CPT

## 2023-11-23 PROCEDURE — 93005 ELECTROCARDIOGRAM TRACING: CPT

## 2023-11-23 PROCEDURE — 88307 TISSUE EXAM BY PATHOLOGIST: CPT

## 2023-11-23 PROCEDURE — 85384 FIBRINOGEN ACTIVITY: CPT

## 2023-11-23 PROCEDURE — 85025 COMPLETE CBC W/AUTO DIFF WBC: CPT

## 2023-11-23 PROCEDURE — 86965 POOLING BLOOD PLATELETS: CPT

## 2023-11-23 PROCEDURE — 86923 COMPATIBILITY TEST ELECTRIC: CPT

## 2023-11-23 PROCEDURE — 86147 CARDIOLIPIN ANTIBODY EA IG: CPT

## 2023-11-23 PROCEDURE — 83930 ASSAY OF BLOOD OSMOLALITY: CPT

## 2023-11-23 RX ORDER — NIFEDIPINE 30 MG
1 TABLET, EXTENDED RELEASE 24 HR ORAL
Qty: 0 | Refills: 0 | DISCHARGE
Start: 2023-11-23

## 2023-11-23 RX ORDER — NIFEDIPINE 30 MG/1
30 TABLET, FILM COATED, EXTENDED RELEASE ORAL DAILY
Qty: 90 | Refills: 2 | Status: ACTIVE | COMMUNITY
Start: 2023-11-23 | End: 1900-01-01

## 2023-11-23 RX ORDER — LANOLIN
1 OINTMENT (GRAM) TOPICAL
Qty: 0 | Refills: 0 | DISCHARGE
Start: 2023-11-23

## 2023-11-23 RX ORDER — IBUPROFEN 200 MG
1 TABLET ORAL
Qty: 0 | Refills: 0 | DISCHARGE
Start: 2023-11-23

## 2023-11-23 RX ORDER — SIMETHICONE 80 MG/1
1 TABLET, CHEWABLE ORAL
Qty: 0 | Refills: 0 | DISCHARGE
Start: 2023-11-23

## 2023-11-23 RX ORDER — MAGNESIUM HYDROXIDE 400 MG/1
30 TABLET, CHEWABLE ORAL
Qty: 0 | Refills: 0 | DISCHARGE
Start: 2023-11-23

## 2023-11-23 RX ORDER — TRAMADOL HYDROCHLORIDE 50 MG/1
1 TABLET ORAL
Qty: 6 | Refills: 0
Start: 2023-11-23 | End: 2023-11-24

## 2023-11-23 RX ORDER — TETANUS TOXOID, REDUCED DIPHTHERIA TOXOID AND ACELLULAR PERTUSSIS VACCINE, ADSORBED 5; 2.5; 8; 8; 2.5 [IU]/.5ML; [IU]/.5ML; UG/.5ML; UG/.5ML; UG/.5ML
0.5 SUSPENSION INTRAMUSCULAR ONCE
Refills: 0 | Status: COMPLETED | OUTPATIENT
Start: 2023-11-23 | End: 2023-11-23

## 2023-11-23 RX ORDER — ACETAMINOPHEN 500 MG
3 TABLET ORAL
Qty: 0 | Refills: 0 | DISCHARGE
Start: 2023-11-23

## 2023-11-23 RX ORDER — OXYCODONE 5 MG/1
5 TABLET ORAL
Qty: 15 | Refills: 0 | Status: ACTIVE | COMMUNITY
Start: 2023-11-23 | End: 1900-01-01

## 2023-11-23 RX ADMIN — Medication 600 MILLIGRAM(S): at 11:15

## 2023-11-23 RX ADMIN — Medication 50 MICROGRAM(S): at 05:46

## 2023-11-23 RX ADMIN — Medication 975 MILLIGRAM(S): at 00:30

## 2023-11-23 RX ADMIN — Medication 600 MILLIGRAM(S): at 02:55

## 2023-11-23 RX ADMIN — Medication 975 MILLIGRAM(S): at 06:29

## 2023-11-23 RX ADMIN — Medication 600 MILLIGRAM(S): at 10:28

## 2023-11-23 RX ADMIN — Medication 600 MILLIGRAM(S): at 02:58

## 2023-11-23 RX ADMIN — Medication 975 MILLIGRAM(S): at 12:18

## 2023-11-23 RX ADMIN — Medication 975 MILLIGRAM(S): at 05:46

## 2023-11-23 RX ADMIN — HEPARIN SODIUM 5000 UNIT(S): 5000 INJECTION INTRAVENOUS; SUBCUTANEOUS at 05:47

## 2023-11-23 RX ADMIN — Medication 975 MILLIGRAM(S): at 00:14

## 2023-11-23 RX ADMIN — TETANUS TOXOID, REDUCED DIPHTHERIA TOXOID AND ACELLULAR PERTUSSIS VACCINE, ADSORBED 0.5 MILLILITER(S): 5; 2.5; 8; 8; 2.5 SUSPENSION INTRAMUSCULAR at 06:15

## 2023-11-23 RX ADMIN — Medication 975 MILLIGRAM(S): at 13:00

## 2023-11-23 NOTE — DISCHARGE NOTE OB - NS MD DC FALL RISK RISK
For information on Fall & Injury Prevention, visit: [email or web address] OR  [email or web address] OR  https://www.cdc.gov/steadi/patient.html For information on Fall & Injury Prevention, visit: [email or web address] OR  [email or web address]-injury OR  https://www.cdc.gov/steadi/patient.html For information on Fall & Injury Prevention, visit: [email or web address]-[email or web address] OR  [email or web address] OR  https://www.cdc.gov/steadi/patient.html

## 2023-11-23 NOTE — PROGRESS NOTE ADULT - ASSESSMENT
A/P: 34yo POD#4 s/p pLTCS @27w3d for sPEC with worsening Cr complicated by AUGUSTA, mild transaminitis and hyponatremia. on POD#1 H/h was noted to downtrend to 5/15 and CTAP showed rectus sheath hematoma.  Now sp 3uPRBC and 1u cryo with appropriate rise in H/h. Clinically improving. Pt stable overnight.         #rectus sheath and bladder hematoma  - CTAP(11/20): Rectus sheath hematoma 4.4 x 13.6 x 14.3 cm.  Additional extraperitoneal hematoma deep to the anterior abdominal wall and anterior to  bladder measures 1.6 x 11.3 x 7.2 cm. No evidence of active arterial bleed. Trace ascites. Florence catheter with tip seen tenting the right wall of the bladder. Small b/l pleural effusions with associated bilateral lower lobe partial atelectasis.  -  3uPRBC and 1u cryo with appropriate rise in H/h.  - cont to trend H/h - f/u AM CBC    #hyponatremia  - improved  - Cont to trend BMP  - appreciate Nephrology consult    #[place]A  - improved  - cont to trend HELLP labs, BNP  - appreciate Nephrology recs: trend urine osm, iCa, No objection to 2-3 days of NSAIDS PRN for pain. D/c IV fluids. Trend urine osm and SNa.     #sPEC  - s/p Magnesium for seizure prophylaxis x24h  - BP's well controlled overnight  - Cont Procardia 30 QD. S/p Pro 10/20 (11/17), Lab 20 (11/17)  - HELLP labs abnormal; f/u AM HELLP labs      -- transaminitis: 34/43->74/83-> 63/72->>43/66->>25/28      -- Plts: 215-> 129-> ->>>221->>193->156->>182->>223      -- Cr: 0.55->>1.19->1.28->1.18 ->1.00->>0.68->0.64      -- P/C: 8.1->1.2  - 24h urine protein 2646  - cont to monitor for S/s sPEC  - holding NSAIDS     #postpartum   - Increase ambulation.  - Continue tylenol, tramadol for pain control.   - SCDs for DVT ppx, holding HSQ in setting of hematoma    Yani, PGY3 A/P: 32yo POD#4 s/p pLTCS @27w3d for sPEC with worsening Cr complicated by AUGUSTA, mild transaminitis and hyponatremia. on POD#1 H/h was noted to downtrend to 5/15 and CTAP showed rectus sheath hematoma.  Now sp 3uPRBC and 1u cryo with appropriate rise in H/h. Clinically improving. Pt stable overnight.         #rectus sheath and bladder hematoma  - CTAP(11/20): Rectus sheath hematoma 4.4 x 13.6 x 14.3 cm.  Additional extraperitoneal hematoma deep to the anterior abdominal wall and anterior to  bladder measures 1.6 x 11.3 x 7.2 cm. No evidence of active arterial bleed. Trace ascites. Florence catheter with tip seen tenting the right wall of the bladder. Small b/l pleural effusions with associated bilateral lower lobe partial atelectasis.  -  3uPRBC and 1u cryo with appropriate rise in H/h.  - cont to trend H/h - f/u AM CBC    #hyponatremia  - improved  - Cont to trend BMP  - appreciate Nephrology consult    #[place]  - improved  - cont to trend HELLP labs, BNP  - appreciate Nephrology recs: trend urine osm, iCa, No objection to 2-3 days of NSAIDS PRN for pain. D/c IV fluids. Trend urine osm and SNa.     #sPEC  - s/p Magnesium for seizure prophylaxis x24h  - BP's well controlled overnight  - Cont Procardia 30 QD. S/p Pro 10/20 (11/17), Lab 20 (11/17)  - HELLP labs abnormal; f/u AM HELLP labs      -- transaminitis: 34/43->74/83-> 63/72->>43/66->>25/28      -- Plts: 215-> 129-> ->>>221->>193->156->>182->>223      -- Cr: 0.55->>1.19->1.28->1.18 ->1.00->>0.68->0.64      -- P/C: 8.1->1.2  - 24h urine protein 2646  - cont to monitor for S/s sPEC  - holding NSAIDS     #postpartum   - Increase ambulation.  - Continue tylenol, tramadol for pain control.   - SCDs for DVT ppx, holding HSQ in setting of hematoma    Yani, PGY3

## 2023-11-23 NOTE — PROGRESS NOTE ADULT - SUBJECTIVE AND OBJECTIVE BOX
OB Postpartum Note:  Delivery, POD#4    S: 34yo POD#3 s/p LTCS. The patient feels well.  Pain is well controlled. She is tolerating a regular diet and passing flatus. She is voiding spontaneously, and ambulating without difficulty. Denies CP/SOB. Denies lightheadedness/dizziness. Denies N/V.    O:  Vitals:  Vital Signs Last 24 Hrs  T(C): 36.9 (2023 05:35), Max: 36.9 (2023 05:35)  T(F): 98.4 (2023 05:35), Max: 98.4 (2023 05:35)  HR: 77 (2023 05:35) (67 - 77)  BP: 127/75 (2023 05:35) (127/75 - 148/83)  BP(mean): --  RR: 18 (2023 05:35) (18 - 18)  SpO2: 96% (2023 05:35) (95% - 96%)    Parameters below as of 2023 05:35  Patient On (Oxygen Delivery Method): room air        MEDICATIONS  (STANDING):  acetaminophen     Tablet .. 975 milliGRAM(s) Oral every 6 hours  diphtheria/tetanus/pertussis (acellular) Vaccine (Adacel) 0.5 milliLiter(s) IntraMuscular once  heparin   Injectable 5000 Unit(s) SubCutaneous every 12 hours  ibuprofen  Tablet. 600 milliGRAM(s) Oral every 6 hours  levothyroxine 50 MICROGram(s) Oral daily  magnesium sulfate Infusion 1 Gm/Hr (25 mL/Hr) IV Continuous <Continuous>  NIFEdipine XL 30 milliGRAM(s) Oral daily  oxytocin Infusion 333.333 milliUNIT(s)/Min (1000 mL/Hr) IV Continuous <Continuous>  sodium zirconium cyclosilicate 10 Gram(s) Oral once    MEDICATIONS  (PRN):  diphenhydrAMINE 25 milliGRAM(s) Oral every 6 hours PRN Pruritus  lanolin Ointment 1 Application(s) Topical every 6 hours PRN Sore Nipples  magnesium hydroxide Suspension 30 milliLiter(s) Oral two times a day PRN Constipation  simethicone 80 milliGRAM(s) Chew every 4 hours PRN Gas  traMADol 25 milliGRAM(s) Oral every 8 hours PRN Moderate Pain (4 - 6)      LABS:  Blood type: B Positive  Rubella IgG: RPR:                           8.6<L>   22.67<H> >-----------< 223    (  @ 06:22 )             24.9<L>                        8.8<L>   23.41<H> >-----------< 182    (  18:26 )             24.5<L>                        9.2<L>   22.75<H> >-----------< 179    (  08:01 )             25.6<L>                        9.0<L>   22.04<H> >-----------< 156    (  03:00 )             25.4<L>                        8.9<L>   22.55<H> >-----------< 193    (  21:08 )             25.0<L>                        5.4<LL>   21.93<H> >-----------< 221    (  @ 15:50 )             15.9<LL>                        5.4<LL>   21.81<H> >-----------< 209    (  @ 14:33 )             15.7<LL>    23 @ 06:22      135  |  102  |  11  ----------------------------<  89  4.6   |  24  |  0.64    23 @ 18:26      134<L>  |  102  |  14  ----------------------------<  104<H>  4.7   |  23  |  0.68    23 @ 08:01      134<L>  |  103  |  20  ----------------------------<  86  4.4   |  25  |  0.87    23 @ 03:00      134<L>  |  103  |  23  ----------------------------<  117<H>  4.5   |  24  |  0.93    23 @ 21:08      129<L>  |  99  |  28<H>  ----------------------------<  103<H>  5.2   |  22  |  1.00    23 @ 15:50      126<L>  |  98  |  30<H>  ----------------------------<  114<H>  5.7<H>   |  22  |  1.13    23 @ 11:30      126<L>  |  98  |  32<H>  ----------------------------<  131<H>  5.7<H>   |  20<L>  |  1.13        Ca    7.9<L>      2023 06:22  Ca    7.4<L>      2023 18:26  Ca    6.5<LL>      2023 08:01  Ca    6.7<L>      2023 03:00  Ca    7.3<L>      2023 21:08  Ca    6.0<LL>      2023 15:50  Ca    5.7<LL>      2023 11:30    TPro  4.4<L>  /  Alb  2.1<L>  /  TBili  0.2  /  DBili  x   /  AST  25  /  ALT  28  /  AlkPhos  123<H>  23 @ 06:22  TPro  4.4<L>  /  Alb  2.3<L>  /  TBili  0.2  /  DBili  x   /  AST  23  /  ALT  28  /  AlkPhos  116  23 @ 18:26  TPro  4.0<L>  /  Alb  2.1<L>  /  TBili  0.2  /  DBili  x   /  AST  21  /  ALT  31  /  AlkPhos  107  23 @ 08:01  TPro  3.9<L>  /  Alb  2.1<L>  /  TBili  0.2  /  DBili  x   /  AST  22  /  ALT  32  /  AlkPhos  104  23 @ 03:00  TPro  4.2<L>  /  Alb  2.2<L>  /  TBili  0.2  /  DBili  x   /  AST  29  /  ALT  36  /  AlkPhos  114  23 @ 21:08  TPro  3.9<L>  /  Alb  2.0<L>  /  TBili  0.1<L>  /  DBili  x   /  AST  28  /  ALT  36  /  AlkPhos  104  23 @ 15:50  TPro  3.6<L>  /  Alb  1.9<L>  /  TBili  <0.1<L>  /  DBili  x   /  AST  23  /  ALT  35  /  AlkPhos  95  23 @ 11:30        Physical Exam:  General: NAD  Abdomen: Soft, Mildly distended, appropriately tender, fundus firm. Incision C/D/I.    : Lochia wnl.  Florence catheter in place with clear urine.   Ext: 1+ pitting edema b/l, no erythema or tenderness.    OB Postpartum Note:  Delivery, POD#4    S: 32yo POD#3 s/p LTCS. The patient feels well.  Pain is well controlled. She is tolerating a regular diet and passing flatus. She is voiding spontaneously, and ambulating without difficulty. Denies CP/SOB. Denies lightheadedness/dizziness. Denies N/V.    O:  Vitals:  Vital Signs Last 24 Hrs  T(C): 36.9 (2023 05:35), Max: 36.9 (2023 05:35)  T(F): 98.4 (2023 05:35), Max: 98.4 (2023 05:35)  HR: 77 (2023 05:35) (67 - 77)  BP: 127/75 (2023 05:35) (127/75 - 148/83)  BP(mean): --  RR: 18 (2023 05:35) (18 - 18)  SpO2: 96% (2023 05:35) (95% - 96%)    Parameters below as of 2023 05:35  Patient On (Oxygen Delivery Method): room air        MEDICATIONS  (STANDING):  acetaminophen     Tablet .. 975 milliGRAM(s) Oral every 6 hours  diphtheria/tetanus/pertussis (acellular) Vaccine (Adacel) 0.5 milliLiter(s) IntraMuscular once  heparin   Injectable 5000 Unit(s) SubCutaneous every 12 hours  ibuprofen  Tablet. 600 milliGRAM(s) Oral every 6 hours  levothyroxine 50 MICROGram(s) Oral daily  magnesium sulfate Infusion 1 Gm/Hr (25 mL/Hr) IV Continuous <Continuous>  NIFEdipine XL 30 milliGRAM(s) Oral daily  oxytocin Infusion 333.333 milliUNIT(s)/Min (1000 mL/Hr) IV Continuous <Continuous>  sodium zirconium cyclosilicate 10 Gram(s) Oral once    MEDICATIONS  (PRN):  diphenhydrAMINE 25 milliGRAM(s) Oral every 6 hours PRN Pruritus  lanolin Ointment 1 Application(s) Topical every 6 hours PRN Sore Nipples  magnesium hydroxide Suspension 30 milliLiter(s) Oral two times a day PRN Constipation  simethicone 80 milliGRAM(s) Chew every 4 hours PRN Gas  traMADol 25 milliGRAM(s) Oral every 8 hours PRN Moderate Pain (4 - 6)      LABS:  Blood type: B Positive  Rubella IgG: RPR:                           8.6<L>   22.67<H> >-----------< 223    (  @ 06:22 )             24.9<L>                        8.8<L>   23.41<H> >-----------< 182    (  18:26 )             24.5<L>                        9.2<L>   22.75<H> >-----------< 179    (  08:01 )             25.6<L>                        9.0<L>   22.04<H> >-----------< 156    (  03:00 )             25.4<L>                        8.9<L>   22.55<H> >-----------< 193    (  21:08 )             25.0<L>                        5.4<LL>   21.93<H> >-----------< 221    (  @ 15:50 )             15.9<LL>                        5.4<LL>   21.81<H> >-----------< 209    (  @ 14:33 )             15.7<LL>    23 @ 06:22      135  |  102  |  11  ----------------------------<  89  4.6   |  24  |  0.64    23 @ 18:26      134<L>  |  102  |  14  ----------------------------<  104<H>  4.7   |  23  |  0.68    23 @ 08:01      134<L>  |  103  |  20  ----------------------------<  86  4.4   |  25  |  0.87    23 @ 03:00      134<L>  |  103  |  23  ----------------------------<  117<H>  4.5   |  24  |  0.93    23 @ 21:08      129<L>  |  99  |  28<H>  ----------------------------<  103<H>  5.2   |  22  |  1.00    23 @ 15:50      126<L>  |  98  |  30<H>  ----------------------------<  114<H>  5.7<H>   |  22  |  1.13    23 @ 11:30      126<L>  |  98  |  32<H>  ----------------------------<  131<H>  5.7<H>   |  20<L>  |  1.13        Ca    7.9<L>      2023 06:22  Ca    7.4<L>      2023 18:26  Ca    6.5<LL>      2023 08:01  Ca    6.7<L>      2023 03:00  Ca    7.3<L>      2023 21:08  Ca    6.0<LL>      2023 15:50  Ca    5.7<LL>      2023 11:30    TPro  4.4<L>  /  Alb  2.1<L>  /  TBili  0.2  /  DBili  x   /  AST  25  /  ALT  28  /  AlkPhos  123<H>  23 @ 06:22  TPro  4.4<L>  /  Alb  2.3<L>  /  TBili  0.2  /  DBili  x   /  AST  23  /  ALT  28  /  AlkPhos  116  23 @ 18:26  TPro  4.0<L>  /  Alb  2.1<L>  /  TBili  0.2  /  DBili  x   /  AST  21  /  ALT  31  /  AlkPhos  107  23 @ 08:01  TPro  3.9<L>  /  Alb  2.1<L>  /  TBili  0.2  /  DBili  x   /  AST  22  /  ALT  32  /  AlkPhos  104  23 @ 03:00  TPro  4.2<L>  /  Alb  2.2<L>  /  TBili  0.2  /  DBili  x   /  AST  29  /  ALT  36  /  AlkPhos  114  23 @ 21:08  TPro  3.9<L>  /  Alb  2.0<L>  /  TBili  0.1<L>  /  DBili  x   /  AST  28  /  ALT  36  /  AlkPhos  104  23 @ 15:50  TPro  3.6<L>  /  Alb  1.9<L>  /  TBili  <0.1<L>  /  DBili  x   /  AST  23  /  ALT  35  /  AlkPhos  95  23 @ 11:30        Physical Exam:  General: NAD  Abdomen: Soft, Mildly distended, appropriately tender, fundus firm. Incision C/D/I.    : Lochia wnl.  Florence catheter in place with clear urine.   Ext: 1+ pitting edema b/l, no erythema or tenderness.

## 2023-11-23 NOTE — DISCHARGE NOTE OB - ADDITIONAL INSTRUCTIONS
Please check your blood pressure before taking each antihypertensive medication. If BP is above 150/100 (either number), take medication and call MD to notify. If BP is less than 110/60 (either number), do NOT take medication and notify MD for further instructions.

## 2023-11-23 NOTE — DISCHARGE NOTE OB - HOSPITAL COURSE
Severe preeclampsia delivered at 27 weeks gestational age.   Postpartum course complicated by rectus sheath hematoma, postpartum preeclampsia, and acute blood loss anemia requiring transfusion.   She was stable and met all postpartum milestones and was discharged to home on PPD#4.

## 2023-11-23 NOTE — PROGRESS NOTE ADULT - ATTENDING COMMENTS
Agree with above.   Postpartum hemorrhage, fever, and preeclampsia precautions were reviewed.   She is stable for discharge to home. She will follow up in 2 weeks in the office.   Trevor XIAO

## 2023-11-23 NOTE — DISCHARGE NOTE OB - CARE PLAN
Principal Discharge DX:	Severe preeclampsia  Assessment and plan of treatment:	Follow up in the office on Monday or Tuesday for a blood pressure check.  Secondary Diagnosis:	 delivery delivered   1

## 2023-11-23 NOTE — DISCHARGE NOTE OB - MEDICATION SUMMARY - MEDICATIONS TO TAKE
I will START or STAY ON the medications listed below when I get home from the hospital:    ibuprofen 600 mg oral tablet  -- 1 tab(s) by mouth every 6 hours  -- Indication: For Pain    acetaminophen 325 mg oral tablet  -- 3 tab(s) by mouth every 6 hours as needed for  mild pain  -- Indication: For Pain    magnesium hydroxide 8% oral suspension  -- 30 milliliter(s) by mouth 2 times a day As needed Constipation  -- Indication: For Constipation    NIFEdipine 30 mg oral tablet, extended release  -- 1 tab(s) by mouth once a day  -- Indication: For Hypertension    lanolin topical ointment  -- 1 Apply on skin to affected area every 6 hours As needed Sore Nipples  -- Indication: For Sore Nipples    simethicone 80 mg oral tablet, chewable  -- 1 tab(s) by mouth every 4 hours As needed Gas  -- Indication: For Gas

## 2023-11-23 NOTE — DISCHARGE NOTE OB - PATIENT PORTAL LINK FT
You can access the FollowMyHealth Patient Portal offered by [facility] by registering at the following website: [email or web address]. By joining [employer]’s FollowMyHealth portal, you will also be able to view your health information using other applications (apps) compatible with our system. You can access the FollowMyHealth Patient Portal offered by [facility] by registering at the following website: [email or web address]. By joining [employer]’s FollowMyHealth portal, you will also be able to view your health information using other applications (apps) compatible with our system. You can access the FollowMyHealth Patient Portal offered by [facility] by registering at the following website: [email or web address]. By joining [employer]’s FollowMyHealth portal, you will also be able to view your health information using other applications (apps) compatible with our system.

## 2023-11-23 NOTE — DISCHARGE NOTE OB - CARE PROVIDER_API CALL
Patricia Mcdonald  Obstetrics and Gynecology  [address] [place] [address], [address]  [place] [address]  Phone: [phone]  Fax: [phone]  Established Patient  Follow Up Time:    Patricia Mcdonald  Obstetrics and Gynecology  [address] [place] [address], [address]  [place] [address]  Phone: [phone]  Fax: [phone]  Established Patient  Follow Up Time:    Patricia Mcdonald  Obstetrics and Gynecology  [address] [place] [address], [address]  [place] [address]  Phone: [phone]  Fax: [phone]  Established Patient  Follow Up Time:

## 2023-11-23 NOTE — DISCHARGE NOTE OB - CARE PROVIDERS DIRECT ADDRESSES
,[email or web address]

## 2023-11-24 ENCOUNTER — NON-APPOINTMENT (OUTPATIENT)
Age: 33
End: 2023-11-24

## 2023-11-27 ENCOUNTER — APPOINTMENT (OUTPATIENT)
Dept: OBGYN | Facility: CLINIC | Age: 33
End: 2023-11-27
Payer: COMMERCIAL

## 2023-11-27 PROCEDURE — 99211 OFF/OP EST MAY X REQ PHY/QHP: CPT

## 2023-12-04 ENCOUNTER — APPOINTMENT (OUTPATIENT)
Dept: OBGYN | Facility: CLINIC | Age: 33
End: 2023-12-04

## 2023-12-04 ENCOUNTER — APPOINTMENT (OUTPATIENT)
Dept: OBGYN | Facility: CLINIC | Age: 33
End: 2023-12-04
Payer: COMMERCIAL

## 2023-12-04 VITALS
HEART RATE: 113 BPM | HEIGHT: 61 IN | SYSTOLIC BLOOD PRESSURE: 122 MMHG | BODY MASS INDEX: 25.86 KG/M2 | DIASTOLIC BLOOD PRESSURE: 86 MMHG | WEIGHT: 137 LBS | OXYGEN SATURATION: 100 %

## 2023-12-04 PROCEDURE — 0503F POSTPARTUM CARE VISIT: CPT

## 2023-12-14 LAB
SURGICAL PATHOLOGY STUDY: SIGNIFICANT CHANGE UP
SURGICAL PATHOLOGY STUDY: SIGNIFICANT CHANGE UP

## 2023-12-18 ENCOUNTER — APPOINTMENT (OUTPATIENT)
Dept: OBGYN | Facility: CLINIC | Age: 33
End: 2023-12-18

## 2023-12-22 ENCOUNTER — APPOINTMENT (OUTPATIENT)
Dept: OBGYN | Facility: CLINIC | Age: 33
End: 2023-12-22
Payer: COMMERCIAL

## 2023-12-22 VITALS
BODY MASS INDEX: 25.49 KG/M2 | SYSTOLIC BLOOD PRESSURE: 119 MMHG | DIASTOLIC BLOOD PRESSURE: 75 MMHG | HEIGHT: 61 IN | WEIGHT: 135 LBS

## 2023-12-22 PROCEDURE — 0503F POSTPARTUM CARE VISIT: CPT

## 2023-12-22 NOTE — HISTORY OF PRESENT ILLNESS
[Delivery Date: ___] : on [unfilled] [Male] : Delivery History: baby boy [Breastfeeding] : currently nursing [Postpartum Follow Up] : postpartum follow up [None] : No associated symptoms are reported [Examination Of The Breasts] : breasts are normal [Doing Well] : is doing well [No Sign of Infection] : is showing no signs of infection [Excellent Pain Control] : has excellent pain control [FreeTextEntry8] : 32 y/o s/p c/s 27+3/7 due to severe preeclampsia with worsening renal function, post op course complicated by rectus sheath hematoma and severe anemia - requiring blood transfusion. Pt reports low blood pressure and skipped her dose of Procardia today. Pt reports baby is doing well and had a PDA correction. She denies pain and is currently pumping.  [de-identified] : Dr. ROSS [de-identified] : pumping [de-identified] : condoms for contraception. refer to cardio OB program. Pt may return to normal activities. DC Procardia but continue bp monitoring for one week. RTO 4 months for annual exam.

## 2023-12-22 NOTE — END OF VISIT
[FreeTextEntry3] : I, Cassandra Matthews, acted as a scribe on behalf of Dr. Patricia Mcdonald on 12/22/2023 .  All medical entries made by the scribe were at my, Dr. Patricia Mcdonald, direction and personally dictated by me on 12/22/2023. I have reviewed the chart and agree that the record accurately reflects my personal performance of the history, physical exam, assessment and plan. I have also personally directed, reviewed, and agreed with the chart.

## 2023-12-31 PROBLEM — Z11.3 ENCOUNTER FOR SCREENING EXAMINATION FOR SEXUALLY TRANSMITTED DISEASE: Status: RESOLVED | Noted: 2023-06-26 | Resolved: 2023-07-10

## 2024-01-03 ENCOUNTER — APPOINTMENT (OUTPATIENT)
Dept: OBGYN | Facility: CLINIC | Age: 34
End: 2024-01-03

## 2024-01-22 ENCOUNTER — APPOINTMENT (OUTPATIENT)
Dept: CARDIOLOGY | Facility: CLINIC | Age: 34
End: 2024-01-22
Payer: COMMERCIAL

## 2024-01-22 DIAGNOSIS — Z82.3 FAMILY HISTORY OF STROKE: ICD-10-CM

## 2024-01-22 DIAGNOSIS — Z82.49 FAMILY HISTORY OF ISCHEMIC HEART DISEASE AND OTHER DISEASES OF THE CIRCULATORY SYSTEM: ICD-10-CM

## 2024-01-22 DIAGNOSIS — O14.10 SEVERE PRE-ECLAMPSIA, UNSPECIFIED TRIMESTER: ICD-10-CM

## 2024-01-22 DIAGNOSIS — Z87.59 PERSONAL HISTORY OF OTHER COMPLICATIONS OF PREGNANCY, CHILDBIRTH AND THE PUERPERIUM: ICD-10-CM

## 2024-01-22 PROCEDURE — 99203 OFFICE O/P NEW LOW 30 MIN: CPT | Mod: 95

## 2024-01-26 PROBLEM — O14.10 PREECLAMPSIA, SEVERE: Status: ACTIVE | Noted: 2023-11-23

## 2024-01-26 PROBLEM — Z82.49 FAMILY HISTORY OF MYOCARDIAL INFARCTION: Status: ACTIVE | Noted: 2024-01-26

## 2024-01-26 PROBLEM — Z82.3 FAMILY HISTORY OF STROKE: Status: ACTIVE | Noted: 2024-01-26

## 2024-01-26 PROBLEM — Z82.49 FAMILY HISTORY OF CARDIAC PACEMAKER: Status: ACTIVE | Noted: 2024-01-26

## 2024-01-26 PROBLEM — Z87.59 HISTORY OF SEVERE PRE-ECLAMPSIA: Status: RESOLVED | Noted: 2024-01-26 | Resolved: 2024-01-26

## 2024-01-26 NOTE — ASSESSMENT
[FreeTextEntry1] : Ms. Guerrero is a 33 year old female that presents [facility] for blood pressure management and pre term labor postpartum.  Patient delivered a baby at 27.3 weeks gestation due to severe preeclampsia, hypertension and worsening renal function. Post op course was complicated by a rectus sheath hematoma and severe anemia requiring blood transfusion.   #Gestational hypertension   Blood pressures are now in excellent control -> off medication  #Adverse Cardiovascular Risk Factors  Personal history of severe PEC, gHTN Family history of stroke, CAD, MI, PPM implant, pulmonary disease  Recommending a baseline cardiovascular evaluation 3 months postpartum to assess risk In-office physical examination and 12 lead EKG Echocardiogram to assess cardiac structure and function Exercise stress testing to assess functional status Full blood work panel:  CBC,CMP, Hgb A1C, TSH, Lipid profile  - Encouraged patient to participate in  healthy exercise (when cleared by OB) and eating habits, focusing on a Mediterranean style of eating and aiming for the recommended 150 minutes per week of moderate physical activity.

## 2024-01-26 NOTE — REASON FOR VISIT
[Home] : at home, [unfilled] , at the time of the visit. [Other Location: e.g. Home (Enter Location, City,State)___] : at [unfilled] [Patient] : the patient [FreeTextEntry2] : Sobia Guerrero

## 2024-01-26 NOTE — HISTORY OF PRESENT ILLNESS
[FreeTextEntry1] : Ms. Guerrero is a 33 year old female that presents [facility] for blood pressure management and pre term labor postpartum.  Patient delivered a baby at 27.3 weeks gestation due to severe preeclampsia, hypertension and worsening renal function. Post op course was complicated by a rectus sheath hematoma and severe anemia requiring blood transfusion.   Patient was treated with IV Magnesium for seizure prophylaxis and given Nifedipine ER 30 mg for blood pressure control.  She delivered the baby on November 19, 2023. The original due date was February 15, 2024.  The baby remains in the NICU to-date, with reported -> slow but steady progress.  Patient acknowledges that she discontinued her blood pressure medication 4 weeks ago.  Todays BP is:   117/ 70  +Family history of stroke, CAD, MI, PPM implant, pulmonary disease  Ms. Guerrero reports that she currently feels well and denies and issues with shortness of breath, chest disomfort or palpitaitons.

## 2024-04-18 ENCOUNTER — NON-APPOINTMENT (OUTPATIENT)
Age: 34
End: 2024-04-18

## 2024-08-08 ENCOUNTER — NON-APPOINTMENT (OUTPATIENT)
Age: 34
End: 2024-08-08

## 2025-06-18 NOTE — OB RN PATIENT PROFILE - PARENTS VERBALIZED UNDERSTANDING OF THE SAFE SKIN TO SKIN POSITIONING OF THE NEWBORN.
Discharge education provided. Pt verbalized understanding. No questions or concerns at this time. AVS provided. IV removed. Pt discharged.    Statement Selected